# Patient Record
Sex: MALE | Race: WHITE | Employment: FULL TIME | URBAN - METROPOLITAN AREA
[De-identification: names, ages, dates, MRNs, and addresses within clinical notes are randomized per-mention and may not be internally consistent; named-entity substitution may affect disease eponyms.]

---

## 2017-01-05 ENCOUNTER — HOSPITAL ENCOUNTER (OUTPATIENT)
Dept: GENERAL RADIOLOGY | Age: 62
Discharge: HOME | End: 2017-01-05
Payer: COMMERCIAL

## 2017-01-05 DIAGNOSIS — M17.11 PRIMARY OSTEOARTHRITIS OF RIGHT KNEE: ICD-10-CM

## 2017-01-05 PROCEDURE — 73565 X-RAY EXAM OF KNEES: CPT

## 2017-02-02 PROBLEM — M17.11 PRIMARY OSTEOARTHRITIS OF RIGHT KNEE: Status: ACTIVE | Noted: 2017-02-02

## 2017-03-24 RX ORDER — MONTELUKAST SODIUM 10 MG/1
10 TABLET ORAL NIGHTLY
Qty: 90 TABLET | Refills: 3 | Status: SHIPPED | OUTPATIENT
Start: 2017-03-24 | End: 2017-04-18 | Stop reason: SDUPTHER

## 2017-04-18 ENCOUNTER — OFFICE VISIT (OUTPATIENT)
Dept: FAMILY MEDICINE CLINIC | Age: 62
End: 2017-04-18

## 2017-04-18 VITALS
HEIGHT: 72 IN | RESPIRATION RATE: 18 BRPM | SYSTOLIC BLOOD PRESSURE: 134 MMHG | DIASTOLIC BLOOD PRESSURE: 84 MMHG | HEART RATE: 65 BPM | WEIGHT: 263 LBS | BODY MASS INDEX: 35.62 KG/M2 | OXYGEN SATURATION: 96 % | TEMPERATURE: 97.8 F

## 2017-04-18 DIAGNOSIS — Z12.5 PROSTATE CANCER SCREENING: ICD-10-CM

## 2017-04-18 DIAGNOSIS — I10 ESSENTIAL HYPERTENSION: Primary | ICD-10-CM

## 2017-04-18 DIAGNOSIS — I10 ESSENTIAL HYPERTENSION: ICD-10-CM

## 2017-04-18 DIAGNOSIS — M17.11 PRIMARY OSTEOARTHRITIS OF RIGHT KNEE: ICD-10-CM

## 2017-04-18 LAB
ALBUMIN SERPL-MCNC: 4.6 G/DL (ref 3.9–4.9)
ALP BLD-CCNC: 57 U/L (ref 35–104)
ALT SERPL-CCNC: 27 U/L (ref 0–41)
ANION GAP SERPL CALCULATED.3IONS-SCNC: 14 MEQ/L (ref 7–13)
AST SERPL-CCNC: 26 U/L (ref 0–40)
BILIRUB SERPL-MCNC: 0.9 MG/DL (ref 0–1.2)
BUN BLDV-MCNC: 21 MG/DL (ref 8–23)
CALCIUM SERPL-MCNC: 10 MG/DL (ref 8.6–10.2)
CHLORIDE BLD-SCNC: 103 MEQ/L (ref 98–107)
CHOLESTEROL, TOTAL: 187 MG/DL (ref 0–199)
CO2: 24 MEQ/L (ref 22–29)
CREAT SERPL-MCNC: 0.93 MG/DL (ref 0.7–1.2)
GFR AFRICAN AMERICAN: >60
GFR NON-AFRICAN AMERICAN: >60
GLOBULIN: 2.4 G/DL (ref 2.3–3.5)
GLUCOSE BLD-MCNC: 115 MG/DL (ref 74–109)
HDLC SERPL-MCNC: 62 MG/DL (ref 40–59)
LDL CHOLESTEROL CALCULATED: 89 MG/DL (ref 0–129)
POTASSIUM SERPL-SCNC: 4 MEQ/L (ref 3.5–5.1)
PROSTATE SPECIFIC ANTIGEN: 0.65 NG/ML (ref 0–5.4)
SODIUM BLD-SCNC: 141 MEQ/L (ref 132–144)
TOTAL PROTEIN: 7 G/DL (ref 6.4–8.1)
TRIGL SERPL-MCNC: 181 MG/DL (ref 0–200)

## 2017-04-18 PROCEDURE — 99214 OFFICE O/P EST MOD 30 MIN: CPT | Performed by: FAMILY MEDICINE

## 2017-04-18 RX ORDER — AMLODIPINE BESYLATE 5 MG/1
5 TABLET ORAL DAILY
Qty: 90 TABLET | Refills: 3 | Status: SHIPPED | OUTPATIENT
Start: 2017-04-18 | End: 2017-10-17 | Stop reason: SDUPTHER

## 2017-04-18 RX ORDER — ATORVASTATIN CALCIUM 20 MG/1
20 TABLET, FILM COATED ORAL DAILY
Qty: 90 TABLET | Refills: 3 | Status: SHIPPED | OUTPATIENT
Start: 2017-04-18 | End: 2018-04-02 | Stop reason: SDUPTHER

## 2017-04-18 RX ORDER — METOPROLOL TARTRATE 100 MG/1
100 TABLET ORAL 2 TIMES DAILY
Qty: 180 TABLET | Refills: 3 | Status: SHIPPED | OUTPATIENT
Start: 2017-04-18 | End: 2018-04-02 | Stop reason: SDUPTHER

## 2017-04-18 RX ORDER — MONTELUKAST SODIUM 10 MG/1
10 TABLET ORAL NIGHTLY
Qty: 90 TABLET | Refills: 3 | Status: SHIPPED | OUTPATIENT
Start: 2017-04-18 | End: 2018-03-23 | Stop reason: SDUPTHER

## 2017-04-18 RX ORDER — MELOXICAM 15 MG/1
15 TABLET ORAL DAILY
Qty: 90 TABLET | Refills: 3 | Status: SHIPPED | OUTPATIENT
Start: 2017-04-18 | End: 2018-03-04 | Stop reason: SDUPTHER

## 2017-04-18 ASSESSMENT — ENCOUNTER SYMPTOMS
ALLERGIC/IMMUNOLOGIC NEGATIVE: 1
SHORTNESS OF BREATH: 0
EYES NEGATIVE: 1
RESPIRATORY NEGATIVE: 1
GASTROINTESTINAL NEGATIVE: 1

## 2017-05-18 ENCOUNTER — TELEPHONE (OUTPATIENT)
Dept: FAMILY MEDICINE CLINIC | Age: 62
End: 2017-05-18

## 2017-10-17 ENCOUNTER — OFFICE VISIT (OUTPATIENT)
Dept: FAMILY MEDICINE CLINIC | Age: 62
End: 2017-10-17

## 2017-10-17 VITALS
HEART RATE: 74 BPM | HEIGHT: 73 IN | OXYGEN SATURATION: 98 % | SYSTOLIC BLOOD PRESSURE: 160 MMHG | DIASTOLIC BLOOD PRESSURE: 108 MMHG | BODY MASS INDEX: 36.45 KG/M2 | RESPIRATION RATE: 18 BRPM | TEMPERATURE: 98.1 F | WEIGHT: 275 LBS

## 2017-10-17 DIAGNOSIS — I10 ESSENTIAL HYPERTENSION: Primary | ICD-10-CM

## 2017-10-17 DIAGNOSIS — M17.11 PRIMARY OSTEOARTHRITIS OF RIGHT KNEE: ICD-10-CM

## 2017-10-17 PROCEDURE — 99213 OFFICE O/P EST LOW 20 MIN: CPT | Performed by: FAMILY MEDICINE

## 2017-10-17 RX ORDER — AMLODIPINE BESYLATE 10 MG/1
10 TABLET ORAL DAILY
Qty: 90 TABLET | Refills: 3 | Status: SHIPPED | OUTPATIENT
Start: 2017-10-17 | End: 2018-10-02 | Stop reason: SDUPTHER

## 2017-10-17 RX ORDER — FLUTICASONE PROPIONATE 50 MCG
2 SPRAY, SUSPENSION (ML) NASAL DAILY
Qty: 3 BOTTLE | Refills: 3 | Status: SHIPPED | OUTPATIENT
Start: 2017-10-17 | End: 2018-10-02 | Stop reason: SDUPTHER

## 2017-10-17 NOTE — PROGRESS NOTES
Patient is seen in follow up for   Chief Complaint   Patient presents with    Hypertension     6 mon f/u      Hypertension   This is a chronic problem. The current episode started more than 1 year ago. The problem is unchanged. The problem is uncontrolled. Pertinent negatives include no chest pain, palpitations or shortness of breath. There are no associated agents to hypertension. There are no known risk factors for coronary artery disease. Past treatments include calcium channel blockers and beta blockers. The current treatment provides moderate improvement. There are no compliance problems. Past Medical History:   Diagnosis Date    Hypertension      Patient Active Problem List    Diagnosis Date Noted    Primary osteoarthritis of right knee 02/02/2017    Hypertension      No past surgical history on file.   Family History   Problem Relation Age of Onset    High Blood Pressure Mother     Diabetes Mother     Cancer Mother      ovarian    High Blood Pressure Father     Diabetes Father     Cancer Father      lung     Social History     Social History    Marital status: Single     Spouse name: N/A    Number of children: N/A    Years of education: N/A     Social History Main Topics    Smoking status: Never Smoker    Smokeless tobacco: Never Used    Alcohol use None    Drug use: Unknown    Sexual activity: Not Asked     Other Topics Concern    None     Social History Narrative    None     Current Outpatient Prescriptions   Medication Sig Dispense Refill    amLODIPine (NORVASC) 10 MG tablet Take 1 tablet by mouth daily 90 tablet 3    fluticasone (FLONASE) 50 MCG/ACT nasal spray 2 sprays by Nasal route daily 3 Bottle 3    montelukast (SINGULAIR) 10 MG tablet Take 1 tablet by mouth nightly 90 tablet 3    meloxicam (MOBIC) 15 MG tablet Take 1 tablet by mouth daily 90 tablet 3    atorvastatin (LIPITOR) 20 MG tablet Take 1 tablet by mouth daily 90 tablet 3    metoprolol (LOPRESSOR) 100 MG tablet Take 1 tablet by mouth 2 times daily 180 tablet 3    PAZEO 0.7 % SOLN 1 DROP INTO BOTH EYES EVERYDAY  3     No current facility-administered medications for this visit. Current Outpatient Prescriptions on File Prior to Visit   Medication Sig Dispense Refill    montelukast (SINGULAIR) 10 MG tablet Take 1 tablet by mouth nightly 90 tablet 3    meloxicam (MOBIC) 15 MG tablet Take 1 tablet by mouth daily 90 tablet 3    atorvastatin (LIPITOR) 20 MG tablet Take 1 tablet by mouth daily 90 tablet 3    metoprolol (LOPRESSOR) 100 MG tablet Take 1 tablet by mouth 2 times daily 180 tablet 3    PAZEO 0.7 % SOLN 1 DROP INTO BOTH EYES EVERYDAY  3     No current facility-administered medications on file prior to visit. No Known Allergies  Health Maintenance   Topic Date Due    Hepatitis C screen  1955    HIV screen  12/24/1970    DTaP/Tdap/Td vaccine (1 - Tdap) 12/24/1974    Diabetes screen  12/24/1995    Colon cancer screen colonoscopy  12/24/2005    Zostavax vaccine  12/24/2015    Flu vaccine (1) 09/01/2017    Lipid screen  04/18/2022       Review of Systems    Review of Systems   Constitutional: Negative for activity change, appetite change, chills, fever and unexpected weight change. HENT: Negative. Eyes: Negative. Respiratory: Negative. Negative for shortness of breath. Cardiovascular: Negative. Negative for chest pain and palpitations. Gastrointestinal: Negative. Endocrine: Negative. Genitourinary: Negative. Musculoskeletal: Negative. Skin: Negative. Allergic/Immunologic: Negative. Neurological: Negative. Hematological: Negative. Psychiatric/Behavioral: Negative. Physical Exam  Vitals:    10/17/17 1547   BP: (!) 160/108   Pulse: 74   Resp: 18   Temp: 98.1 °F (36.7 °C)   TempSrc: Tympanic   SpO2: 98%   Weight: 275 lb (124.7 kg)   Height: 6' 1\" (1.854 m)       Physical Exam   Constitutional: He appears well-developed and well-nourished.    HENT:   Right Ear: External ear normal.   Left Ear: External ear normal.   Eyes: Conjunctivae are normal. Pupils are equal, round, and reactive to light. Neck: Normal range of motion. Neck supple. No thyromegaly present. Cardiovascular: Normal rate, regular rhythm and normal heart sounds. No murmur heard. Pulmonary/Chest: Effort normal and breath sounds normal.   Abdominal: Soft. Bowel sounds are normal.   Musculoskeletal: Normal range of motion. He exhibits no edema or tenderness. Lymphadenopathy:     He has no cervical adenopathy. Assessment  1. Essential hypertension     2. Primary osteoarthritis of right knee       Problem List     Hypertension - Primary    Relevant Medications    atorvastatin (LIPITOR) 20 MG tablet    metoprolol (LOPRESSOR) 100 MG tablet    amLODIPine (NORVASC) 10 MG tablet    Primary osteoarthritis of right knee    Relevant Medications    betamethasone acetate-betamethasone sodium phosphate (CELESTONE) injection 6 mg (Completed)    betamethasone acetate-betamethasone sodium phosphate (CELESTONE) injection 6 mg (Completed)    meloxicam (MOBIC) 15 MG tablet    sodium hyaluronate (SUPARTZ) injection 25 mg (Completed)    sodium hyaluronate (SUPARTZ) injection 25 mg (Completed)    sodium hyaluronate (SUPARTZ) injection 25 mg (Completed)    sodium hyaluronate (SUPARTZ) injection 25 mg (Completed)    sodium hyaluronate (SUPARTZ) injection 25 mg (Completed)          Plan  No orders of the defined types were placed in this encounter. Orders Placed This Encounter   Medications    amLODIPine (NORVASC) 10 MG tablet     Sig: Take 1 tablet by mouth daily     Dispense:  90 tablet     Refill:  3     Please do not fill until patient requests    fluticasone (FLONASE) 50 MCG/ACT nasal spray     Si sprays by Nasal route daily     Dispense:  3 Bottle     Refill:  3     No Follow-up on file.   Efrain Appiah MD

## 2017-11-02 ASSESSMENT — ENCOUNTER SYMPTOMS
SHORTNESS OF BREATH: 0
EYES NEGATIVE: 1
RESPIRATORY NEGATIVE: 1
ALLERGIC/IMMUNOLOGIC NEGATIVE: 1
GASTROINTESTINAL NEGATIVE: 1

## 2018-02-07 ENCOUNTER — HOSPITAL ENCOUNTER (OUTPATIENT)
Dept: ULTRASOUND IMAGING | Age: 63
Discharge: HOME | End: 2018-02-09
Payer: COMMERCIAL

## 2018-02-07 ENCOUNTER — HOSPITAL ENCOUNTER (OUTPATIENT)
Dept: ORTHOPEDIC SURGERY | Age: 63
Discharge: HOME | End: 2018-02-09
Payer: COMMERCIAL

## 2018-02-07 DIAGNOSIS — M25.571 PAIN IN JOINT INVOLVING RIGHT ANKLE AND FOOT: ICD-10-CM

## 2018-02-07 DIAGNOSIS — M25.571 PAIN AND SWELLING OF ANKLE, RIGHT: ICD-10-CM

## 2018-02-07 DIAGNOSIS — M25.471 PAIN AND SWELLING OF ANKLE, RIGHT: ICD-10-CM

## 2018-02-07 PROCEDURE — 73610 X-RAY EXAM OF ANKLE: CPT

## 2018-02-07 PROCEDURE — 93971 EXTREMITY STUDY: CPT

## 2018-02-21 ENCOUNTER — HOSPITAL ENCOUNTER (OUTPATIENT)
Dept: ORTHOPEDIC SURGERY | Age: 63
Discharge: HOME | End: 2018-02-23
Payer: COMMERCIAL

## 2018-02-21 DIAGNOSIS — S82.891D CLOSED FRACTURE OF RIGHT ANKLE WITH ROUTINE HEALING, SUBSEQUENT ENCOUNTER: ICD-10-CM

## 2018-02-21 PROCEDURE — 73610 X-RAY EXAM OF ANKLE: CPT

## 2018-03-05 RX ORDER — MELOXICAM 15 MG/1
TABLET ORAL
Qty: 90 TABLET | Refills: 3 | Status: SHIPPED | OUTPATIENT
Start: 2018-03-05 | End: 2019-05-07 | Stop reason: SDUPTHER

## 2018-03-05 RX ORDER — MONTELUKAST SODIUM 10 MG/1
TABLET ORAL
Qty: 90 TABLET | Refills: 3 | Status: SHIPPED | OUTPATIENT
Start: 2018-03-05 | End: 2018-03-23 | Stop reason: SDUPTHER

## 2018-03-14 ENCOUNTER — HOSPITAL ENCOUNTER (OUTPATIENT)
Dept: GENERAL RADIOLOGY | Age: 63
Discharge: HOME | End: 2018-03-16
Payer: COMMERCIAL

## 2018-03-14 DIAGNOSIS — T14.8XXA CLOSED FRACTURE: ICD-10-CM

## 2018-03-14 PROCEDURE — 73610 X-RAY EXAM OF ANKLE: CPT

## 2018-03-23 RX ORDER — MONTELUKAST SODIUM 10 MG/1
TABLET ORAL
Qty: 90 TABLET | Refills: 3 | Status: SHIPPED | OUTPATIENT
Start: 2018-03-23 | End: 2020-01-28

## 2018-04-02 ENCOUNTER — OFFICE VISIT (OUTPATIENT)
Dept: INTERNAL MEDICINE CLINIC | Age: 63
End: 2018-04-02
Payer: COMMERCIAL

## 2018-04-02 VITALS
SYSTOLIC BLOOD PRESSURE: 138 MMHG | TEMPERATURE: 98 F | HEART RATE: 71 BPM | RESPIRATION RATE: 16 BRPM | WEIGHT: 262 LBS | DIASTOLIC BLOOD PRESSURE: 80 MMHG | HEIGHT: 74 IN | OXYGEN SATURATION: 98 % | BODY MASS INDEX: 33.62 KG/M2

## 2018-04-02 DIAGNOSIS — Z12.5 PROSTATE CANCER SCREENING: ICD-10-CM

## 2018-04-02 DIAGNOSIS — Z00.00 PHYSICAL EXAM, ANNUAL: Primary | ICD-10-CM

## 2018-04-02 DIAGNOSIS — I10 ESSENTIAL HYPERTENSION: ICD-10-CM

## 2018-04-02 LAB
BASOPHILS ABSOLUTE: 0 K/UL (ref 0–0.2)
BASOPHILS RELATIVE PERCENT: 1 %
EOSINOPHILS ABSOLUTE: 0.1 K/UL (ref 0–0.7)
EOSINOPHILS RELATIVE PERCENT: 2.5 %
HCT VFR BLD CALC: 40.7 % (ref 42–52)
HEMOGLOBIN: 13.7 G/DL (ref 14–18)
LYMPHOCYTES ABSOLUTE: 1.4 K/UL (ref 1–4.8)
LYMPHOCYTES RELATIVE PERCENT: 30.5 %
MCH RBC QN AUTO: 31.1 PG (ref 27–31.3)
MCHC RBC AUTO-ENTMCNC: 33.8 % (ref 33–37)
MCV RBC AUTO: 92.1 FL (ref 80–100)
MONOCYTES ABSOLUTE: 0.5 K/UL (ref 0.2–0.8)
MONOCYTES RELATIVE PERCENT: 9.7 %
NEUTROPHILS ABSOLUTE: 2.6 K/UL (ref 1.4–6.5)
NEUTROPHILS RELATIVE PERCENT: 56.3 %
PDW BLD-RTO: 13.2 % (ref 11.5–14.5)
PLATELET # BLD: 207 K/UL (ref 130–400)
RBC # BLD: 4.42 M/UL (ref 4.7–6.1)
WBC # BLD: 4.7 K/UL (ref 4.8–10.8)

## 2018-04-02 PROCEDURE — 99396 PREV VISIT EST AGE 40-64: CPT | Performed by: FAMILY MEDICINE

## 2018-04-02 PROCEDURE — 36415 COLL VENOUS BLD VENIPUNCTURE: CPT | Performed by: FAMILY MEDICINE

## 2018-04-02 RX ORDER — ATORVASTATIN CALCIUM 20 MG/1
20 TABLET, FILM COATED ORAL DAILY
Qty: 90 TABLET | Refills: 3 | Status: SHIPPED | OUTPATIENT
Start: 2018-04-02 | End: 2019-04-02 | Stop reason: SDUPTHER

## 2018-04-02 RX ORDER — METOPROLOL TARTRATE 100 MG/1
100 TABLET ORAL 2 TIMES DAILY
Qty: 180 TABLET | Refills: 3 | Status: SHIPPED | OUTPATIENT
Start: 2018-04-02 | End: 2019-04-02 | Stop reason: SDUPTHER

## 2018-04-02 ASSESSMENT — PATIENT HEALTH QUESTIONNAIRE - PHQ9
2. FEELING DOWN, DEPRESSED OR HOPELESS: 0
SUM OF ALL RESPONSES TO PHQ9 QUESTIONS 1 & 2: 0
SUM OF ALL RESPONSES TO PHQ QUESTIONS 1-9: 0
1. LITTLE INTEREST OR PLEASURE IN DOING THINGS: 0

## 2018-04-02 ASSESSMENT — ENCOUNTER SYMPTOMS
EYES NEGATIVE: 1
SHORTNESS OF BREATH: 0
RESPIRATORY NEGATIVE: 1
GASTROINTESTINAL NEGATIVE: 1
ALLERGIC/IMMUNOLOGIC NEGATIVE: 1

## 2018-04-03 LAB
ALBUMIN SERPL-MCNC: 4.7 G/DL (ref 3.9–4.9)
ALP BLD-CCNC: 82 U/L (ref 35–104)
ALT SERPL-CCNC: 25 U/L (ref 0–41)
ANION GAP SERPL CALCULATED.3IONS-SCNC: 22 MEQ/L (ref 7–13)
AST SERPL-CCNC: 29 U/L (ref 0–40)
BILIRUB SERPL-MCNC: 1 MG/DL (ref 0–1.2)
BUN BLDV-MCNC: 22 MG/DL (ref 8–23)
CALCIUM SERPL-MCNC: 9.2 MG/DL (ref 8.6–10.2)
CHLORIDE BLD-SCNC: 101 MEQ/L (ref 98–107)
CHOLESTEROL, TOTAL: 174 MG/DL (ref 0–199)
CO2: 20 MEQ/L (ref 22–29)
CREAT SERPL-MCNC: 0.96 MG/DL (ref 0.7–1.2)
GFR AFRICAN AMERICAN: >60
GFR NON-AFRICAN AMERICAN: >60
GLOBULIN: 2.5 G/DL (ref 2.3–3.5)
GLUCOSE BLD-MCNC: 103 MG/DL (ref 74–109)
HDLC SERPL-MCNC: 56 MG/DL (ref 40–59)
LDL CHOLESTEROL CALCULATED: 68 MG/DL (ref 0–129)
POTASSIUM SERPL-SCNC: 4.4 MEQ/L (ref 3.5–5.1)
PROSTATE SPECIFIC ANTIGEN: 0.58 NG/ML (ref 0–5.4)
SODIUM BLD-SCNC: 143 MEQ/L (ref 132–144)
TOTAL PROTEIN: 7.2 G/DL (ref 6.4–8.1)
TRIGL SERPL-MCNC: 251 MG/DL (ref 0–200)

## 2018-04-11 ENCOUNTER — HOSPITAL ENCOUNTER (OUTPATIENT)
Dept: GENERAL RADIOLOGY | Age: 63
Discharge: HOME | End: 2018-04-13
Payer: COMMERCIAL

## 2018-04-11 DIAGNOSIS — S82.891A CLOSED FRACTURE OF RIGHT ANKLE, INITIAL ENCOUNTER: ICD-10-CM

## 2018-04-11 PROCEDURE — 73610 X-RAY EXAM OF ANKLE: CPT

## 2018-10-02 ENCOUNTER — OFFICE VISIT (OUTPATIENT)
Dept: INTERNAL MEDICINE CLINIC | Age: 63
End: 2018-10-02
Payer: COMMERCIAL

## 2018-10-02 VITALS
OXYGEN SATURATION: 97 % | DIASTOLIC BLOOD PRESSURE: 84 MMHG | WEIGHT: 273 LBS | TEMPERATURE: 97.7 F | HEIGHT: 74 IN | SYSTOLIC BLOOD PRESSURE: 136 MMHG | HEART RATE: 92 BPM | BODY MASS INDEX: 35.04 KG/M2 | RESPIRATION RATE: 14 BRPM

## 2018-10-02 DIAGNOSIS — Z12.11 SCREENING FOR COLON CANCER: ICD-10-CM

## 2018-10-02 DIAGNOSIS — I10 ESSENTIAL HYPERTENSION: Primary | ICD-10-CM

## 2018-10-02 LAB — GLUCOSE FASTING: 103 MG/DL

## 2018-10-02 PROCEDURE — 99214 OFFICE O/P EST MOD 30 MIN: CPT | Performed by: FAMILY MEDICINE

## 2018-10-02 RX ORDER — FLUTICASONE PROPIONATE 50 MCG
2 SPRAY, SUSPENSION (ML) NASAL DAILY
Qty: 3 BOTTLE | Refills: 3 | Status: SHIPPED | OUTPATIENT
Start: 2018-10-02 | End: 2019-09-17 | Stop reason: ALTCHOICE

## 2018-10-02 RX ORDER — AMLODIPINE BESYLATE 10 MG/1
10 TABLET ORAL DAILY
Qty: 90 TABLET | Refills: 3 | Status: SHIPPED | OUTPATIENT
Start: 2018-10-02 | End: 2018-10-22 | Stop reason: SDUPTHER

## 2018-10-02 ASSESSMENT — ENCOUNTER SYMPTOMS
ALLERGIC/IMMUNOLOGIC NEGATIVE: 1
EYES NEGATIVE: 1
RESPIRATORY NEGATIVE: 1
SHORTNESS OF BREATH: 0
GASTROINTESTINAL NEGATIVE: 1

## 2018-10-04 ENCOUNTER — NURSE ONLY (OUTPATIENT)
Dept: INTERNAL MEDICINE CLINIC | Age: 63
End: 2018-10-04
Payer: COMMERCIAL

## 2018-10-04 DIAGNOSIS — Z12.11 SCREENING FOR COLON CANCER: ICD-10-CM

## 2018-10-04 LAB
CONTROL: NORMAL
HEMOCCULT STL QL: NEGATIVE

## 2018-10-04 PROCEDURE — 82274 ASSAY TEST FOR BLOOD FECAL: CPT | Performed by: FAMILY MEDICINE

## 2018-10-22 RX ORDER — AMLODIPINE BESYLATE 10 MG/1
10 TABLET ORAL DAILY
Qty: 90 TABLET | Refills: 1 | Status: SHIPPED | OUTPATIENT
Start: 2018-10-22 | End: 2019-09-17 | Stop reason: ALTCHOICE

## 2018-12-03 RX ORDER — MONTELUKAST SODIUM 10 MG/1
TABLET ORAL
Qty: 90 TABLET | Refills: 3 | Status: SHIPPED | OUTPATIENT
Start: 2018-12-03 | End: 2019-09-17 | Stop reason: ALTCHOICE

## 2018-12-03 RX ORDER — AMLODIPINE BESYLATE 10 MG/1
10 TABLET ORAL DAILY
Qty: 90 TABLET | Refills: 3 | Status: SHIPPED | OUTPATIENT
Start: 2018-12-03 | End: 2020-06-17

## 2019-04-02 ENCOUNTER — OFFICE VISIT (OUTPATIENT)
Dept: INTERNAL MEDICINE CLINIC | Age: 64
End: 2019-04-02
Payer: COMMERCIAL

## 2019-04-02 VITALS
WEIGHT: 274 LBS | OXYGEN SATURATION: 98 % | HEIGHT: 72 IN | RESPIRATION RATE: 14 BRPM | SYSTOLIC BLOOD PRESSURE: 130 MMHG | HEART RATE: 83 BPM | TEMPERATURE: 97.6 F | BODY MASS INDEX: 37.11 KG/M2 | DIASTOLIC BLOOD PRESSURE: 82 MMHG

## 2019-04-02 DIAGNOSIS — Z00.00 PHYSICAL EXAM, ANNUAL: Primary | ICD-10-CM

## 2019-04-02 DIAGNOSIS — I10 ESSENTIAL HYPERTENSION: ICD-10-CM

## 2019-04-02 DIAGNOSIS — Z12.5 PROSTATE CANCER SCREENING: ICD-10-CM

## 2019-04-02 LAB
ALBUMIN SERPL-MCNC: 4.6 G/DL (ref 3.5–4.6)
ALP BLD-CCNC: 64 U/L (ref 35–104)
ALT SERPL-CCNC: 20 U/L (ref 0–41)
ANION GAP SERPL CALCULATED.3IONS-SCNC: 17 MEQ/L (ref 9–15)
AST SERPL-CCNC: 20 U/L (ref 0–40)
BASOPHILS ABSOLUTE: 0.1 K/UL (ref 0–0.2)
BASOPHILS RELATIVE PERCENT: 1.1 %
BILIRUB SERPL-MCNC: 1 MG/DL (ref 0.2–0.7)
BUN BLDV-MCNC: 22 MG/DL (ref 8–23)
CALCIUM SERPL-MCNC: 9.6 MG/DL (ref 8.5–9.9)
CHLORIDE BLD-SCNC: 105 MEQ/L (ref 95–107)
CHOLESTEROL, TOTAL: 186 MG/DL (ref 0–199)
CO2: 21 MEQ/L (ref 20–31)
CREAT SERPL-MCNC: 0.84 MG/DL (ref 0.7–1.2)
EOSINOPHILS ABSOLUTE: 0.2 K/UL (ref 0–0.7)
EOSINOPHILS RELATIVE PERCENT: 3.2 %
GFR AFRICAN AMERICAN: >60
GFR NON-AFRICAN AMERICAN: >60
GLOBULIN: 2.8 G/DL (ref 2.3–3.5)
GLUCOSE BLD-MCNC: 113 MG/DL (ref 70–99)
HCT VFR BLD CALC: 41.5 % (ref 42–52)
HDLC SERPL-MCNC: 69 MG/DL (ref 40–59)
HEMOGLOBIN: 13.7 G/DL (ref 14–18)
LDL CHOLESTEROL CALCULATED: 70 MG/DL (ref 0–129)
LYMPHOCYTES ABSOLUTE: 1.4 K/UL (ref 1–4.8)
LYMPHOCYTES RELATIVE PERCENT: 28.4 %
MCH RBC QN AUTO: 29.9 PG (ref 27–31.3)
MCHC RBC AUTO-ENTMCNC: 33.2 % (ref 33–37)
MCV RBC AUTO: 90.1 FL (ref 80–100)
MONOCYTES ABSOLUTE: 0.5 K/UL (ref 0.2–0.8)
MONOCYTES RELATIVE PERCENT: 9.7 %
NEUTROPHILS ABSOLUTE: 2.8 K/UL (ref 1.4–6.5)
NEUTROPHILS RELATIVE PERCENT: 57.6 %
PDW BLD-RTO: 13.5 % (ref 11.5–14.5)
PLATELET # BLD: 178 K/UL (ref 130–400)
POTASSIUM SERPL-SCNC: 4.1 MEQ/L (ref 3.4–4.9)
PROSTATE SPECIFIC ANTIGEN: 0.66 NG/ML (ref 0–5.4)
RBC # BLD: 4.6 M/UL (ref 4.7–6.1)
SODIUM BLD-SCNC: 143 MEQ/L (ref 135–144)
TOTAL PROTEIN: 7.4 G/DL (ref 6.3–8)
TRIGL SERPL-MCNC: 233 MG/DL (ref 0–150)
WBC # BLD: 4.9 K/UL (ref 4.8–10.8)

## 2019-04-02 PROCEDURE — 99396 PREV VISIT EST AGE 40-64: CPT | Performed by: FAMILY MEDICINE

## 2019-04-02 RX ORDER — METOPROLOL TARTRATE 100 MG/1
100 TABLET ORAL 2 TIMES DAILY
Qty: 180 TABLET | Refills: 3 | Status: SHIPPED | OUTPATIENT
Start: 2019-04-02 | End: 2019-04-02 | Stop reason: CLARIF

## 2019-04-02 RX ORDER — ATORVASTATIN CALCIUM 20 MG/1
20 TABLET, FILM COATED ORAL DAILY
Qty: 90 TABLET | Refills: 3 | Status: SHIPPED | OUTPATIENT
Start: 2019-04-02 | End: 2019-04-02

## 2019-04-02 ASSESSMENT — PATIENT HEALTH QUESTIONNAIRE - PHQ9
SUM OF ALL RESPONSES TO PHQ9 QUESTIONS 1 & 2: 0
SUM OF ALL RESPONSES TO PHQ QUESTIONS 1-9: 0
SUM OF ALL RESPONSES TO PHQ QUESTIONS 1-9: 0
1. LITTLE INTEREST OR PLEASURE IN DOING THINGS: 0
2. FEELING DOWN, DEPRESSED OR HOPELESS: 0

## 2019-04-02 ASSESSMENT — ENCOUNTER SYMPTOMS
RESPIRATORY NEGATIVE: 1
ALLERGIC/IMMUNOLOGIC NEGATIVE: 1
GASTROINTESTINAL NEGATIVE: 1
EYES NEGATIVE: 1
SHORTNESS OF BREATH: 0

## 2019-04-02 NOTE — PROGRESS NOTES
Patient is seen in follow up for   Chief Complaint   Patient presents with    Employment Physical     Patient here for Be Well    [facility] Maintenance     declines as of now. HPIhere for annual exam feeling well. Past Medical History:   Diagnosis Date    Hypertension      Patient Active Problem List    Diagnosis Date Noted    Primary osteoarthritis of right knee 02/02/2017    Hypertension      No past surgical history on file.   Family History   Problem Relation Age of Onset    High Blood Pressure Mother     Diabetes Mother     Cancer Mother         ovarian    High Blood Pressure Father     Diabetes Father     Cancer Father         lung     Social History     Socioeconomic History    Marital status: Single     Spouse name: None    Number of children: None    Years of education: None    Highest education level: None   Occupational History    None   Social Needs    Financial resource strain: None    Food insecurity:     Worry: None     Inability: None    Transportation needs:     Medical: None     Non-medical: None   Tobacco Use    Smoking status: Never Smoker    Smokeless tobacco: Never Used   Substance and Sexual Activity    Alcohol use: None    Drug use: None    Sexual activity: None   Lifestyle    Physical activity:     Days per week: None     Minutes per session: None    Stress: None   Relationships    Social connections:     Talks on phone: None     Gets together: None     Attends Scientologist service: None     Active member of club or organization: None     Attends meetings of clubs or organizations: None     Relationship status: None    Intimate partner violence:     Fear of current or ex partner: None     Emotionally abused: None     Physically abused: None     Forced sexual activity: None   Other Topics Concern    None   Social History Narrative    None     Current Outpatient Medications   Medication Sig Dispense Refill    atorvastatin (LIPITOR) 20 MG tablet Take 1 tablet by mouth daily 90 tablet 3    metoprolol (LOPRESSOR) 100 MG tablet Take 1 tablet by mouth 2 times daily 180 tablet 3    montelukast (SINGULAIR) 10 MG tablet TAKE ONE TABLET BY MOUTH NIGHTLY 90 tablet 3    amLODIPine (NORVASC) 10 MG tablet TAKE 1 TABLET BY MOUTH DAILY 90 tablet 3    amLODIPine (NORVASC) 10 MG tablet TAKE 1 TABLET BY MOUTH DAILY 90 tablet 1    fluticasone (FLONASE) 50 MCG/ACT nasal spray 2 sprays by Nasal route daily 3 Bottle 3    montelukast (SINGULAIR) 10 MG tablet TAKE ONE TABLET BY MOUTH NIGHTLY 90 tablet 3    meloxicam (MOBIC) 15 MG tablet TAKE ONE TABLET BY MOUTH ONE TIME A DAY 90 tablet 3    PAZEO 0.7 % SOLN 1 DROP INTO BOTH EYES EVERYDAY  3     No current facility-administered medications for this visit. Current Outpatient Medications on File Prior to Visit   Medication Sig Dispense Refill    montelukast (SINGULAIR) 10 MG tablet TAKE ONE TABLET BY MOUTH NIGHTLY 90 tablet 3    amLODIPine (NORVASC) 10 MG tablet TAKE 1 TABLET BY MOUTH DAILY 90 tablet 3    amLODIPine (NORVASC) 10 MG tablet TAKE 1 TABLET BY MOUTH DAILY 90 tablet 1    fluticasone (FLONASE) 50 MCG/ACT nasal spray 2 sprays by Nasal route daily 3 Bottle 3    montelukast (SINGULAIR) 10 MG tablet TAKE ONE TABLET BY MOUTH NIGHTLY 90 tablet 3    meloxicam (MOBIC) 15 MG tablet TAKE ONE TABLET BY MOUTH ONE TIME A DAY 90 tablet 3    PAZEO 0.7 % SOLN 1 DROP INTO BOTH EYES EVERYDAY  3     No current facility-administered medications on file prior to visit.       No Known Allergies  Health Maintenance   Topic Date Due    Hepatitis C screen  1955    HIV screen  12/24/1970    DTaP/Tdap/Td vaccine (1 - Tdap) 12/24/1974    Shingles Vaccine (1 of 2) 12/24/2005    Colon Cancer Screen FIT/FOBT  10/04/2019    Diabetes screen  04/02/2021    Lipid screen  04/02/2023    Flu vaccine  Completed       Review of Systems     Review of Systems   Constitutional: Negative for activity change, appetite change, chills, fever and unexpected weight change. HENT: Negative. Eyes: Negative. Respiratory: Negative. Negative for shortness of breath. Cardiovascular: Negative. Negative for chest pain and palpitations. Gastrointestinal: Negative. Endocrine: Negative. Genitourinary: Negative. Musculoskeletal: Negative. Skin: Negative. Allergic/Immunologic: Negative. Neurological: Negative. Hematological: Negative. Psychiatric/Behavioral: Negative. Physical Exam  Vitals:    04/02/19 1546   BP: 130/82   Site: Left Upper Arm   Position: Sitting   Cuff Size: Large Adult   Pulse: 83   Resp: 14   Temp: 97.6 °F (36.4 °C)   TempSrc: Oral   SpO2: 98%   Weight: 274 lb (124.3 kg)   Height: 6' (1.829 m)       Physical Exam   Constitutional: He is oriented to person, place, and time. He appears well-developed and well-nourished. HENT:   Right Ear: External ear normal.   Left Ear: External ear normal.   Eyes: Pupils are equal, round, and reactive to light. Conjunctivae and EOM are normal.   Neck: Normal range of motion. Neck supple. No thyromegaly present. Cardiovascular: Normal rate, regular rhythm, normal heart sounds and intact distal pulses. Exam reveals no gallop and no friction rub. No murmur heard. Pulmonary/Chest: Effort normal and breath sounds normal. No respiratory distress. He has no wheezes. Abdominal: Soft. Bowel sounds are normal. He exhibits no distension and no mass. There is no tenderness. There is no rebound and no guarding. No hernia. Genitourinary: Penis normal.   Musculoskeletal: Normal range of motion. He exhibits no edema or tenderness. Lymphadenopathy:     He has no cervical adenopathy. Neurological: He is alert and oriented to person, place, and time. No cranial nerve deficit. Coordination normal.   Skin: Skin is warm and dry. Psychiatric: He has a normal mood and affect. Assessment   Diagnosis Orders   1.  Essential hypertension  Comprehensive Metabolic Panel    CBC Auto Differential    Lipid Panel   2. Physical exam, annual     3. Prostate cancer screening  Psa screening     Problem List     Hypertension - Primary    Relevant Medications    amLODIPine (NORVASC) 10 MG tablet    amLODIPine (NORVASC) 10 MG tablet    atorvastatin (LIPITOR) 20 MG tablet    metoprolol (LOPRESSOR) 100 MG tablet    Other Relevant Orders    Comprehensive Metabolic Panel    CBC Auto Differential    Lipid Panel          Plan  Orders Placed This Encounter   Procedures    Comprehensive Metabolic Panel     Standing Status:   Future     Standing Expiration Date:   4/1/2020    CBC Auto Differential     Standing Status:   Future     Standing Expiration Date:   4/1/2020    Lipid Panel     Standing Status:   Future     Standing Expiration Date:   4/1/2020     Order Specific Question:   Is Patient Fasting?/# of Hours     Answer:   8    Psa screening     Standing Status:   Future     Standing Expiration Date:   4/1/2020     Orders Placed This Encounter   Medications    atorvastatin (LIPITOR) 20 MG tablet     Sig: Take 1 tablet by mouth daily     Dispense:  90 tablet     Refill:  3     Please do not fill until patient requests    metoprolol (LOPRESSOR) 100 MG tablet     Sig: Take 1 tablet by mouth 2 times daily     Dispense:  180 tablet     Refill:  3     No follow-ups on file.   Franklin Stahl MD

## 2019-05-07 RX ORDER — MELOXICAM 15 MG/1
TABLET ORAL
Qty: 90 TABLET | Refills: 3 | Status: SHIPPED | OUTPATIENT
Start: 2019-05-07 | End: 2019-07-11 | Stop reason: SDUPTHER

## 2019-07-11 DIAGNOSIS — M17.11 PRIMARY OSTEOARTHRITIS OF RIGHT KNEE: Primary | ICD-10-CM

## 2019-07-11 RX ORDER — MELOXICAM 15 MG/1
TABLET ORAL
Qty: 90 TABLET | Refills: 3 | Status: SHIPPED | OUTPATIENT
Start: 2019-07-11 | End: 2020-06-17 | Stop reason: SDUPTHER

## 2019-09-17 ENCOUNTER — OFFICE VISIT (OUTPATIENT)
Dept: FAMILY MEDICINE CLINIC | Age: 64
End: 2019-09-17
Payer: COMMERCIAL

## 2019-09-17 VITALS
HEART RATE: 68 BPM | DIASTOLIC BLOOD PRESSURE: 80 MMHG | OXYGEN SATURATION: 98 % | RESPIRATION RATE: 18 BRPM | WEIGHT: 275 LBS | HEIGHT: 72 IN | BODY MASS INDEX: 37.25 KG/M2 | SYSTOLIC BLOOD PRESSURE: 132 MMHG | TEMPERATURE: 98.1 F

## 2019-09-17 DIAGNOSIS — I10 ESSENTIAL HYPERTENSION: Primary | ICD-10-CM

## 2019-09-17 DIAGNOSIS — Z00.00 PHYSICAL EXAM, ANNUAL: ICD-10-CM

## 2019-09-17 DIAGNOSIS — M17.11 PRIMARY OSTEOARTHRITIS OF RIGHT KNEE: ICD-10-CM

## 2019-09-17 PROCEDURE — 99396 PREV VISIT EST AGE 40-64: CPT | Performed by: FAMILY MEDICINE

## 2019-09-17 RX ORDER — METOPROLOL TARTRATE 100 MG/1
100 TABLET ORAL 2 TIMES DAILY
Qty: 180 TABLET | Refills: 3 | Status: SHIPPED | OUTPATIENT
Start: 2019-09-17 | End: 2020-06-17 | Stop reason: SDUPTHER

## 2019-09-17 RX ORDER — ATORVASTATIN CALCIUM 20 MG/1
20 TABLET, FILM COATED ORAL DAILY
Qty: 90 TABLET | Refills: 3 | Status: SHIPPED | OUTPATIENT
Start: 2019-09-17 | End: 2020-06-17 | Stop reason: SDUPTHER

## 2019-09-17 RX ORDER — AMLODIPINE BESYLATE 10 MG/1
10 TABLET ORAL DAILY
Qty: 90 TABLET | Refills: 3 | Status: SHIPPED | OUTPATIENT
Start: 2019-09-17 | End: 2020-06-17 | Stop reason: SDUPTHER

## 2019-09-17 RX ORDER — ATORVASTATIN CALCIUM 20 MG/1
20 TABLET, FILM COATED ORAL DAILY
COMMUNITY
End: 2019-09-17 | Stop reason: SDUPTHER

## 2019-09-17 ASSESSMENT — ENCOUNTER SYMPTOMS
EYES NEGATIVE: 1
GASTROINTESTINAL NEGATIVE: 1
RESPIRATORY NEGATIVE: 1
SHORTNESS OF BREATH: 0
ALLERGIC/IMMUNOLOGIC NEGATIVE: 1

## 2019-09-17 NOTE — PROGRESS NOTES
Patient is seen in follow up for   Chief Complaint   Patient presents with    Employment Physical     Patient here for a follow up from his  Be well exam     Health Maintenance     declines at this time      HPIhere for annual wellness feeling well. Past Medical History:   Diagnosis Date    Hypertension      Patient Active Problem List    Diagnosis Date Noted    Primary osteoarthritis of right knee 02/02/2017    Hypertension      No past surgical history on file.   Family History   Problem Relation Age of Onset    High Blood Pressure Mother     Diabetes Mother     Cancer Mother         ovarian    High Blood Pressure Father     Diabetes Father     Cancer Father         lung     Social History     Socioeconomic History    Marital status: Single     Spouse name: None    Number of children: None    Years of education: None    Highest education level: None   Occupational History    None   Social Needs    Financial resource strain: None    Food insecurity:     Worry: None     Inability: None    Transportation needs:     Medical: None     Non-medical: None   Tobacco Use    Smoking status: Never Smoker    Smokeless tobacco: Never Used   Substance and Sexual Activity    Alcohol use: None    Drug use: None    Sexual activity: None   Lifestyle    Physical activity:     Days per week: None     Minutes per session: None    Stress: None   Relationships    Social connections:     Talks on phone: None     Gets together: None     Attends Sikh service: None     Active member of club or organization: None     Attends meetings of clubs or organizations: None     Relationship status: None    Intimate partner violence:     Fear of current or ex partner: None     Emotionally abused: None     Physically abused: None     Forced sexual activity: None   Other Topics Concern    None   Social History Narrative    None     Current Outpatient Medications   Medication Sig Dispense Refill    amLODIPine

## 2020-01-28 RX ORDER — MONTELUKAST SODIUM 10 MG/1
TABLET ORAL
Qty: 90 TABLET | Refills: 3 | Status: SHIPPED | OUTPATIENT
Start: 2020-01-28 | End: 2020-06-17 | Stop reason: SDUPTHER

## 2020-06-17 ENCOUNTER — OFFICE VISIT (OUTPATIENT)
Dept: FAMILY MEDICINE CLINIC | Age: 65
End: 2020-06-17
Payer: COMMERCIAL

## 2020-06-17 VITALS
RESPIRATION RATE: 18 BRPM | BODY MASS INDEX: 38.47 KG/M2 | HEIGHT: 72 IN | WEIGHT: 284 LBS | SYSTOLIC BLOOD PRESSURE: 142 MMHG | TEMPERATURE: 97.6 F | DIASTOLIC BLOOD PRESSURE: 86 MMHG | OXYGEN SATURATION: 98 % | HEART RATE: 80 BPM

## 2020-06-17 DIAGNOSIS — Z00.00 PHYSICAL EXAM, ANNUAL: ICD-10-CM

## 2020-06-17 DIAGNOSIS — I10 ESSENTIAL HYPERTENSION: ICD-10-CM

## 2020-06-17 DIAGNOSIS — Z12.5 SCREENING FOR PROSTATE CANCER: ICD-10-CM

## 2020-06-17 LAB
ALBUMIN SERPL-MCNC: 4.7 G/DL (ref 3.5–4.6)
ALP BLD-CCNC: 55 U/L (ref 35–104)
ALT SERPL-CCNC: 23 U/L (ref 0–41)
ANION GAP SERPL CALCULATED.3IONS-SCNC: 13 MEQ/L (ref 9–15)
AST SERPL-CCNC: 20 U/L (ref 0–40)
BASOPHILS ABSOLUTE: 0 K/UL (ref 0–0.2)
BASOPHILS RELATIVE PERCENT: 1.2 %
BILIRUB SERPL-MCNC: 1.1 MG/DL (ref 0.2–0.7)
BUN BLDV-MCNC: 16 MG/DL (ref 8–23)
CALCIUM SERPL-MCNC: 9.5 MG/DL (ref 8.5–9.9)
CHLORIDE BLD-SCNC: 107 MEQ/L (ref 95–107)
CHOLESTEROL, TOTAL: 175 MG/DL (ref 0–199)
CO2: 24 MEQ/L (ref 20–31)
CREAT SERPL-MCNC: 0.85 MG/DL (ref 0.7–1.2)
EOSINOPHILS ABSOLUTE: 0.1 K/UL (ref 0–0.7)
EOSINOPHILS RELATIVE PERCENT: 2.6 %
GFR AFRICAN AMERICAN: >60
GFR NON-AFRICAN AMERICAN: >60
GLOBULIN: 2.2 G/DL (ref 2.3–3.5)
GLUCOSE BLD-MCNC: 104 MG/DL (ref 70–99)
HCT VFR BLD CALC: 41.6 % (ref 42–52)
HDLC SERPL-MCNC: 64 MG/DL (ref 40–59)
HEMOGLOBIN: 13.7 G/DL (ref 14–18)
LDL CHOLESTEROL CALCULATED: 77 MG/DL (ref 0–129)
LYMPHOCYTES ABSOLUTE: 1.3 K/UL (ref 1–4.8)
LYMPHOCYTES RELATIVE PERCENT: 33.3 %
MCH RBC QN AUTO: 30.2 PG (ref 27–31.3)
MCHC RBC AUTO-ENTMCNC: 33 % (ref 33–37)
MCV RBC AUTO: 91.6 FL (ref 80–100)
MONOCYTES ABSOLUTE: 0.4 K/UL (ref 0.2–0.8)
MONOCYTES RELATIVE PERCENT: 10.8 %
NEUTROPHILS ABSOLUTE: 2.1 K/UL (ref 1.4–6.5)
NEUTROPHILS RELATIVE PERCENT: 52.1 %
PDW BLD-RTO: 13.4 % (ref 11.5–14.5)
PLATELET # BLD: 208 K/UL (ref 130–400)
POTASSIUM SERPL-SCNC: 3.9 MEQ/L (ref 3.4–4.9)
PROSTATE SPECIFIC ANTIGEN: 0.61 NG/ML (ref 0–5.4)
RBC # BLD: 4.54 M/UL (ref 4.7–6.1)
SODIUM BLD-SCNC: 144 MEQ/L (ref 135–144)
TOTAL PROTEIN: 6.9 G/DL (ref 6.3–8)
TRIGL SERPL-MCNC: 170 MG/DL (ref 0–150)
WBC # BLD: 4 K/UL (ref 4.8–10.8)

## 2020-06-17 PROCEDURE — 99213 OFFICE O/P EST LOW 20 MIN: CPT | Performed by: NURSE PRACTITIONER

## 2020-06-17 RX ORDER — MELOXICAM 15 MG/1
TABLET ORAL
Qty: 90 TABLET | Refills: 3 | Status: SHIPPED | OUTPATIENT
Start: 2020-06-17 | End: 2020-11-03

## 2020-06-17 RX ORDER — ATORVASTATIN CALCIUM 20 MG/1
20 TABLET, FILM COATED ORAL DAILY
Qty: 90 TABLET | Refills: 3 | Status: SHIPPED | OUTPATIENT
Start: 2020-06-17 | End: 2020-12-15 | Stop reason: SDUPTHER

## 2020-06-17 RX ORDER — METOPROLOL TARTRATE 100 MG/1
100 TABLET ORAL 2 TIMES DAILY
Qty: 180 TABLET | Refills: 3 | Status: SHIPPED | OUTPATIENT
Start: 2020-06-17 | End: 2020-11-03

## 2020-06-17 RX ORDER — AMLODIPINE BESYLATE 10 MG/1
10 TABLET ORAL DAILY
Qty: 90 TABLET | Refills: 3 | Status: SHIPPED | OUTPATIENT
Start: 2020-06-17 | End: 2020-11-03

## 2020-06-17 RX ORDER — MONTELUKAST SODIUM 10 MG/1
TABLET ORAL
Qty: 90 TABLET | Refills: 3 | Status: SHIPPED | OUTPATIENT
Start: 2020-06-17 | End: 2020-12-15 | Stop reason: SDUPTHER

## 2020-06-17 ASSESSMENT — ENCOUNTER SYMPTOMS
GASTROINTESTINAL NEGATIVE: 1
ALLERGIC/IMMUNOLOGIC NEGATIVE: 1
BLURRED VISION: 0
RESPIRATORY NEGATIVE: 1
SHORTNESS OF BREATH: 0
ORTHOPNEA: 0
EYES NEGATIVE: 1

## 2020-06-17 ASSESSMENT — PATIENT HEALTH QUESTIONNAIRE - PHQ9
2. FEELING DOWN, DEPRESSED OR HOPELESS: 0
SUM OF ALL RESPONSES TO PHQ9 QUESTIONS 1 & 2: 0
SUM OF ALL RESPONSES TO PHQ QUESTIONS 1-9: 0
SUM OF ALL RESPONSES TO PHQ QUESTIONS 1-9: 0
1. LITTLE INTEREST OR PLEASURE IN DOING THINGS: 0

## 2020-06-17 NOTE — PROGRESS NOTES
 Stress: Not on file   Relationships    Social connections     Talks on phone: Not on file     Gets together: Not on file     Attends Islam service: Not on file     Active member of club or organization: Not on file     Attends meetings of clubs or organizations: Not on file     Relationship status: Not on file    Intimate partner violence     Fear of current or ex partner: Not on file     Emotionally abused: Not on file     Physically abused: Not on file     Forced sexual activity: Not on file   Other Topics Concern    Not on file   Social History Narrative    Not on file     Allergies:  Patient has no known allergies. Review of Systems   Constitutional: Negative for activity change, appetite change, chills, fever, malaise/fatigue and unexpected weight change. HENT: Negative. Eyes: Negative. Negative for blurred vision. Respiratory: Negative. Negative for shortness of breath. Cardiovascular: Negative. Negative for chest pain, palpitations, orthopnea and PND. Gastrointestinal: Negative. Endocrine: Negative. Genitourinary: Negative. Musculoskeletal: Negative. Negative for neck pain. Skin: Negative. Allergic/Immunologic: Negative. Neurological: Negative. Negative for headaches. Hematological: Negative. Psychiatric/Behavioral: Negative. Objective:    BP (!) 142/86 (Site: Left Upper Arm, Position: Sitting, Cuff Size: Medium Adult)   Pulse 80   Temp 97.6 °F (36.4 °C) (Temporal)   Resp 18   Ht 6' (1.829 m)   Wt 284 lb (128.8 kg)   SpO2 98%   BMI 38.52 kg/m²     Physical Exam  Constitutional:       General: He is not in acute distress. Appearance: Normal appearance. He is well-developed. He is not toxic-appearing. HENT:      Head: Normocephalic and atraumatic. Right Ear: External ear normal.      Left Ear: External ear normal.   Eyes:      Extraocular Movements: Extraocular movements intact.       Conjunctiva/sclera: Conjunctivae normal.

## 2020-06-22 ENCOUNTER — TELEPHONE (OUTPATIENT)
Dept: FAMILY MEDICINE CLINIC | Age: 65
End: 2020-06-22

## 2020-07-24 ENCOUNTER — TELEPHONE (OUTPATIENT)
Dept: FAMILY MEDICINE CLINIC | Age: 65
End: 2020-07-24

## 2020-07-24 NOTE — TELEPHONE ENCOUNTER
Scheduling outreach call to review Health Maintenance and / or schedule appointment. AWV n/a  Colonoscopy due *FIT test done 10-4-18  [facility]   Lab work Hep C-HIV  Mammogram n/a  PHQ-9 done  Last appointment 6-7-20  Upcoming appointment 12-22-20  Scanned and updated HM yes    Appointment note edited

## 2020-11-03 RX ORDER — METOPROLOL TARTRATE 100 MG/1
TABLET ORAL
Qty: 180 TABLET | Refills: 3 | Status: SHIPPED | OUTPATIENT
Start: 2020-11-03 | End: 2020-12-15 | Stop reason: SDUPTHER

## 2020-11-03 RX ORDER — MELOXICAM 15 MG/1
TABLET ORAL
Qty: 90 TABLET | Refills: 3 | Status: SHIPPED | OUTPATIENT
Start: 2020-11-03 | End: 2020-12-15 | Stop reason: SDUPTHER

## 2020-11-03 RX ORDER — AMLODIPINE BESYLATE 10 MG/1
TABLET ORAL
Qty: 90 TABLET | Refills: 3 | Status: SHIPPED | OUTPATIENT
Start: 2020-11-03 | End: 2020-12-15 | Stop reason: SDUPTHER

## 2020-11-03 NOTE — TELEPHONE ENCOUNTER
Recent Visits     06/17/2020  Essential hypertension    [facility] AT [facility] and [address] [place], [place] - CNP

## 2020-11-03 NOTE — TELEPHONE ENCOUNTER
Future Appointments     12/22/2020  MD LEEANNA Cruz AT [facility], [facility] EMERGENCY [facility] AT [facility]    Recent Visits     06/17/2020  Essential hypertension    [facility] AT [facility] and [address] [place] [address] [place] - CNP

## 2020-12-15 RX ORDER — AMLODIPINE BESYLATE 10 MG/1
10 TABLET ORAL DAILY
Qty: 90 TABLET | Refills: 3 | Status: SHIPPED | OUTPATIENT
Start: 2020-12-15 | End: 2020-12-24 | Stop reason: SDUPTHER

## 2020-12-15 RX ORDER — MELOXICAM 15 MG/1
TABLET ORAL
Qty: 90 TABLET | Refills: 3 | Status: SHIPPED | OUTPATIENT
Start: 2020-12-15 | End: 2020-12-24 | Stop reason: SDUPTHER

## 2020-12-15 RX ORDER — ATORVASTATIN CALCIUM 20 MG/1
20 TABLET, FILM COATED ORAL DAILY
Qty: 90 TABLET | Refills: 3 | Status: SHIPPED | OUTPATIENT
Start: 2020-12-15 | End: 2020-12-24 | Stop reason: SDUPTHER

## 2020-12-15 RX ORDER — MONTELUKAST SODIUM 10 MG/1
TABLET ORAL
Qty: 90 TABLET | Refills: 3 | Status: SHIPPED | OUTPATIENT
Start: 2020-12-15 | End: 2020-12-28 | Stop reason: SDUPTHER

## 2020-12-15 RX ORDER — METOPROLOL TARTRATE 100 MG/1
100 TABLET ORAL 2 TIMES DAILY
Qty: 180 TABLET | Refills: 3 | Status: SHIPPED | OUTPATIENT
Start: 2020-12-15 | End: 2020-12-24 | Stop reason: SDUPTHER

## 2020-12-22 ENCOUNTER — OFFICE VISIT (OUTPATIENT)
Dept: FAMILY MEDICINE CLINIC | Age: 65
End: 2020-12-22
Payer: COMMERCIAL

## 2020-12-22 VITALS
BODY MASS INDEX: 38.33 KG/M2 | WEIGHT: 283 LBS | HEART RATE: 98 BPM | SYSTOLIC BLOOD PRESSURE: 136 MMHG | DIASTOLIC BLOOD PRESSURE: 78 MMHG | OXYGEN SATURATION: 97 % | HEIGHT: 72 IN | TEMPERATURE: 98.5 F

## 2020-12-22 PROCEDURE — G8484 FLU IMMUNIZE NO ADMIN: HCPCS | Performed by: FAMILY MEDICINE

## 2020-12-22 PROCEDURE — 99213 OFFICE O/P EST LOW 20 MIN: CPT | Performed by: FAMILY MEDICINE

## 2020-12-22 PROCEDURE — G8417 CALC BMI ABV UP PARAM F/U: HCPCS | Performed by: FAMILY MEDICINE

## 2020-12-22 PROCEDURE — G8427 DOCREV CUR MEDS BY ELIG CLIN: HCPCS | Performed by: FAMILY MEDICINE

## 2020-12-22 PROCEDURE — 3017F COLORECTAL CA SCREEN DOC REV: CPT | Performed by: FAMILY MEDICINE

## 2020-12-22 PROCEDURE — 1036F TOBACCO NON-USER: CPT | Performed by: FAMILY MEDICINE

## 2020-12-22 SDOH — ECONOMIC STABILITY: FOOD INSECURITY: WITHIN THE PAST 12 MONTHS, THE FOOD YOU BOUGHT JUST DIDN'T LAST AND YOU DIDN'T HAVE MONEY TO GET MORE.: NEVER TRUE

## 2020-12-22 SDOH — ECONOMIC STABILITY: TRANSPORTATION INSECURITY
IN THE PAST 12 MONTHS, HAS LACK OF TRANSPORTATION KEPT YOU FROM MEETINGS, WORK, OR FROM GETTING THINGS NEEDED FOR DAILY LIVING?: NO

## 2020-12-22 SDOH — ECONOMIC STABILITY: TRANSPORTATION INSECURITY
IN THE PAST 12 MONTHS, HAS THE LACK OF TRANSPORTATION KEPT YOU FROM MEDICAL APPOINTMENTS OR FROM GETTING MEDICATIONS?: NO

## 2020-12-22 SDOH — ECONOMIC STABILITY: INCOME INSECURITY: HOW HARD IS IT FOR YOU TO PAY FOR THE VERY BASICS LIKE FOOD, HOUSING, MEDICAL CARE, AND HEATING?: NOT HARD AT ALL

## 2020-12-22 SDOH — ECONOMIC STABILITY: FOOD INSECURITY: WITHIN THE PAST 12 MONTHS, YOU WORRIED THAT YOUR FOOD WOULD RUN OUT BEFORE YOU GOT MONEY TO BUY MORE.: NEVER TRUE

## 2020-12-22 ASSESSMENT — ENCOUNTER SYMPTOMS
SHORTNESS OF BREATH: 0
RESPIRATORY NEGATIVE: 1
GASTROINTESTINAL NEGATIVE: 1
EYES NEGATIVE: 1
ALLERGIC/IMMUNOLOGIC NEGATIVE: 1

## 2020-12-22 NOTE — PROGRESS NOTES
Patient is seen in follow up for   Chief Complaint   Patient presents with    Hypertension     BP is lower at home when he checks it. Hypertension  This is a chronic problem. The current episode started more than 1 year ago. The problem is unchanged. The problem is controlled. Pertinent negatives include no chest pain, palpitations or shortness of breath. There are no associated agents to hypertension. Risk factors for coronary artery disease include male gender. Past treatments include beta blockers and calcium channel blockers. The current treatment provides moderate improvement. There are no compliance problems. Past Medical History:   Diagnosis Date    Hypertension      Patient Active Problem List    Diagnosis Date Noted    Primary osteoarthritis of right knee 02/02/2017    Hypertension      No past surgical history on file.   Family History   Problem Relation Age of Onset    High Blood Pressure Mother     Diabetes Mother     Cancer Mother         ovarian    High Blood Pressure Father     Diabetes Father     Cancer Father         lung     Social History     Socioeconomic History    Marital status: Single     Spouse name: None    Number of children: None    Years of education: None    Highest education level: None   Occupational History    None   Social Needs    Financial resource strain: Not hard at all   Shiva-Elian insecurity     Worry: Never true     Inability: Never true    Transportation needs     Medical: No     Non-medical: No   Tobacco Use    Smoking status: Never Smoker    Smokeless tobacco: Never Used   Substance and Sexual Activity    Alcohol use: None    Drug use: None    Sexual activity: None   Lifestyle    Physical activity     Days per week: None     Minutes per session: None    Stress: None   Relationships    Social connections     Talks on phone: None     Gets together: None     Attends Yazdanism service: None     Active member of club or organization: None Attends meetings of clubs or organizations: None     Relationship status: None    Intimate partner violence     Fear of current or ex partner: None     Emotionally abused: None     Physically abused: None     Forced sexual activity: None   Other Topics Concern    None   Social History Narrative    None     Current Outpatient Medications   Medication Sig Dispense Refill    atorvastatin (LIPITOR) 20 MG tablet Take 1 tablet by mouth daily 90 tablet 3    montelukast (SINGULAIR) 10 MG tablet TAKE ONE TABLET BY MOUTH NIGHTLY 90 tablet 3    meloxicam (MOBIC) 15 MG tablet TAKE 1 TABLET BY MOUTH ONE TIME A DAY 90 tablet 3    metoprolol (LOPRESSOR) 100 MG tablet Take 1 tablet by mouth 2 times daily 180 tablet 3    amLODIPine (NORVASC) 10 MG tablet Take 1 tablet by mouth daily 90 tablet 3     No current facility-administered medications for this visit. Current Outpatient Medications on File Prior to Visit   Medication Sig Dispense Refill    atorvastatin (LIPITOR) 20 MG tablet Take 1 tablet by mouth daily 90 tablet 3    montelukast (SINGULAIR) 10 MG tablet TAKE ONE TABLET BY MOUTH NIGHTLY 90 tablet 3    meloxicam (MOBIC) 15 MG tablet TAKE 1 TABLET BY MOUTH ONE TIME A DAY 90 tablet 3    metoprolol (LOPRESSOR) 100 MG tablet Take 1 tablet by mouth 2 times daily 180 tablet 3    amLODIPine (NORVASC) 10 MG tablet Take 1 tablet by mouth daily 90 tablet 3     No current facility-administered medications on file prior to visit.       No Known Allergies  Health Maintenance   Topic Date Due    Hepatitis C screen  1955    HIV screen  12/24/1970    DTaP/Tdap/Td vaccine (1 - Tdap) 12/24/1974    Shingles Vaccine (1 of 2) 12/24/2005    Colon Cancer Screen FIT/FOBT  10/04/2019    Diabetes screen  04/02/2021    Lipid screen  06/17/2021    Flu vaccine  Completed    Hepatitis A vaccine  Aged Out    Hepatitis B vaccine  Aged Out    Hib vaccine  Aged Out    Meningococcal (ACWY) vaccine  Aged Out  Pneumococcal 0-64 years Vaccine  Aged Out       Review of Systems     Review of Systems   Constitutional: Negative for activity change, appetite change, chills, fever and unexpected weight change. HENT: Negative. Eyes: Negative. Respiratory: Negative. Negative for shortness of breath. Cardiovascular: Negative. Negative for chest pain and palpitations. Gastrointestinal: Negative. Endocrine: Negative. Genitourinary: Negative. Musculoskeletal: Negative. Skin: Negative. Allergic/Immunologic: Negative. Neurological: Negative. Hematological: Negative. Psychiatric/Behavioral: Negative. Physical Exam  Vitals:    12/22/20 1532 12/22/20 1545   BP: (!) 168/84 136/78   Site: Left Upper Arm Left Upper Arm   Position: Sitting Sitting   Cuff Size: Large Adult Large Adult   Pulse: 98    Temp: 98.5 °F (36.9 °C)    TempSrc: Oral    SpO2: 97%    Weight: 283 lb (128.4 kg)    Height: 6' (1.829 m)        Physical Exam  Constitutional:       Appearance: He is well-developed. HENT:      Right Ear: External ear normal.      Left Ear: External ear normal.   Eyes:      Conjunctiva/sclera: Conjunctivae normal.      Pupils: Pupils are equal, round, and reactive to light. Neck:      Musculoskeletal: Normal range of motion and neck supple. Thyroid: No thyromegaly. Cardiovascular:      Rate and Rhythm: Normal rate and regular rhythm. Heart sounds: Normal heart sounds. No murmur. No friction rub. No gallop. Pulmonary:      Effort: Pulmonary effort is normal. No respiratory distress. Breath sounds: Normal breath sounds. No wheezing. Abdominal:      General: Bowel sounds are normal. There is no distension. Palpations: Abdomen is soft. There is no mass. Tenderness: There is no abdominal tenderness. There is no guarding or rebound. Hernia: No hernia is present. Genitourinary:     Penis: Normal.    Musculoskeletal: Normal range of motion. General: No tenderness. Lymphadenopathy:      Cervical: No cervical adenopathy. Skin:     General: Skin is warm and dry. Neurological:      Mental Status: He is alert and oriented to person, place, and time. Cranial Nerves: No cranial nerve deficit. Coordination: Coordination normal.         Assessment   Diagnosis Orders   1. Essential hypertension     2. Primary osteoarthritis of right knee     3. Hyperlipidemia, unspecified hyperlipidemia type       Problem List     Hypertension    Relevant Medications    metoprolol (LOPRESSOR) 100 MG tablet    amLODIPine (NORVASC) 10 MG tablet    Primary osteoarthritis of right knee    Relevant Medications    meloxicam (MOBIC) 15 MG tablet          Plan  Doing well follow up in 6 months. No orders of the defined types were placed in this encounter. No follow-ups on file.   Ernestina Fortune MD

## 2020-12-24 RX ORDER — ATORVASTATIN CALCIUM 20 MG/1
20 TABLET, FILM COATED ORAL DAILY
Qty: 90 TABLET | Refills: 3 | Status: SHIPPED | OUTPATIENT
Start: 2020-12-24 | End: 2021-10-04

## 2020-12-24 RX ORDER — METOPROLOL TARTRATE 100 MG/1
100 TABLET ORAL 2 TIMES DAILY
Qty: 180 TABLET | Refills: 3 | Status: SHIPPED | OUTPATIENT
Start: 2020-12-24 | End: 2021-08-26

## 2020-12-24 RX ORDER — MELOXICAM 15 MG/1
TABLET ORAL
Qty: 90 TABLET | Refills: 3 | Status: SHIPPED | OUTPATIENT
Start: 2020-12-24 | End: 2021-10-04

## 2020-12-24 RX ORDER — AMLODIPINE BESYLATE 10 MG/1
10 TABLET ORAL DAILY
Qty: 90 TABLET | Refills: 3 | Status: SHIPPED | OUTPATIENT
Start: 2020-12-24 | End: 2021-10-04

## 2020-12-28 RX ORDER — MONTELUKAST SODIUM 10 MG/1
TABLET ORAL
Qty: 90 TABLET | Refills: 3 | Status: SHIPPED | OUTPATIENT
Start: 2020-12-28 | End: 2022-06-22

## 2021-06-22 ENCOUNTER — OFFICE VISIT (OUTPATIENT)
Dept: FAMILY MEDICINE CLINIC | Age: 66
End: 2021-06-22
Payer: MEDICARE

## 2021-06-22 VITALS
TEMPERATURE: 97.4 F | DIASTOLIC BLOOD PRESSURE: 90 MMHG | HEART RATE: 69 BPM | OXYGEN SATURATION: 98 % | WEIGHT: 272 LBS | SYSTOLIC BLOOD PRESSURE: 144 MMHG | RESPIRATION RATE: 18 BRPM | HEIGHT: 72 IN | BODY MASS INDEX: 36.84 KG/M2

## 2021-06-22 DIAGNOSIS — I10 ESSENTIAL HYPERTENSION: Primary | ICD-10-CM

## 2021-06-22 DIAGNOSIS — I10 ESSENTIAL HYPERTENSION: ICD-10-CM

## 2021-06-22 DIAGNOSIS — Z12.5 SCREENING FOR PROSTATE CANCER: ICD-10-CM

## 2021-06-22 LAB
ALBUMIN SERPL-MCNC: 4.7 G/DL (ref 3.5–4.6)
ALP BLD-CCNC: 69 U/L (ref 35–104)
ALT SERPL-CCNC: 38 U/L (ref 0–41)
ANION GAP SERPL CALCULATED.3IONS-SCNC: 15 MEQ/L (ref 9–15)
AST SERPL-CCNC: 38 U/L (ref 0–40)
BASOPHILS ABSOLUTE: 0 K/UL (ref 0–0.2)
BASOPHILS RELATIVE PERCENT: 0.9 %
BILIRUB SERPL-MCNC: 1.5 MG/DL (ref 0.2–0.7)
BUN BLDV-MCNC: 17 MG/DL (ref 8–23)
CALCIUM SERPL-MCNC: 10.1 MG/DL (ref 8.5–9.9)
CHLORIDE BLD-SCNC: 100 MEQ/L (ref 95–107)
CHOLESTEROL, TOTAL: 176 MG/DL (ref 0–199)
CO2: 23 MEQ/L (ref 20–31)
CREAT SERPL-MCNC: 1 MG/DL (ref 0.7–1.2)
EOSINOPHILS ABSOLUTE: 0.1 K/UL (ref 0–0.7)
EOSINOPHILS RELATIVE PERCENT: 2.4 %
GFR AFRICAN AMERICAN: >60
GFR NON-AFRICAN AMERICAN: >60
GLOBULIN: 2.8 G/DL (ref 2.3–3.5)
GLUCOSE BLD-MCNC: 123 MG/DL (ref 70–99)
HCT VFR BLD CALC: 45.2 % (ref 42–52)
HDLC SERPL-MCNC: 66 MG/DL (ref 40–59)
HEMOGLOBIN: 15.3 G/DL (ref 14–18)
LDL CHOLESTEROL CALCULATED: 78 MG/DL (ref 0–129)
LYMPHOCYTES ABSOLUTE: 1.6 K/UL (ref 1–4.8)
LYMPHOCYTES RELATIVE PERCENT: 29.2 %
MCH RBC QN AUTO: 30.7 PG (ref 27–31.3)
MCHC RBC AUTO-ENTMCNC: 33.9 % (ref 33–37)
MCV RBC AUTO: 90.5 FL (ref 80–100)
MONOCYTES ABSOLUTE: 0.6 K/UL (ref 0.2–0.8)
MONOCYTES RELATIVE PERCENT: 10.9 %
NEUTROPHILS ABSOLUTE: 3 K/UL (ref 1.4–6.5)
NEUTROPHILS RELATIVE PERCENT: 56.6 %
PDW BLD-RTO: 13.7 % (ref 11.5–14.5)
PLATELET # BLD: 253 K/UL (ref 130–400)
POTASSIUM SERPL-SCNC: 4.3 MEQ/L (ref 3.4–4.9)
PROSTATE SPECIFIC ANTIGEN: 0.96 NG/ML (ref 0–5.4)
RBC # BLD: 4.99 M/UL (ref 4.7–6.1)
SODIUM BLD-SCNC: 138 MEQ/L (ref 135–144)
TOTAL PROTEIN: 7.5 G/DL (ref 6.3–8)
TRIGL SERPL-MCNC: 159 MG/DL (ref 0–150)
WBC # BLD: 5.4 K/UL (ref 4.8–10.8)

## 2021-06-22 PROCEDURE — 1123F ACP DISCUSS/DSCN MKR DOCD: CPT | Performed by: FAMILY MEDICINE

## 2021-06-22 PROCEDURE — 3017F COLORECTAL CA SCREEN DOC REV: CPT | Performed by: FAMILY MEDICINE

## 2021-06-22 PROCEDURE — 4040F PNEUMOC VAC/ADMIN/RCVD: CPT | Performed by: FAMILY MEDICINE

## 2021-06-22 PROCEDURE — 99213 OFFICE O/P EST LOW 20 MIN: CPT | Performed by: FAMILY MEDICINE

## 2021-06-22 PROCEDURE — G8427 DOCREV CUR MEDS BY ELIG CLIN: HCPCS | Performed by: FAMILY MEDICINE

## 2021-06-22 PROCEDURE — G8417 CALC BMI ABV UP PARAM F/U: HCPCS | Performed by: FAMILY MEDICINE

## 2021-06-22 PROCEDURE — 1036F TOBACCO NON-USER: CPT | Performed by: FAMILY MEDICINE

## 2021-06-22 ASSESSMENT — PATIENT HEALTH QUESTIONNAIRE - PHQ9
1. LITTLE INTEREST OR PLEASURE IN DOING THINGS: 0
2. FEELING DOWN, DEPRESSED OR HOPELESS: 0
SUM OF ALL RESPONSES TO PHQ QUESTIONS 1-9: 0
SUM OF ALL RESPONSES TO PHQ9 QUESTIONS 1 & 2: 0
SUM OF ALL RESPONSES TO PHQ QUESTIONS 1-9: 0
SUM OF ALL RESPONSES TO PHQ QUESTIONS 1-9: 0

## 2021-06-22 ASSESSMENT — ENCOUNTER SYMPTOMS
EYES NEGATIVE: 1
RESPIRATORY NEGATIVE: 1
GASTROINTESTINAL NEGATIVE: 1
ALLERGIC/IMMUNOLOGIC NEGATIVE: 1
SHORTNESS OF BREATH: 0

## 2021-06-22 NOTE — PROGRESS NOTES
Patient is seen in follow up for   Chief Complaint   Patient presents with    Hypertension     6 month f/u on HTN. Hypertension  This is a chronic problem. The current episode started more than 1 year ago. The problem is unchanged. The problem is controlled. Pertinent negatives include no chest pain, palpitations or shortness of breath. There are no associated agents to hypertension. Risk factors for coronary artery disease include male gender. Past treatments include beta blockers and calcium channel blockers. The current treatment provides moderate improvement. There are no compliance problems. Past Medical History:   Diagnosis Date    Hypertension      Patient Active Problem List    Diagnosis Date Noted    Primary osteoarthritis of right knee 02/02/2017    Hypertension      No past surgical history on file. Family History   Problem Relation Age of Onset    High Blood Pressure Mother     Diabetes Mother     Cancer Mother         ovarian    High Blood Pressure Father     Diabetes Father     Cancer Father         lung     Social History     Socioeconomic History    Marital status: Single     Spouse name: None    Number of children: None    Years of education: None    Highest education level: None   Occupational History    None   Tobacco Use    Smoking status: Never Smoker    Smokeless tobacco: Never Used   Substance and Sexual Activity    Alcohol use: None    Drug use: None    Sexual activity: None   Other Topics Concern    None   Social History Narrative    None     Social Determinants of Health     Financial Resource Strain: Low Risk     Difficulty of Paying Living Expenses: Not hard at all   Food Insecurity: No Food Insecurity    Worried About Running Out of Food in the Last Year: Never true    Carlos of Food in the Last Year: Never true   Transportation Needs: No Transportation Needs    Lack of Transportation (Medical): No    Lack of Transportation (Non-Medical):  No Physical Activity:     Days of Exercise per Week:     Minutes of Exercise per Session:    Stress:     Feeling of Stress :    Social Connections:     Frequency of Communication with Friends and Family:     Frequency of Social Gatherings with Friends and Family:     Attends Sikh Services:     Active Member of Clubs or Organizations:     Attends Club or Organization Meetings:     Marital Status:    Intimate Partner Violence:     Fear of Current or Ex-Partner:     Emotionally Abused:     Physically Abused:     Sexually Abused:      Current Outpatient Medications   Medication Sig Dispense Refill    montelukast (SINGULAIR) 10 MG tablet TAKE ONE TABLET BY MOUTH NIGHTLY 90 tablet 3    amLODIPine (NORVASC) 10 MG tablet Take 1 tablet by mouth daily 90 tablet 3    atorvastatin (LIPITOR) 20 MG tablet Take 1 tablet by mouth daily 90 tablet 3    metoprolol (LOPRESSOR) 100 MG tablet Take 1 tablet by mouth 2 times daily 180 tablet 3    meloxicam (MOBIC) 15 MG tablet TAKE 1 TABLET BY MOUTH ONE TIME A DAY 90 tablet 3     No current facility-administered medications for this visit. Current Outpatient Medications on File Prior to Visit   Medication Sig Dispense Refill    montelukast (SINGULAIR) 10 MG tablet TAKE ONE TABLET BY MOUTH NIGHTLY 90 tablet 3    amLODIPine (NORVASC) 10 MG tablet Take 1 tablet by mouth daily 90 tablet 3    atorvastatin (LIPITOR) 20 MG tablet Take 1 tablet by mouth daily 90 tablet 3    metoprolol (LOPRESSOR) 100 MG tablet Take 1 tablet by mouth 2 times daily 180 tablet 3    meloxicam (MOBIC) 15 MG tablet TAKE 1 TABLET BY MOUTH ONE TIME A DAY 90 tablet 3     No current facility-administered medications on file prior to visit.      No Known Allergies  Health Maintenance   Topic Date Due    Hepatitis C screen  Never done    HIV screen  Never done    DTaP/Tdap/Td vaccine (1 - Tdap) Never done    Shingles Vaccine (1 of 2) Never done    Colon Cancer Screen FIT/FOBT  10/04/2019    There is no guarding or rebound. Hernia: No hernia is present. Genitourinary:     Penis: Normal.    Musculoskeletal:         General: No tenderness. Normal range of motion. Cervical back: Normal range of motion and neck supple. Lymphadenopathy:      Cervical: No cervical adenopathy. Skin:     General: Skin is warm and dry. Neurological:      Mental Status: He is alert and oriented to person, place, and time. Cranial Nerves: No cranial nerve deficit. Coordination: Coordination normal.         Assessment   Diagnosis Orders   1. Essential hypertension  CBC Auto Differential    Comprehensive Metabolic Panel    Lipid Panel   2. Screening for prostate cancer  PSA screening     Problem List     Hypertension - Primary    Relevant Medications    amLODIPine (NORVASC) 10 MG tablet    metoprolol (LOPRESSOR) 100 MG tablet    Other Relevant Orders    CBC Auto Differential    Comprehensive Metabolic Panel    Lipid Panel          Plan  Orders Placed This Encounter   Procedures    CBC Auto Differential     Standing Status:   Future     Standing Expiration Date:   6/22/2022    Comprehensive Metabolic Panel     Standing Status:   Future     Standing Expiration Date:   6/22/2022    Lipid Panel     Standing Status:   Future     Standing Expiration Date:   6/22/2022     Order Specific Question:   Is Patient Fasting?/# of Hours     Answer:   8    PSA screening     Standing Status:   Future     Standing Expiration Date:   6/22/2022     No orders of the defined types were placed in this encounter. No follow-ups on file.   Riley Neri MD

## 2021-08-26 DIAGNOSIS — I10 ESSENTIAL HYPERTENSION: ICD-10-CM

## 2021-08-26 RX ORDER — METOPROLOL TARTRATE 100 MG/1
TABLET ORAL
Qty: 180 TABLET | Refills: 3 | Status: SHIPPED | OUTPATIENT
Start: 2021-08-26 | End: 2022-08-29

## 2021-09-29 ENCOUNTER — TELEPHONE (OUTPATIENT)
Dept: FAMILY MEDICINE CLINIC | Age: 66
End: 2021-09-29

## 2021-09-30 DIAGNOSIS — I10 ESSENTIAL HYPERTENSION: ICD-10-CM

## 2021-09-30 DIAGNOSIS — M17.11 PRIMARY OSTEOARTHRITIS OF RIGHT KNEE: ICD-10-CM

## 2021-09-30 DIAGNOSIS — E78.5 HYPERLIPIDEMIA, UNSPECIFIED HYPERLIPIDEMIA TYPE: ICD-10-CM

## 2021-10-04 RX ORDER — AMLODIPINE BESYLATE 10 MG/1
10 TABLET ORAL DAILY
Qty: 90 TABLET | Refills: 3 | Status: SHIPPED | OUTPATIENT
Start: 2021-10-04 | End: 2022-09-06

## 2021-10-04 RX ORDER — MELOXICAM 15 MG/1
TABLET ORAL
Qty: 90 TABLET | Refills: 3 | Status: SHIPPED | OUTPATIENT
Start: 2021-10-04 | End: 2022-09-06

## 2021-10-04 RX ORDER — ATORVASTATIN CALCIUM 20 MG/1
20 TABLET, FILM COATED ORAL DAILY
Qty: 90 TABLET | Refills: 3 | Status: SHIPPED | OUTPATIENT
Start: 2021-10-04 | End: 2022-09-06

## 2021-12-16 ENCOUNTER — OFFICE VISIT (OUTPATIENT)
Dept: FAMILY MEDICINE CLINIC | Age: 66
End: 2021-12-16
Payer: MEDICARE

## 2021-12-16 VITALS
DIASTOLIC BLOOD PRESSURE: 76 MMHG | HEIGHT: 72 IN | WEIGHT: 266 LBS | RESPIRATION RATE: 12 BRPM | HEART RATE: 70 BPM | TEMPERATURE: 97.5 F | OXYGEN SATURATION: 96 % | BODY MASS INDEX: 36.03 KG/M2 | SYSTOLIC BLOOD PRESSURE: 138 MMHG

## 2021-12-16 DIAGNOSIS — I10 ESSENTIAL HYPERTENSION: Primary | ICD-10-CM

## 2021-12-16 DIAGNOSIS — E78.5 HYPERLIPIDEMIA, UNSPECIFIED HYPERLIPIDEMIA TYPE: ICD-10-CM

## 2021-12-16 DIAGNOSIS — E66.01 SEVERE OBESITY (BMI 35.0-35.9 WITH COMORBIDITY) (HCC): ICD-10-CM

## 2021-12-16 PROCEDURE — 4040F PNEUMOC VAC/ADMIN/RCVD: CPT | Performed by: FAMILY MEDICINE

## 2021-12-16 PROCEDURE — G8417 CALC BMI ABV UP PARAM F/U: HCPCS | Performed by: FAMILY MEDICINE

## 2021-12-16 PROCEDURE — G8484 FLU IMMUNIZE NO ADMIN: HCPCS | Performed by: FAMILY MEDICINE

## 2021-12-16 PROCEDURE — 1123F ACP DISCUSS/DSCN MKR DOCD: CPT | Performed by: FAMILY MEDICINE

## 2021-12-16 PROCEDURE — 1036F TOBACCO NON-USER: CPT | Performed by: FAMILY MEDICINE

## 2021-12-16 PROCEDURE — 3017F COLORECTAL CA SCREEN DOC REV: CPT | Performed by: FAMILY MEDICINE

## 2021-12-16 PROCEDURE — G8427 DOCREV CUR MEDS BY ELIG CLIN: HCPCS | Performed by: FAMILY MEDICINE

## 2021-12-16 PROCEDURE — 99213 OFFICE O/P EST LOW 20 MIN: CPT | Performed by: FAMILY MEDICINE

## 2021-12-16 RX ORDER — FEXOFENADINE HCL 180 MG/1
180 TABLET ORAL DAILY
Qty: 90 TABLET | Refills: 3 | Status: SHIPPED | OUTPATIENT
Start: 2021-12-16

## 2021-12-16 ASSESSMENT — ENCOUNTER SYMPTOMS
GASTROINTESTINAL NEGATIVE: 1
RESPIRATORY NEGATIVE: 1
EYES NEGATIVE: 1
ALLERGIC/IMMUNOLOGIC NEGATIVE: 1
SHORTNESS OF BREATH: 0

## 2021-12-16 NOTE — PROGRESS NOTES
Patient is seen in follow up for   Chief Complaint   Patient presents with    6 Month Follow-Up    Hypertension    Hyperlipidemia    Health Maintenance     due for CRC    Allergies     is taking singulair but doesn't seem to help     Hypertension  This is a chronic problem. The current episode started more than 1 year ago. The problem is unchanged. The problem is controlled. Pertinent negatives include no chest pain, palpitations or shortness of breath. There are no associated agents to hypertension. Risk factors for coronary artery disease include male gender. Past treatments include beta blockers and calcium channel blockers. The current treatment provides significant improvement. There are no compliance problems. Hyperlipidemia  This is a chronic problem. The current episode started more than 1 year ago. Recent lipid tests were reviewed and are normal. Pertinent negatives include no chest pain or shortness of breath. Current antihyperlipidemic treatment includes statins. Past Medical History:   Diagnosis Date    Hypertension      Patient Active Problem List    Diagnosis Date Noted    Primary osteoarthritis of right knee 02/02/2017    Hypertension      No past surgical history on file.   Family History   Problem Relation Age of Onset    High Blood Pressure Mother     Diabetes Mother     Cancer Mother         ovarian    High Blood Pressure Father     Diabetes Father     Cancer Father         lung     Social History     Socioeconomic History    Marital status:      Spouse name: None    Number of children: None    Years of education: None    Highest education level: None   Occupational History    None   Tobacco Use    Smoking status: Never Smoker    Smokeless tobacco: Never Used   Substance and Sexual Activity    Alcohol use: None    Drug use: None    Sexual activity: None   Other Topics Concern    None   Social History Narrative    None     Social Determinants of Health Financial Resource Strain: Low Risk     Difficulty of Paying Living Expenses: Not hard at all   Food Insecurity: No Food Insecurity    Worried About Running Out of Food in the Last Year: Never true    Carlos of Food in the Last Year: Never true   Transportation Needs: No Transportation Needs    Lack of Transportation (Medical): No    Lack of Transportation (Non-Medical): No   Physical Activity:     Days of Exercise per Week: Not on file    Minutes of Exercise per Session: Not on file   Stress:     Feeling of Stress : Not on file   Social Connections:     Frequency of Communication with Friends and Family: Not on file    Frequency of Social Gatherings with Friends and Family: Not on file    Attends Zoroastrianism Services: Not on file    Active Member of Clubs or Organizations: Not on file    Attends Club or Organization Meetings: Not on file    Marital Status: Not on file   Intimate Partner Violence:     Fear of Current or Ex-Partner: Not on file    Emotionally Abused: Not on file    Physically Abused: Not on file    Sexually Abused: Not on file   Housing Stability:     Unable to Pay for Housing in the Last Year: Not on file    Number of Places Lived in the Last Year: Not on file    Unstable Housing in the Last Year: Not on file     Current Outpatient Medications   Medication Sig Dispense Refill    fexofenadine (ALLEGRA) 180 MG tablet Take 1 tablet by mouth daily 90 tablet 3    atorvastatin (LIPITOR) 20 MG tablet TAKE 1 TABLET BY MOUTH  DAILY 90 tablet 3    meloxicam (MOBIC) 15 MG tablet TAKE 1 TABLET BY MOUTH ONCE DAILY 90 tablet 3    amLODIPine (NORVASC) 10 MG tablet TAKE 1 TABLET BY MOUTH  DAILY 90 tablet 3    metoprolol (LOPRESSOR) 100 MG tablet TAKE 1 TABLET BY MOUTH  TWICE DAILY 180 tablet 3    montelukast (SINGULAIR) 10 MG tablet TAKE ONE TABLET BY MOUTH NIGHTLY 90 tablet 3     No current facility-administered medications for this visit.      Current Outpatient Medications on File Prior to Visit   Medication Sig Dispense Refill    atorvastatin (LIPITOR) 20 MG tablet TAKE 1 TABLET BY MOUTH  DAILY 90 tablet 3    meloxicam (MOBIC) 15 MG tablet TAKE 1 TABLET BY MOUTH ONCE DAILY 90 tablet 3    amLODIPine (NORVASC) 10 MG tablet TAKE 1 TABLET BY MOUTH  DAILY 90 tablet 3    metoprolol (LOPRESSOR) 100 MG tablet TAKE 1 TABLET BY MOUTH  TWICE DAILY 180 tablet 3    montelukast (SINGULAIR) 10 MG tablet TAKE ONE TABLET BY MOUTH NIGHTLY 90 tablet 3     No current facility-administered medications on file prior to visit. No Known Allergies  Health Maintenance   Topic Date Due    Hepatitis C screen  Never done    HIV screen  Never done    DTaP/Tdap/Td vaccine (1 - Tdap) Never done    Shingles Vaccine (1 of 2) Never done    Colon Cancer Screen FIT/FOBT  10/04/2019    Pneumococcal 65+ years Vaccine (1 of 1 - PPSV23) Never done    Diabetes screen  04/02/2021    Annual Wellness Visit (AWV)  Never done    COVID-19 Vaccine (3 - Booster for Moderna series) 07/25/2021    Flu vaccine (1) 09/01/2021    Lipid screen  06/22/2022    Hepatitis A vaccine  Aged Out    Hepatitis B vaccine  Aged Out    Hib vaccine  Aged Out    Meningococcal (ACWY) vaccine  Aged Out       Review of Systems     Review of Systems   Constitutional: Negative for activity change, appetite change, chills, fever and unexpected weight change. HENT: Negative. Eyes: Negative. Respiratory: Negative. Negative for shortness of breath. Cardiovascular: Negative. Negative for chest pain and palpitations. Gastrointestinal: Negative. Endocrine: Negative. Genitourinary: Negative. Musculoskeletal: Negative. Skin: Negative. Allergic/Immunologic: Negative. Neurological: Negative. Hematological: Negative. Psychiatric/Behavioral: Negative.         Physical Exam  Vitals:    12/16/21 0816   BP: 138/76   Pulse: 70   Resp: 12   Temp: 97.5 °F (36.4 °C)   SpO2: 96%   Weight: 266 lb (120.7 kg)   Height: 6' (1.829 m)       Physical Exam  Constitutional:       Appearance: He is well-developed. HENT:      Right Ear: External ear normal.      Left Ear: External ear normal.   Eyes:      Conjunctiva/sclera: Conjunctivae normal.      Pupils: Pupils are equal, round, and reactive to light. Neck:      Thyroid: No thyromegaly. Cardiovascular:      Rate and Rhythm: Normal rate and regular rhythm. Heart sounds: Normal heart sounds. No murmur heard. No friction rub. No gallop. Pulmonary:      Effort: Pulmonary effort is normal. No respiratory distress. Breath sounds: Normal breath sounds. No wheezing. Abdominal:      General: Bowel sounds are normal. There is no distension. Palpations: Abdomen is soft. There is no mass. Tenderness: There is no abdominal tenderness. There is no guarding or rebound. Hernia: No hernia is present. Genitourinary:     Penis: Normal.    Musculoskeletal:         General: No tenderness. Normal range of motion. Cervical back: Normal range of motion and neck supple. Lymphadenopathy:      Cervical: No cervical adenopathy. Skin:     General: Skin is warm and dry. Neurological:      Mental Status: He is alert and oriented to person, place, and time. Cranial Nerves: No cranial nerve deficit. Coordination: Coordination normal.         Assessment   Diagnosis Orders   1. Essential hypertension     2. Severe obesity (BMI 35.0-35.9 with comorbidity) ([facility] [address]     3. Hyperlipidemia, unspecified hyperlipidemia type       Problem List     None          Plan  No orders of the defined types were placed in this encounter. Orders Placed This Encounter   Medications    fexofenadine (ALLEGRA) 180 MG tablet     Sig: Take 1 tablet by mouth daily     Dispense:  90 tablet     Refill:  3     Return in about 6 months (around 6/16/2022).   Marie Belle MD

## 2022-04-13 ENCOUNTER — TELEMEDICINE (OUTPATIENT)
Dept: PRIMARY CARE CLINIC | Age: 67
End: 2022-04-13
Payer: MEDICARE

## 2022-04-13 DIAGNOSIS — E66.01 SEVERE OBESITY (BMI 35.0-39.9) WITH COMORBIDITY (HCC): ICD-10-CM

## 2022-04-13 DIAGNOSIS — Z00.00 INITIAL MEDICARE ANNUAL WELLNESS VISIT: Primary | ICD-10-CM

## 2022-04-13 PROCEDURE — 1123F ACP DISCUSS/DSCN MKR DOCD: CPT | Performed by: NURSE PRACTITIONER

## 2022-04-13 PROCEDURE — 4040F PNEUMOC VAC/ADMIN/RCVD: CPT | Performed by: NURSE PRACTITIONER

## 2022-04-13 PROCEDURE — G0438 PPPS, INITIAL VISIT: HCPCS | Performed by: NURSE PRACTITIONER

## 2022-04-13 PROCEDURE — 3017F COLORECTAL CA SCREEN DOC REV: CPT | Performed by: NURSE PRACTITIONER

## 2022-04-13 SDOH — ECONOMIC STABILITY: FOOD INSECURITY: WITHIN THE PAST 12 MONTHS, THE FOOD YOU BOUGHT JUST DIDN'T LAST AND YOU DIDN'T HAVE MONEY TO GET MORE.: NEVER TRUE

## 2022-04-13 SDOH — ECONOMIC STABILITY: FOOD INSECURITY: WITHIN THE PAST 12 MONTHS, YOU WORRIED THAT YOUR FOOD WOULD RUN OUT BEFORE YOU GOT MONEY TO BUY MORE.: NEVER TRUE

## 2022-04-13 ASSESSMENT — PATIENT HEALTH QUESTIONNAIRE - PHQ9
SUM OF ALL RESPONSES TO PHQ QUESTIONS 1-9: 0
SUM OF ALL RESPONSES TO PHQ9 QUESTIONS 1 & 2: 0
2. FEELING DOWN, DEPRESSED OR HOPELESS: 0
1. LITTLE INTEREST OR PLEASURE IN DOING THINGS: 0
SUM OF ALL RESPONSES TO PHQ QUESTIONS 1-9: 0

## 2022-04-13 ASSESSMENT — LIFESTYLE VARIABLES
HOW OFTEN DURING THE LAST YEAR HAVE YOU BEEN UNABLE TO REMEMBER WHAT HAPPENED THE NIGHT BEFORE BECAUSE YOU HAD BEEN DRINKING: 0
HOW OFTEN DO YOU HAVE A DRINK CONTAINING ALCOHOL: 2-3 TIMES A WEEK
HOW MANY STANDARD DRINKS CONTAINING ALCOHOL DO YOU HAVE ON A TYPICAL DAY: 1 OR 2
HOW OFTEN DURING THE LAST YEAR HAVE YOU FOUND THAT YOU WERE NOT ABLE TO STOP DRINKING ONCE YOU HAD STARTED: 0
HOW OFTEN DURING THE LAST YEAR HAVE YOU FAILED TO DO WHAT WAS NORMALLY EXPECTED FROM YOU BECAUSE OF DRINKING: 0
HAVE YOU OR SOMEONE ELSE BEEN INJURED AS A RESULT OF YOUR DRINKING: 0
HAS A RELATIVE, FRIEND, DOCTOR, OR ANOTHER HEALTH PROFESSIONAL EXPRESSED CONCERN ABOUT YOUR DRINKING OR SUGGESTED YOU CUT DOWN: 0
HOW OFTEN DURING THE LAST YEAR HAVE YOU NEEDED AN ALCOHOLIC DRINK FIRST THING IN THE MORNING TO GET YOURSELF GOING AFTER A NIGHT OF HEAVY DRINKING: 0
HOW OFTEN DURING THE LAST YEAR HAVE YOU HAD A FEELING OF GUILT OR REMORSE AFTER DRINKING: 0

## 2022-04-13 ASSESSMENT — SOCIAL DETERMINANTS OF HEALTH (SDOH): HOW HARD IS IT FOR YOU TO PAY FOR THE VERY BASICS LIKE FOOD, HOUSING, MEDICAL CARE, AND HEATING?: NOT HARD AT ALL

## 2022-04-13 NOTE — PATIENT INSTRUCTIONS
Personalized Preventive Plan for Jamie Goltz - 4/13/2022  Medicare offers a range of preventive health benefits. Some of the tests and screenings are paid in full while other may be subject to a deductible, co-insurance, and/or copay. Some of these benefits include a comprehensive review of your medical history including lifestyle, illnesses that may run in your family, and various assessments and screenings as appropriate. After reviewing your medical record and screening and assessments performed today your provider may have ordered immunizations, labs, imaging, and/or referrals for you. A list of these orders (if applicable) as well as your Preventive Care list are included within your After Visit Summary for your review. Other Preventive Recommendations:    · A preventive eye exam performed by an eye specialist is recommended every 1-2 years to screen for glaucoma; cataracts, macular degeneration, and other eye disorders. · A preventive dental visit is recommended every 6 months. · Try to get at least 150 minutes of exercise per week or 10,000 steps per day on a pedometer . · Order or download the FREE \"Exercise & Physical Activity: Your Everyday Guide\" from [employer]. Call 1-230.878.8924 or search [employer] online. · You need 5530-2027 mg of calcium and 0630-7831 IU of vitamin D per day. It is possible to meet your calcium requirement with diet alone, but a vitamin D supplement is usually necessary to meet this goal.  · When exposed to the sun, use a sunscreen that protects against both UVA and UVB radiation with an SPF of 30 or greater. Reapply every 2 to 3 hours or after sweating, drying off with a towel, or swimming. · Always wear a seat belt when traveling in a car. Always wear a helmet when riding a bicycle or motorcycle.

## 2022-04-13 NOTE — PROGRESS NOTES
Medicare Annual Wellness Visit    Yandel Salazar is here for Medicare AWV    Assessment & Plan   Initial Medicare annual wellness visit  Severe obesity (BMI 35.0-39. 9) with comorbidity ([address]      Recommendations for Preventive Services Due: see orders and patient instructions/AVS.  Recommended screening schedule for the next 5-10 years is provided to the patient in written form: see Patient Instructions/AVS.     Return for Medicare Annual Wellness Visit in 1 year. Subjective       Patient's complete Health Risk Assessment and screening values have been reviewed and are found in Flowsheets. The following problems were reviewed today and where indicated follow up appointments were made and/or referrals ordered. Positive Risk Factor Screenings with Interventions:        Alcohol Screening:  AUDIT Total Score: 3    A score of 8 or more is associated with harmful or hazardous drinking. A score of 13 or more in women, and 15 or more in men, is likely to indicate alcohol dependence. Substance Abuse - Alcohol Interventions:  No concerns at this time.         General Health and ACP:  General  In general, how would you say your health is?: Excellent  In the past 7 days, have you experienced any of the following: New or Increased Pain, New or Increased Fatigue, Loneliness, Social Isolation, Stress or Anger?: No  Do you get the social and emotional support that you need?: Yes  Do you have a Living Will?: Yes    Advance Directives     Power of [address] Will ACP-Advance Directive ACP-Power of     Not on File Not on File Not on File Not on File      General Health Risk Interventions:  · No concerns at this time    Health Habits/Nutrition:     Physical Activity: Sufficiently Active    Days of Exercise per Week: 3 days    Minutes of Exercise per Session: 60 min     Have you lost any weight without trying in the past 3 months?: No     Have you seen the dentist within the past year?: Yes    Health Habits/Nutrition Interventions:  · Inadequate physical activity:  Patient will stay active and start to ride bike more when weather is better,             Objective      Patient-Reported Vitals  No data recorded            No Known Allergies  Prior to Visit Medications    Medication Sig Taking? Authorizing Provider   fexofenadine (ALLEGRA) 180 MG tablet Take 1 tablet by mouth daily  Jacek Loo MD   atorvastatin (LIPITOR) 20 MG tablet TAKE 1 TABLET BY MOUTH  DAILY  Jacek Loo MD   meloxicam (MOBIC) 15 MG tablet TAKE 1 TABLET BY MOUTH ONCE DAILY  Jacek Loo MD   amLODIPine (NORVASC) 10 MG tablet TAKE 1 TABLET BY MOUTH  DAILY  Jacek Loo MD   metoprolol (LOPRESSOR) 100 MG tablet TAKE 1 TABLET BY MOUTH  TWICE DAILY  Jacek Loo MD   montelukast (SINGULAIR) 10 MG tablet TAKE ONE TABLET BY MOUTH NIGHTLY  MD Waylon Novoa (Including outside providers/suppliers regularly involved in providing care):   Patient Care Team:  Jacek Loo MD as PCP - General (Family Medicine)  Jacek Loo MD as PCP - [facility] Empaneled Provider    Reviewed and updated this visit:  Tobacco  Allergies  Meds  Med Hx  Surg Hx  Soc Hx  Fam Hx           Tamsen Wirtz, was evaluated through a synchronous (real-time) audio-video encounter. The patient (or guardian if applicable) is aware that this is a billable service, which includes applicable co-pays. This Virtual Visit was conducted with patient's (and/or legal guardian's) consent. The visit was conducted pursuant to the emergency declaration under the [address] [place] [address] authority and the [employer] General Act. Patient identification was verified, and a caregiver was present when appropriate. The patient was located at home in a state where the provider was licensed to provide care.

## 2022-06-20 ENCOUNTER — COMMUNITY OUTREACH (OUTPATIENT)
Dept: FAMILY MEDICINE CLINIC | Age: 67
End: 2022-06-20

## 2022-06-20 NOTE — PROGRESS NOTES
Patient's HM shows they are overdue for Colorectal Screening. [employer] and  files searched without success. No results to attach to order nor HM updated. Note added to upcoming appointment to discuss Care Gap.

## 2022-06-22 ENCOUNTER — OFFICE VISIT (OUTPATIENT)
Dept: FAMILY MEDICINE CLINIC | Age: 67
End: 2022-06-22
Payer: MEDICARE

## 2022-06-22 VITALS
HEIGHT: 72 IN | RESPIRATION RATE: 13 BRPM | TEMPERATURE: 97.8 F | SYSTOLIC BLOOD PRESSURE: 132 MMHG | HEART RATE: 78 BPM | DIASTOLIC BLOOD PRESSURE: 78 MMHG | BODY MASS INDEX: 36.08 KG/M2 | OXYGEN SATURATION: 94 %

## 2022-06-22 DIAGNOSIS — E78.2 MIXED HYPERLIPIDEMIA: ICD-10-CM

## 2022-06-22 DIAGNOSIS — I10 PRIMARY HYPERTENSION: ICD-10-CM

## 2022-06-22 DIAGNOSIS — I10 PRIMARY HYPERTENSION: Primary | ICD-10-CM

## 2022-06-22 DIAGNOSIS — Z12.5 SCREENING PSA (PROSTATE SPECIFIC ANTIGEN): ICD-10-CM

## 2022-06-22 LAB
ALBUMIN SERPL-MCNC: 4.6 G/DL (ref 3.5–4.6)
ALP BLD-CCNC: 59 U/L (ref 35–104)
ALT SERPL-CCNC: 33 U/L (ref 0–41)
ANION GAP SERPL CALCULATED.3IONS-SCNC: 18 MEQ/L (ref 9–15)
AST SERPL-CCNC: 44 U/L (ref 0–40)
BASOPHILS ABSOLUTE: 0 K/UL (ref 0–0.2)
BASOPHILS RELATIVE PERCENT: 0.9 %
BILIRUB SERPL-MCNC: 1.1 MG/DL (ref 0.2–0.7)
BUN BLDV-MCNC: 19 MG/DL (ref 8–23)
CALCIUM SERPL-MCNC: 9.3 MG/DL (ref 8.5–9.9)
CHLORIDE BLD-SCNC: 103 MEQ/L (ref 95–107)
CHOLESTEROL, TOTAL: 185 MG/DL (ref 0–199)
CO2: 21 MEQ/L (ref 20–31)
CREAT SERPL-MCNC: 1.08 MG/DL (ref 0.7–1.2)
EOSINOPHILS ABSOLUTE: 0.1 K/UL (ref 0–0.7)
EOSINOPHILS RELATIVE PERCENT: 3.2 %
GFR AFRICAN AMERICAN: >60
GFR NON-AFRICAN AMERICAN: >60
GLOBULIN: 2.4 G/DL (ref 2.3–3.5)
GLUCOSE BLD-MCNC: 118 MG/DL (ref 70–99)
HCT VFR BLD CALC: 44 % (ref 42–52)
HDLC SERPL-MCNC: 65 MG/DL (ref 40–59)
HEMOGLOBIN: 14.6 G/DL (ref 14–18)
LDL CHOLESTEROL CALCULATED: 100 MG/DL (ref 0–129)
LYMPHOCYTES ABSOLUTE: 1.4 K/UL (ref 1–4.8)
LYMPHOCYTES RELATIVE PERCENT: 30.7 %
MCH RBC QN AUTO: 30.2 PG (ref 27–31.3)
MCHC RBC AUTO-ENTMCNC: 33.1 % (ref 33–37)
MCV RBC AUTO: 91.1 FL (ref 80–100)
MONOCYTES ABSOLUTE: 0.5 K/UL (ref 0.2–0.8)
MONOCYTES RELATIVE PERCENT: 11.1 %
NEUTROPHILS ABSOLUTE: 2.4 K/UL (ref 1.4–6.5)
NEUTROPHILS RELATIVE PERCENT: 54.1 %
PDW BLD-RTO: 13.9 % (ref 11.5–14.5)
PLATELET # BLD: 213 K/UL (ref 130–400)
POTASSIUM SERPL-SCNC: 4.7 MEQ/L (ref 3.4–4.9)
PROSTATE SPECIFIC ANTIGEN: 1.21 NG/ML (ref 0–4)
RBC # BLD: 4.83 M/UL (ref 4.7–6.1)
SODIUM BLD-SCNC: 142 MEQ/L (ref 135–144)
TOTAL PROTEIN: 7 G/DL (ref 6.3–8)
TRIGL SERPL-MCNC: 102 MG/DL (ref 0–150)
WBC # BLD: 4.5 K/UL (ref 4.8–10.8)

## 2022-06-22 PROCEDURE — 1123F ACP DISCUSS/DSCN MKR DOCD: CPT | Performed by: FAMILY MEDICINE

## 2022-06-22 PROCEDURE — 99213 OFFICE O/P EST LOW 20 MIN: CPT | Performed by: FAMILY MEDICINE

## 2022-06-22 PROCEDURE — 1036F TOBACCO NON-USER: CPT | Performed by: FAMILY MEDICINE

## 2022-06-22 PROCEDURE — G8427 DOCREV CUR MEDS BY ELIG CLIN: HCPCS | Performed by: FAMILY MEDICINE

## 2022-06-22 PROCEDURE — G8417 CALC BMI ABV UP PARAM F/U: HCPCS | Performed by: FAMILY MEDICINE

## 2022-06-22 PROCEDURE — 3017F COLORECTAL CA SCREEN DOC REV: CPT | Performed by: FAMILY MEDICINE

## 2022-06-22 ASSESSMENT — PATIENT HEALTH QUESTIONNAIRE - PHQ9
SUM OF ALL RESPONSES TO PHQ QUESTIONS 1-9: 0
1. LITTLE INTEREST OR PLEASURE IN DOING THINGS: 0
SUM OF ALL RESPONSES TO PHQ QUESTIONS 1-9: 0
SUM OF ALL RESPONSES TO PHQ9 QUESTIONS 1 & 2: 0
2. FEELING DOWN, DEPRESSED OR HOPELESS: 0
SUM OF ALL RESPONSES TO PHQ QUESTIONS 1-9: 0
SUM OF ALL RESPONSES TO PHQ QUESTIONS 1-9: 0

## 2022-06-22 ASSESSMENT — ENCOUNTER SYMPTOMS
SHORTNESS OF BREATH: 0
EYES NEGATIVE: 1
ALLERGIC/IMMUNOLOGIC NEGATIVE: 1
RESPIRATORY NEGATIVE: 1
GASTROINTESTINAL NEGATIVE: 1

## 2022-06-22 NOTE — PROGRESS NOTES
Patient is seen in follow up for   Chief Complaint   Patient presents with    6 Month Follow-Up    Hypertension    Hyperlipidemia     Hypertension  This is a chronic problem. The current episode started more than 1 year ago. The problem is unchanged. The problem is controlled. Pertinent negatives include no chest pain, palpitations or shortness of breath. There are no associated agents to hypertension. Risk factors for coronary artery disease include male gender. Past treatments include calcium channel blockers and beta blockers. The current treatment provides moderate improvement. There are no compliance problems. Hyperlipidemia  This is a chronic problem. The current episode started more than 1 year ago. The problem is controlled. Recent lipid tests were reviewed and are normal. There are no known factors aggravating his hyperlipidemia. Pertinent negatives include no chest pain or shortness of breath. Current antihyperlipidemic treatment includes statins. The current treatment provides significant improvement of lipids. There are no compliance problems. Risk factors for coronary artery disease include hypertension and male sex. Past Medical History:   Diagnosis Date    Hypertension      Patient Active Problem List    Diagnosis Date Noted    Primary osteoarthritis of right knee 02/02/2017    Hypertension      No past surgical history on file.   Family History   Problem Relation Age of Onset    High Blood Pressure Mother     Diabetes Mother     Cancer Mother         ovarian    High Blood Pressure Father     Diabetes Father     Cancer Father         lung     Social History     Socioeconomic History    Marital status:      Spouse name: None    Number of children: None    Years of education: None    Highest education level: None   Occupational History    None   Tobacco Use    Smoking status: Never Smoker    Smokeless tobacco: Never Used   Substance and Sexual Activity    Alcohol use: None    Drug use: None    Sexual activity: None   Other Topics Concern    None   Social History Narrative    None     Social Determinants of Health     Financial Resource Strain: Low Risk     Difficulty of Paying Living Expenses: Not hard at all   Food Insecurity: No Food Insecurity    Worried About Running Out of Food in the Last Year: Never true    Carlos of Food in the Last Year: Never true   Transportation Needs: No Transportation Needs    Lack of Transportation (Medical): No    Lack of Transportation (Non-Medical):  No   Physical Activity: Sufficiently Active    Days of Exercise per Week: 3 days    Minutes of Exercise per Session: 60 min   Stress:     Feeling of Stress : Not on file   Social Connections:     Frequency of Communication with Friends and Family: Not on file    Frequency of Social Gatherings with Friends and Family: Not on file    Attends Rastafari Services: Not on file    Active Member of Clubs or Organizations: Not on file    Attends Club or Organization Meetings: Not on file    Marital Status: Not on file   Intimate Partner Violence:     Fear of Current or Ex-Partner: Not on file    Emotionally Abused: Not on file    Physically Abused: Not on file    Sexually Abused: Not on file   Housing Stability:     Unable to Pay for Housing in the Last Year: Not on file    Number of [facility] in the Last Year: Not on file    Unstable Housing in the Last Year: Not on file     Current Outpatient Medications   Medication Sig Dispense Refill    fexofenadine (ALLEGRA) 180 MG tablet Take 1 tablet by mouth daily 90 tablet 3    atorvastatin (LIPITOR) 20 MG tablet TAKE 1 TABLET BY MOUTH  DAILY 90 tablet 3    meloxicam (MOBIC) 15 MG tablet TAKE 1 TABLET BY MOUTH ONCE DAILY 90 tablet 3    amLODIPine (NORVASC) 10 MG tablet TAKE 1 TABLET BY MOUTH  DAILY 90 tablet 3    metoprolol (LOPRESSOR) 100 MG tablet TAKE 1 TABLET BY MOUTH  TWICE DAILY 180 tablet 3     No current facility-administered medications for this visit. Current Outpatient Medications on File Prior to Visit   Medication Sig Dispense Refill    fexofenadine (ALLEGRA) 180 MG tablet Take 1 tablet by mouth daily 90 tablet 3    atorvastatin (LIPITOR) 20 MG tablet TAKE 1 TABLET BY MOUTH  DAILY 90 tablet 3    meloxicam (MOBIC) 15 MG tablet TAKE 1 TABLET BY MOUTH ONCE DAILY 90 tablet 3    amLODIPine (NORVASC) 10 MG tablet TAKE 1 TABLET BY MOUTH  DAILY 90 tablet 3    metoprolol (LOPRESSOR) 100 MG tablet TAKE 1 TABLET BY MOUTH  TWICE DAILY 180 tablet 3     No current facility-administered medications on file prior to visit. No Known Allergies  Health Maintenance   Topic Date Due    Hepatitis C screen  Never done    DTaP/Tdap/Td vaccine (1 - Tdap) Never done    Shingles vaccine (1 of 2) Never done    Colorectal Cancer Screen  10/04/2019    Pneumococcal 65+ years Vaccine (1 - PCV) Never done    Diabetes screen  04/02/2021    Lipids  06/22/2022    Prostate Specific Antigen (PSA) Screening or Monitoring  06/22/2022    Depression Screen  04/13/2023    Annual Wellness Visit (AWV)  04/14/2023    Flu vaccine  Completed    COVID-19 Vaccine  Completed    Hepatitis A vaccine  Aged Out    Hepatitis B vaccine  Aged Out    Hib vaccine  Aged Out    Meningococcal (ACWY) vaccine  Aged Out       Review of Systems     Review of Systems   Constitutional: Negative for activity change, appetite change, chills, fever and unexpected weight change. HENT: Negative. Eyes: Negative. Respiratory: Negative. Negative for shortness of breath. Cardiovascular: Negative. Negative for chest pain and palpitations. Gastrointestinal: Negative. Endocrine: Negative. Genitourinary: Negative. Musculoskeletal: Negative. Skin: Negative. Allergic/Immunologic: Negative. Neurological: Negative. Hematological: Negative. Psychiatric/Behavioral: Negative.         Physical Exam  Vitals:    06/22/22 0804   BP: 132/78   Pulse: 78   Resp: 13   Temp: 97.8 °F (36.6 °C)   SpO2: 94%   Height: 6' (1.829 m)       Physical Exam  Constitutional:       Appearance: He is well-developed. HENT:      Right Ear: External ear normal.      Left Ear: External ear normal.   Eyes:      Conjunctiva/sclera: Conjunctivae normal.      Pupils: Pupils are equal, round, and reactive to light. Neck:      Thyroid: No thyromegaly. Cardiovascular:      Rate and Rhythm: Normal rate and regular rhythm. Heart sounds: Normal heart sounds. No murmur heard. No friction rub. No gallop. Pulmonary:      Effort: Pulmonary effort is normal. No respiratory distress. Breath sounds: Normal breath sounds. No wheezing. Abdominal:      General: Bowel sounds are normal. There is no distension. Palpations: Abdomen is soft. There is no mass. Tenderness: There is no abdominal tenderness. There is no guarding or rebound. Hernia: No hernia is present. Genitourinary:     Penis: Normal.    Musculoskeletal:         General: No tenderness. Normal range of motion. Cervical back: Normal range of motion and neck supple. Lymphadenopathy:      Cervical: No cervical adenopathy. Skin:     General: Skin is warm and dry. Neurological:      Mental Status: He is alert and oriented to person, place, and time. Cranial Nerves: No cranial nerve deficit. Coordination: Coordination normal.         Assessment   Diagnosis Orders   1. Primary hypertension  Comprehensive Metabolic Panel    CBC with Auto Differential   2. Mixed hyperlipidemia  Lipid Panel    Comprehensive Metabolic Panel    CBC with Auto Differential   3.  Screening PSA (prostate specific antigen)  PSA Screening     Problem List     Hypertension - Primary    Relevant Medications    metoprolol (LOPRESSOR) 100 MG tablet    amLODIPine (NORVASC) 10 MG tablet    Other Relevant Orders    Comprehensive Metabolic Panel    CBC with Auto Differential          Plan  Orders Placed This Encounter   Procedures    Lipid Panel     Standing Status:   Future     Standing Expiration Date:   6/21/2023     Order Specific Question:   Is Patient Fasting?/# of Hours     Answer:   ?    Comprehensive Metabolic Panel     Standing Status:   Future     Standing Expiration Date:   6/21/2023    CBC with Auto Differential     Standing Status:   Future     Standing Expiration Date:   6/21/2023    PSA Screening     Standing Status:   Future     Standing Expiration Date:   6/21/2023     No orders of the defined types were placed in this encounter. No follow-ups on file.   Lucia Adair MD

## 2022-08-11 ENCOUNTER — COMMUNITY OUTREACH (OUTPATIENT)
Dept: FAMILY MEDICINE CLINIC | Age: 67
End: 2022-08-11

## 2022-08-26 DIAGNOSIS — I10 ESSENTIAL HYPERTENSION: ICD-10-CM

## 2022-08-27 NOTE — TELEPHONE ENCOUNTER
requesting medication refill.  Please approve or deny this request.    Rx requested:  Requested Prescriptions     Pending Prescriptions Disp Refills    metoprolol (LOPRESSOR) 100 MG tablet [Pharmacy Med Name: Metoprolol Tartrate 100 MG Oral Tablet] 180 tablet 3     Sig: TAKE 1 TABLET BY MOUTH  TWICE DAILY         Last Office Visit:   6/22/2022      Next Visit Date:  Future Appointments   Date Time Provider Stan Felix   6/21/2023  8:00 AM Swati Cassidy  [place], [place] [address]

## 2022-08-29 RX ORDER — METOPROLOL TARTRATE 100 MG/1
TABLET ORAL
Qty: 180 TABLET | Refills: 3 | Status: SHIPPED | OUTPATIENT
Start: 2022-08-29

## 2022-09-05 DIAGNOSIS — E78.5 HYPERLIPIDEMIA, UNSPECIFIED HYPERLIPIDEMIA TYPE: ICD-10-CM

## 2022-09-05 DIAGNOSIS — I10 ESSENTIAL HYPERTENSION: ICD-10-CM

## 2022-09-05 DIAGNOSIS — M17.11 PRIMARY OSTEOARTHRITIS OF RIGHT KNEE: ICD-10-CM

## 2022-09-06 RX ORDER — AMLODIPINE BESYLATE 10 MG/1
10 TABLET ORAL DAILY
Qty: 90 TABLET | Refills: 1 | Status: SHIPPED | OUTPATIENT
Start: 2022-09-06

## 2022-09-06 RX ORDER — MELOXICAM 15 MG/1
TABLET ORAL
Qty: 90 TABLET | Refills: 1 | Status: SHIPPED | OUTPATIENT
Start: 2022-09-06

## 2022-09-06 RX ORDER — ATORVASTATIN CALCIUM 20 MG/1
20 TABLET, FILM COATED ORAL DAILY
Qty: 90 TABLET | Refills: 1 | Status: SHIPPED | OUTPATIENT
Start: 2022-09-06

## 2023-01-06 ENCOUNTER — TELEPHONE (OUTPATIENT)
Dept: GASTROENTEROLOGY | Age: 68
End: 2023-01-06

## 2023-02-10 DIAGNOSIS — E78.5 HYPERLIPIDEMIA, UNSPECIFIED HYPERLIPIDEMIA TYPE: ICD-10-CM

## 2023-02-10 DIAGNOSIS — I10 ESSENTIAL HYPERTENSION: ICD-10-CM

## 2023-02-10 DIAGNOSIS — M17.11 PRIMARY OSTEOARTHRITIS OF RIGHT KNEE: ICD-10-CM

## 2023-02-13 RX ORDER — MELOXICAM 15 MG/1
TABLET ORAL
Qty: 90 TABLET | Refills: 1 | Status: SHIPPED | OUTPATIENT
Start: 2023-02-13

## 2023-02-13 RX ORDER — ATORVASTATIN CALCIUM 20 MG/1
TABLET, FILM COATED ORAL
Qty: 90 TABLET | Refills: 1 | Status: SHIPPED | OUTPATIENT
Start: 2023-02-13

## 2023-02-13 RX ORDER — AMLODIPINE BESYLATE 10 MG/1
10 TABLET ORAL DAILY
Qty: 90 TABLET | Refills: 1 | Status: SHIPPED | OUTPATIENT
Start: 2023-02-13

## 2023-06-14 DIAGNOSIS — Z12.5 SCREENING PSA (PROSTATE SPECIFIC ANTIGEN): ICD-10-CM

## 2023-06-14 DIAGNOSIS — E78.2 MIXED HYPERLIPIDEMIA: ICD-10-CM

## 2023-06-14 DIAGNOSIS — I10 PRIMARY HYPERTENSION: ICD-10-CM

## 2023-06-14 LAB
ALBUMIN SERPL-MCNC: 4.5 G/DL (ref 3.5–4.6)
ALP SERPL-CCNC: 64 U/L (ref 35–104)
ALT SERPL-CCNC: 37 U/L (ref 0–41)
ANION GAP SERPL CALCULATED.3IONS-SCNC: 16 MEQ/L (ref 9–15)
AST SERPL-CCNC: 30 U/L (ref 0–40)
BASOPHILS # BLD: 0.1 K/UL (ref 0–0.2)
BASOPHILS NFR BLD: 1.2 %
BILIRUB SERPL-MCNC: 1.2 MG/DL (ref 0.2–0.7)
BUN SERPL-MCNC: 17 MG/DL (ref 8–23)
CALCIUM SERPL-MCNC: 9.4 MG/DL (ref 8.5–9.9)
CHLORIDE SERPL-SCNC: 104 MEQ/L (ref 95–107)
CHOLEST SERPL-MCNC: 152 MG/DL (ref 0–199)
CO2 SERPL-SCNC: 23 MEQ/L (ref 20–31)
CREAT SERPL-MCNC: 0.99 MG/DL (ref 0.7–1.2)
EOSINOPHIL # BLD: 0.2 K/UL (ref 0–0.7)
EOSINOPHIL NFR BLD: 3.8 %
ERYTHROCYTE [DISTWIDTH] IN BLOOD BY AUTOMATED COUNT: 13.4 % (ref 11.5–14.5)
GLOBULIN SER CALC-MCNC: 2.5 G/DL (ref 2.3–3.5)
GLUCOSE SERPL-MCNC: 106 MG/DL (ref 70–99)
HCT VFR BLD AUTO: 44.5 % (ref 42–52)
HDLC SERPL-MCNC: 46 MG/DL (ref 40–59)
HGB BLD-MCNC: 14.5 G/DL (ref 14–18)
LDLC SERPL CALC-MCNC: 85 MG/DL (ref 0–129)
LYMPHOCYTES # BLD: 1.7 K/UL (ref 1–4.8)
LYMPHOCYTES NFR BLD: 31.3 %
MCH RBC QN AUTO: 28.9 PG (ref 27–31.3)
MCHC RBC AUTO-ENTMCNC: 32.7 % (ref 33–37)
MCV RBC AUTO: 88.6 FL (ref 79–92.2)
MONOCYTES # BLD: 0.5 K/UL (ref 0.2–0.8)
MONOCYTES NFR BLD: 9.8 %
NEUTROPHILS # BLD: 2.9 K/UL (ref 1.4–6.5)
NEUTS SEG NFR BLD: 53.9 %
PLATELET # BLD AUTO: 268 K/UL (ref 130–400)
POTASSIUM SERPL-SCNC: 4.6 MEQ/L (ref 3.4–4.9)
PROT SERPL-MCNC: 7 G/DL (ref 6.3–8)
PSA SERPL-MCNC: 1.15 NG/ML (ref 0–4)
RBC # BLD AUTO: 5.02 M/UL (ref 4.7–6.1)
SODIUM SERPL-SCNC: 143 MEQ/L (ref 135–144)
TRIGL SERPL-MCNC: 106 MG/DL (ref 0–150)
WBC # BLD AUTO: 5.3 K/UL (ref 4.8–10.8)

## 2023-07-27 DIAGNOSIS — I10 ESSENTIAL HYPERTENSION: ICD-10-CM

## 2023-07-27 DIAGNOSIS — M17.11 PRIMARY OSTEOARTHRITIS OF RIGHT KNEE: ICD-10-CM

## 2023-07-28 DIAGNOSIS — I10 ESSENTIAL HYPERTENSION: ICD-10-CM

## 2023-07-28 DIAGNOSIS — E78.5 HYPERLIPIDEMIA, UNSPECIFIED HYPERLIPIDEMIA TYPE: ICD-10-CM

## 2023-07-28 RX ORDER — ATORVASTATIN CALCIUM 20 MG/1
TABLET, FILM COATED ORAL
Qty: 90 TABLET | Refills: 3 | Status: SHIPPED | OUTPATIENT
Start: 2023-07-28

## 2023-07-28 RX ORDER — METOPROLOL TARTRATE 100 MG/1
TABLET ORAL
Qty: 180 TABLET | Refills: 3 | Status: SHIPPED | OUTPATIENT
Start: 2023-07-28

## 2023-07-28 RX ORDER — MELOXICAM 15 MG/1
TABLET ORAL
Qty: 90 TABLET | Refills: 3 | Status: SHIPPED | OUTPATIENT
Start: 2023-07-28

## 2023-07-28 RX ORDER — AMLODIPINE BESYLATE 10 MG/1
10 TABLET ORAL DAILY
Qty: 90 TABLET | Refills: 3 | Status: SHIPPED | OUTPATIENT
Start: 2023-07-28

## 2023-12-13 ENCOUNTER — OFFICE VISIT (OUTPATIENT)
Dept: FAMILY MEDICINE CLINIC | Age: 68
End: 2023-12-13
Payer: MEDICARE

## 2023-12-13 VITALS
DIASTOLIC BLOOD PRESSURE: 86 MMHG | SYSTOLIC BLOOD PRESSURE: 136 MMHG | RESPIRATION RATE: 18 BRPM | HEART RATE: 72 BPM | WEIGHT: 311 LBS | OXYGEN SATURATION: 92 % | BODY MASS INDEX: 42.12 KG/M2 | HEIGHT: 72 IN

## 2023-12-13 DIAGNOSIS — E66.01 OBESITY, CLASS III, BMI 40-49.9 (MORBID OBESITY) (HCC): ICD-10-CM

## 2023-12-13 DIAGNOSIS — E78.5 HYPERLIPIDEMIA, UNSPECIFIED HYPERLIPIDEMIA TYPE: ICD-10-CM

## 2023-12-13 DIAGNOSIS — I10 ESSENTIAL HYPERTENSION: Primary | ICD-10-CM

## 2023-12-13 PROCEDURE — 3017F COLORECTAL CA SCREEN DOC REV: CPT | Performed by: FAMILY MEDICINE

## 2023-12-13 PROCEDURE — 1123F ACP DISCUSS/DSCN MKR DOCD: CPT | Performed by: FAMILY MEDICINE

## 2023-12-13 PROCEDURE — 99213 OFFICE O/P EST LOW 20 MIN: CPT | Performed by: FAMILY MEDICINE

## 2023-12-13 PROCEDURE — 1036F TOBACCO NON-USER: CPT | Performed by: FAMILY MEDICINE

## 2023-12-13 PROCEDURE — G8427 DOCREV CUR MEDS BY ELIG CLIN: HCPCS | Performed by: FAMILY MEDICINE

## 2023-12-13 PROCEDURE — 3075F SYST BP GE 130 - 139MM HG: CPT | Performed by: FAMILY MEDICINE

## 2023-12-13 PROCEDURE — 3079F DIAST BP 80-89 MM HG: CPT | Performed by: FAMILY MEDICINE

## 2023-12-13 PROCEDURE — G8417 CALC BMI ABV UP PARAM F/U: HCPCS | Performed by: FAMILY MEDICINE

## 2023-12-13 PROCEDURE — G8484 FLU IMMUNIZE NO ADMIN: HCPCS | Performed by: FAMILY MEDICINE

## 2023-12-13 RX ORDER — METHYLPREDNISOLONE 4 MG/1
TABLET ORAL
Qty: 1 KIT | Refills: 0 | Status: SHIPPED | OUTPATIENT
Start: 2023-12-13 | End: 2023-12-19

## 2023-12-13 ASSESSMENT — ENCOUNTER SYMPTOMS
SHORTNESS OF BREATH: 0
GASTROINTESTINAL NEGATIVE: 1
EYES NEGATIVE: 1
ALLERGIC/IMMUNOLOGIC NEGATIVE: 1
RESPIRATORY NEGATIVE: 1

## 2023-12-13 NOTE — PROGRESS NOTES
and unexpected weight change. HENT: Negative. Eyes: Negative. Respiratory: Negative. Negative for shortness of breath. Cardiovascular: Negative. Negative for chest pain and palpitations. Gastrointestinal: Negative. Endocrine: Negative. Genitourinary: Negative. Musculoskeletal: Negative. Skin: Negative. Allergic/Immunologic: Negative. Neurological: Negative. Hematological: Negative. Psychiatric/Behavioral: Negative. Physical Exam  Vitals:    12/13/23 0800 12/13/23 0807 12/13/23 0816   BP: (!) 146/82 (!) 146/82 136/86   Pulse: 72     Resp: 18     SpO2: 92%     Weight: (!) 141.1 kg (311 lb)     Height: 1.829 m (6')         Physical Exam  Constitutional:       Appearance: He is well-developed. HENT:      Right Ear: External ear normal.      Left Ear: External ear normal.   Eyes:      Conjunctiva/sclera: Conjunctivae normal.      Pupils: Pupils are equal, round, and reactive to light. Neck:      Thyroid: No thyromegaly. Cardiovascular:      Rate and Rhythm: Normal rate and regular rhythm. Heart sounds: Normal heart sounds. No murmur heard. No friction rub. No gallop. Pulmonary:      Effort: Pulmonary effort is normal. No respiratory distress. Breath sounds: Normal breath sounds. No wheezing. Abdominal:      General: Bowel sounds are normal. There is no distension. Palpations: Abdomen is soft. There is no mass. Tenderness: There is no abdominal tenderness. There is no guarding or rebound. Hernia: No hernia is present. Genitourinary:     Penis: Normal.    Musculoskeletal:         General: No tenderness. Normal range of motion. Cervical back: Normal range of motion and neck supple. Lymphadenopathy:      Cervical: No cervical adenopathy. Skin:     General: Skin is warm and dry. Neurological:      Mental Status: He is alert and oriented to person, place, and time. Cranial Nerves: No cranial nerve deficit.

## 2024-06-07 ASSESSMENT — PATIENT HEALTH QUESTIONNAIRE - PHQ9
SUM OF ALL RESPONSES TO PHQ9 QUESTIONS 1 & 2: 0
SUM OF ALL RESPONSES TO PHQ9 QUESTIONS 1 & 2: 0
SUM OF ALL RESPONSES TO PHQ QUESTIONS 1-9: 0
1. LITTLE INTEREST OR PLEASURE IN DOING THINGS: NOT AT ALL
1. LITTLE INTEREST OR PLEASURE IN DOING THINGS: NOT AT ALL
SUM OF ALL RESPONSES TO PHQ QUESTIONS 1-9: 0
2. FEELING DOWN, DEPRESSED OR HOPELESS: NOT AT ALL
SUM OF ALL RESPONSES TO PHQ QUESTIONS 1-9: 0
SUM OF ALL RESPONSES TO PHQ QUESTIONS 1-9: 0
2. FEELING DOWN, DEPRESSED OR HOPELESS: NOT AT ALL

## 2024-06-10 ENCOUNTER — OFFICE VISIT (OUTPATIENT)
Dept: FAMILY MEDICINE CLINIC | Age: 69
End: 2024-06-10
Payer: MEDICARE

## 2024-06-10 VITALS
SYSTOLIC BLOOD PRESSURE: 136 MMHG | WEIGHT: 314 LBS | HEIGHT: 72 IN | OXYGEN SATURATION: 98 % | BODY MASS INDEX: 42.53 KG/M2 | DIASTOLIC BLOOD PRESSURE: 82 MMHG | RESPIRATION RATE: 18 BRPM | HEART RATE: 68 BPM

## 2024-06-10 DIAGNOSIS — I10 PRIMARY HYPERTENSION: Primary | ICD-10-CM

## 2024-06-10 DIAGNOSIS — I10 PRIMARY HYPERTENSION: ICD-10-CM

## 2024-06-10 DIAGNOSIS — Z12.5 SCREENING PSA (PROSTATE SPECIFIC ANTIGEN): ICD-10-CM

## 2024-06-10 DIAGNOSIS — E78.2 MIXED HYPERLIPIDEMIA: ICD-10-CM

## 2024-06-10 LAB
ALBUMIN SERPL-MCNC: 4.7 G/DL (ref 3.5–4.6)
ALP SERPL-CCNC: 84 U/L (ref 35–104)
ALT SERPL-CCNC: 43 U/L (ref 0–41)
ANION GAP SERPL CALCULATED.3IONS-SCNC: 13 MEQ/L (ref 9–15)
AST SERPL-CCNC: 29 U/L (ref 0–40)
BASOPHILS # BLD: 0.1 K/UL (ref 0–0.2)
BASOPHILS NFR BLD: 1 %
BILIRUB SERPL-MCNC: 1.5 MG/DL (ref 0.2–0.7)
BUN SERPL-MCNC: 15 MG/DL (ref 8–23)
CALCIUM SERPL-MCNC: 9.7 MG/DL (ref 8.5–9.9)
CHLORIDE SERPL-SCNC: 103 MEQ/L (ref 95–107)
CHOLEST SERPL-MCNC: 171 MG/DL (ref 0–199)
CO2 SERPL-SCNC: 25 MEQ/L (ref 20–31)
CREAT SERPL-MCNC: 0.98 MG/DL (ref 0.7–1.2)
EOSINOPHIL # BLD: 0.2 K/UL (ref 0–0.7)
EOSINOPHIL NFR BLD: 3.3 %
ERYTHROCYTE [DISTWIDTH] IN BLOOD BY AUTOMATED COUNT: 13.2 % (ref 11.5–14.5)
GLOBULIN SER CALC-MCNC: 2.9 G/DL (ref 2.3–3.5)
GLUCOSE SERPL-MCNC: 117 MG/DL (ref 70–99)
HCT VFR BLD AUTO: 46.4 % (ref 42–52)
HDLC SERPL-MCNC: 43 MG/DL (ref 40–59)
HGB BLD-MCNC: 15.8 G/DL (ref 14–18)
LDLC SERPL CALC-MCNC: 103 MG/DL (ref 0–129)
LYMPHOCYTES # BLD: 1.7 K/UL (ref 1–4.8)
LYMPHOCYTES NFR BLD: 24.9 %
MCH RBC QN AUTO: 29.2 PG (ref 27–31.3)
MCHC RBC AUTO-ENTMCNC: 34.1 % (ref 33–37)
MCV RBC AUTO: 85.8 FL (ref 79–92.2)
MONOCYTES # BLD: 0.6 K/UL (ref 0.2–0.8)
MONOCYTES NFR BLD: 8.7 %
NEUTROPHILS # BLD: 4.2 K/UL (ref 1.4–6.5)
NEUTS SEG NFR BLD: 61.4 %
PLATELET # BLD AUTO: 224 K/UL (ref 130–400)
POTASSIUM SERPL-SCNC: 4.5 MEQ/L (ref 3.4–4.9)
PROT SERPL-MCNC: 7.6 G/DL (ref 6.3–8)
PSA SERPL-MCNC: 1.18 NG/ML (ref 0–4)
RBC # BLD AUTO: 5.41 M/UL (ref 4.7–6.1)
SODIUM SERPL-SCNC: 141 MEQ/L (ref 135–144)
TRIGL SERPL-MCNC: 123 MG/DL (ref 0–150)
WBC # BLD AUTO: 6.9 K/UL (ref 4.8–10.8)

## 2024-06-10 PROCEDURE — 3079F DIAST BP 80-89 MM HG: CPT | Performed by: FAMILY MEDICINE

## 2024-06-10 PROCEDURE — 3075F SYST BP GE 130 - 139MM HG: CPT | Performed by: FAMILY MEDICINE

## 2024-06-10 PROCEDURE — 1123F ACP DISCUSS/DSCN MKR DOCD: CPT | Performed by: FAMILY MEDICINE

## 2024-06-10 PROCEDURE — 1036F TOBACCO NON-USER: CPT | Performed by: FAMILY MEDICINE

## 2024-06-10 PROCEDURE — G8417 CALC BMI ABV UP PARAM F/U: HCPCS | Performed by: FAMILY MEDICINE

## 2024-06-10 PROCEDURE — G8428 CUR MEDS NOT DOCUMENT: HCPCS | Performed by: FAMILY MEDICINE

## 2024-06-10 PROCEDURE — 3017F COLORECTAL CA SCREEN DOC REV: CPT | Performed by: FAMILY MEDICINE

## 2024-06-10 PROCEDURE — 99214 OFFICE O/P EST MOD 30 MIN: CPT | Performed by: FAMILY MEDICINE

## 2024-06-10 ASSESSMENT — ENCOUNTER SYMPTOMS
EYES NEGATIVE: 1
RESPIRATORY NEGATIVE: 1
SHORTNESS OF BREATH: 0
ALLERGIC/IMMUNOLOGIC NEGATIVE: 1
GASTROINTESTINAL NEGATIVE: 1

## 2024-06-10 NOTE — PROGRESS NOTES
Patient is seen in follow up for   Chief Complaint   Patient presents with    6 Month Follow-Up    Hypertension    Hyperlipidemia     Hypertension  This is a chronic problem. The current episode started more than 1 year ago. The problem is unchanged. The problem is controlled. Pertinent negatives include no chest pain, palpitations or shortness of breath. There are no associated agents to hypertension. Risk factors for coronary artery disease include male gender. The current treatment provides significant improvement. There are no compliance problems.    Hyperlipidemia  This is a chronic problem. The current episode started more than 1 year ago. The problem is controlled. Pertinent negatives include no chest pain or shortness of breath.       Past Medical History:   Diagnosis Date    Hypertension      Patient Active Problem List    Diagnosis Date Noted    Primary osteoarthritis of right knee 02/02/2017    Hypertension      No past surgical history on file.  Family History   Problem Relation Age of Onset    High Blood Pressure Mother     Diabetes Mother     Cancer Mother         ovarian    High Blood Pressure Father     Diabetes Father     Cancer Father         lung     Social History     Socioeconomic History    Marital status:      Spouse name: None    Number of children: None    Years of education: None    Highest education level: None   Tobacco Use    Smoking status: Never    Smokeless tobacco: Never     Social Determinants of Health     Financial Resource Strain: Low Risk  (6/11/2023)    Overall Financial Resource Strain (CARDIA)     Difficulty of Paying Living Expenses: Not hard at all   Transportation Needs: Unknown (6/11/2023)    PRAPARE - Transportation     Lack of Transportation (Non-Medical): No   Physical Activity: Sufficiently Active (6/14/2023)    Exercise Vital Sign     Days of Exercise per Week: 5 days     Minutes of Exercise per Session: 60 min   Housing Stability: Unknown (6/11/2023)

## 2024-06-18 ENCOUNTER — TELEMEDICINE (OUTPATIENT)
Dept: FAMILY MEDICINE CLINIC | Age: 69
End: 2024-06-18

## 2024-06-18 DIAGNOSIS — Z00.00 MEDICARE ANNUAL WELLNESS VISIT, SUBSEQUENT: Primary | ICD-10-CM

## 2024-06-18 SDOH — ECONOMIC STABILITY: HOUSING INSECURITY
IN THE LAST 12 MONTHS, WAS THERE A TIME WHEN YOU DID NOT HAVE A STEADY PLACE TO SLEEP OR SLEPT IN A SHELTER (INCLUDING NOW)?: NO

## 2024-06-18 SDOH — ECONOMIC STABILITY: FOOD INSECURITY: WITHIN THE PAST 12 MONTHS, THE FOOD YOU BOUGHT JUST DIDN'T LAST AND YOU DIDN'T HAVE MONEY TO GET MORE.: NEVER TRUE

## 2024-06-18 SDOH — ECONOMIC STABILITY: FOOD INSECURITY: WITHIN THE PAST 12 MONTHS, YOU WORRIED THAT YOUR FOOD WOULD RUN OUT BEFORE YOU GOT MONEY TO BUY MORE.: NEVER TRUE

## 2024-06-18 SDOH — ECONOMIC STABILITY: INCOME INSECURITY: HOW HARD IS IT FOR YOU TO PAY FOR THE VERY BASICS LIKE FOOD, HOUSING, MEDICAL CARE, AND HEATING?: NOT HARD AT ALL

## 2024-06-18 ASSESSMENT — PATIENT HEALTH QUESTIONNAIRE - PHQ9
SUM OF ALL RESPONSES TO PHQ9 QUESTIONS 1 & 2: 0
2. FEELING DOWN, DEPRESSED OR HOPELESS: NOT AT ALL
1. LITTLE INTEREST OR PLEASURE IN DOING THINGS: NOT AT ALL
SUM OF ALL RESPONSES TO PHQ QUESTIONS 1-9: 0

## 2024-06-18 ASSESSMENT — LIFESTYLE VARIABLES
HOW MANY STANDARD DRINKS CONTAINING ALCOHOL DO YOU HAVE ON A TYPICAL DAY: PATIENT DOES NOT DRINK
HOW OFTEN DO YOU HAVE A DRINK CONTAINING ALCOHOL: NEVER

## 2024-06-18 NOTE — PROGRESS NOTES
Team:  Christiano Sapp MD as PCP - General (Family Medicine)  Christiano Sapp MD as PCP - Empaneled Provider     Reviewed and updated this visit:  Allergies  Meds          Kane Gibbs, was evaluated through a synchronous (real-time) audio-video encounter. The patient (or guardian if applicable) is aware that this is a billable service, which includes applicable co-pays. This Virtual Visit was conducted with patient's (and/or legal guardian's) consent. Patient identification was verified, and a caregiver was present when appropriate.   The patient was located at Home: [address] [place], [place] [id number]  Provider was located at Facility (Appt Dept): [address] [place] [address]  Confirm you are appropriately licensed, registered, or certified to deliver care in the state where the patient is located as indicated above. If you are not or unsure, please re-schedule the visit: Yes, I confirm.

## 2024-06-21 ENCOUNTER — APPOINTMENT (OUTPATIENT)
Dept: GENERAL RADIOLOGY | Age: 69
DRG: 481 | End: 2024-06-21
Payer: COMMERCIAL

## 2024-06-21 ENCOUNTER — NURSE TRIAGE (OUTPATIENT)
Dept: OTHER | Facility: CLINIC | Age: 69
End: 2024-06-21

## 2024-06-21 ENCOUNTER — APPOINTMENT (OUTPATIENT)
Dept: CT IMAGING | Age: 69
DRG: 481 | End: 2024-06-21
Payer: COMMERCIAL

## 2024-06-21 ENCOUNTER — HOSPITAL ENCOUNTER (INPATIENT)
Age: 69
LOS: 1 days | Discharge: NURSING, REHAB OR LONG-TERM CARE | DRG: 481 | End: 2024-06-22
Attending: EMERGENCY MEDICINE | Admitting: SURGERY
Payer: COMMERCIAL

## 2024-06-21 ENCOUNTER — ANESTHESIA EVENT (OUTPATIENT)
Dept: OPERATING ROOM | Age: 69
End: 2024-06-21
Payer: COMMERCIAL

## 2024-06-21 ENCOUNTER — ANESTHESIA (OUTPATIENT)
Dept: OPERATING ROOM | Age: 69
End: 2024-06-21
Payer: COMMERCIAL

## 2024-06-21 DIAGNOSIS — G89.18 POST-OP PAIN: ICD-10-CM

## 2024-06-21 DIAGNOSIS — S72.401A CLOSED FRACTURE OF DISTAL END OF RIGHT FEMUR, UNSPECIFIED FRACTURE MORPHOLOGY, INITIAL ENCOUNTER (HCC): Primary | ICD-10-CM

## 2024-06-21 LAB
ABO + RH BLD: NORMAL
ALBUMIN SERPL-MCNC: 4.5 G/DL (ref 3.5–4.6)
ALP SERPL-CCNC: 81 U/L (ref 35–104)
ALT SERPL-CCNC: 44 U/L (ref 0–41)
ANION GAP SERPL CALCULATED.3IONS-SCNC: 12 MEQ/L (ref 9–15)
AST SERPL-CCNC: 31 U/L (ref 0–40)
BASOPHILS # BLD: 0.1 K/UL (ref 0–0.2)
BASOPHILS NFR BLD: 1.3 %
BILIRUB SERPL-MCNC: 1.5 MG/DL (ref 0.2–0.7)
BLD GP AB SCN SERPL QL: NORMAL
BUN SERPL-MCNC: 19 MG/DL (ref 8–23)
CALCIUM SERPL-MCNC: 9.8 MG/DL (ref 8.5–9.9)
CHLORIDE SERPL-SCNC: 102 MEQ/L (ref 95–107)
CO2 SERPL-SCNC: 26 MEQ/L (ref 20–31)
CREAT SERPL-MCNC: 0.96 MG/DL (ref 0.7–1.2)
EOSINOPHIL # BLD: 0.2 K/UL (ref 0–0.7)
EOSINOPHIL NFR BLD: 3.7 %
ERYTHROCYTE [DISTWIDTH] IN BLOOD BY AUTOMATED COUNT: 13.1 % (ref 11.5–14.5)
GLOBULIN SER CALC-MCNC: 3 G/DL (ref 2.3–3.5)
GLUCOSE SERPL-MCNC: 156 MG/DL (ref 70–99)
HCT VFR BLD AUTO: 46 % (ref 42–52)
HGB BLD-MCNC: 15.4 G/DL (ref 14–18)
INR PPP: 1
LYMPHOCYTES # BLD: 1.7 K/UL (ref 1–4.8)
LYMPHOCYTES NFR BLD: 31.4 %
MCH RBC QN AUTO: 28.9 PG (ref 27–31.3)
MCHC RBC AUTO-ENTMCNC: 33.5 % (ref 33–37)
MCV RBC AUTO: 86.5 FL (ref 79–92.2)
MONOCYTES # BLD: 0.6 K/UL (ref 0.2–0.8)
MONOCYTES NFR BLD: 10.2 %
NEUTROPHILS # BLD: 2.8 K/UL (ref 1.4–6.5)
NEUTS SEG NFR BLD: 52.5 %
PLATELET # BLD AUTO: 223 K/UL (ref 130–400)
POTASSIUM SERPL-SCNC: 4.6 MEQ/L (ref 3.4–4.9)
PROT SERPL-MCNC: 7.5 G/DL (ref 6.3–8)
PROTHROMBIN TIME: 13.6 SEC (ref 12.3–14.9)
RBC # BLD AUTO: 5.32 M/UL (ref 4.7–6.1)
SODIUM SERPL-SCNC: 140 MEQ/L (ref 135–144)
WBC # BLD AUTO: 5.4 K/UL (ref 4.8–10.8)

## 2024-06-21 PROCEDURE — 0QS806Z REPOSITION RIGHT FEMORAL SHAFT WITH INTRAMEDULLARY INTERNAL FIXATION DEVICE, OPEN APPROACH: ICD-10-PCS | Performed by: ORTHOPAEDIC SURGERY

## 2024-06-21 PROCEDURE — 86850 RBC ANTIBODY SCREEN: CPT

## 2024-06-21 PROCEDURE — 99223 1ST HOSP IP/OBS HIGH 75: CPT | Performed by: ORTHOPAEDIC SURGERY

## 2024-06-21 PROCEDURE — 73552 X-RAY EXAM OF FEMUR 2/>: CPT

## 2024-06-21 PROCEDURE — 2580000003 HC RX 258: Performed by: NURSE PRACTITIONER

## 2024-06-21 PROCEDURE — 99285 EMERGENCY DEPT VISIT HI MDM: CPT

## 2024-06-21 PROCEDURE — 27506 TREATMENT OF THIGH FRACTURE: CPT | Performed by: ORTHOPAEDIC SURGERY

## 2024-06-21 PROCEDURE — 36415 COLL VENOUS BLD VENIPUNCTURE: CPT

## 2024-06-21 PROCEDURE — 86901 BLOOD TYPING SEROLOGIC RH(D): CPT

## 2024-06-21 PROCEDURE — 80053 COMPREHEN METABOLIC PANEL: CPT

## 2024-06-21 PROCEDURE — 76376 3D RENDER W/INTRP POSTPROCES: CPT

## 2024-06-21 PROCEDURE — 27506 TREATMENT OF THIGH FRACTURE: CPT | Performed by: PHYSICIAN ASSISTANT

## 2024-06-21 PROCEDURE — 7100000001 HC PACU RECOVERY - ADDTL 15 MIN: Performed by: ORTHOPAEDIC SURGERY

## 2024-06-21 PROCEDURE — 96374 THER/PROPH/DIAG INJ IV PUSH: CPT

## 2024-06-21 PROCEDURE — 96375 TX/PRO/DX INJ NEW DRUG ADDON: CPT

## 2024-06-21 PROCEDURE — 2709999900 HC NON-CHARGEABLE SUPPLY: Performed by: ORTHOPAEDIC SURGERY

## 2024-06-21 PROCEDURE — 3600000014 HC SURGERY LEVEL 4 ADDTL 15MIN: Performed by: ORTHOPAEDIC SURGERY

## 2024-06-21 PROCEDURE — 71045 X-RAY EXAM CHEST 1 VIEW: CPT

## 2024-06-21 PROCEDURE — 85025 COMPLETE CBC W/AUTO DIFF WBC: CPT

## 2024-06-21 PROCEDURE — C1889 IMPLANT/INSERT DEVICE, NOC: HCPCS | Performed by: ORTHOPAEDIC SURGERY

## 2024-06-21 PROCEDURE — 85610 PROTHROMBIN TIME: CPT

## 2024-06-21 PROCEDURE — 27781 TREATMENT OF FIBULA FRACTURE: CPT | Performed by: ORTHOPAEDIC SURGERY

## 2024-06-21 PROCEDURE — 2580000003 HC RX 258: Performed by: PHYSICIAN ASSISTANT

## 2024-06-21 PROCEDURE — 6370000000 HC RX 637 (ALT 250 FOR IP): Performed by: PHYSICIAN ASSISTANT

## 2024-06-21 PROCEDURE — 2580000003 HC RX 258: Performed by: ORTHOPAEDIC SURGERY

## 2024-06-21 PROCEDURE — 94150 VITAL CAPACITY TEST: CPT

## 2024-06-21 PROCEDURE — 6360000002 HC RX W HCPCS: Performed by: REGISTERED NURSE

## 2024-06-21 PROCEDURE — 2580000003 HC RX 258: Performed by: ANESTHESIOLOGY

## 2024-06-21 PROCEDURE — 3600000004 HC SURGERY LEVEL 4 BASE: Performed by: ORTHOPAEDIC SURGERY

## 2024-06-21 PROCEDURE — 2580000003 HC RX 258: Performed by: EMERGENCY MEDICINE

## 2024-06-21 PROCEDURE — C1769 GUIDE WIRE: HCPCS | Performed by: ORTHOPAEDIC SURGERY

## 2024-06-21 PROCEDURE — 93005 ELECTROCARDIOGRAM TRACING: CPT | Performed by: NURSE PRACTITIONER

## 2024-06-21 PROCEDURE — 2720000010 HC SURG SUPPLY STERILE: Performed by: ORTHOPAEDIC SURGERY

## 2024-06-21 PROCEDURE — 2500000003 HC RX 250 WO HCPCS: Performed by: ANESTHESIOLOGY

## 2024-06-21 PROCEDURE — 1210000000 HC MED SURG R&B

## 2024-06-21 PROCEDURE — 2500000003 HC RX 250 WO HCPCS: Performed by: REGISTERED NURSE

## 2024-06-21 PROCEDURE — 73700 CT LOWER EXTREMITY W/O DYE: CPT

## 2024-06-21 PROCEDURE — 6360000002 HC RX W HCPCS: Performed by: NURSE PRACTITIONER

## 2024-06-21 PROCEDURE — C1713 ANCHOR/SCREW BN/BN,TIS/BN: HCPCS | Performed by: ORTHOPAEDIC SURGERY

## 2024-06-21 PROCEDURE — 64447 NJX AA&/STRD FEMORAL NRV IMG: CPT | Performed by: ANESTHESIOLOGY

## 2024-06-21 PROCEDURE — 86900 BLOOD TYPING SEROLOGIC ABO: CPT

## 2024-06-21 PROCEDURE — 7100000000 HC PACU RECOVERY - FIRST 15 MIN: Performed by: ORTHOPAEDIC SURGERY

## 2024-06-21 PROCEDURE — 6360000002 HC RX W HCPCS: Performed by: EMERGENCY MEDICINE

## 2024-06-21 PROCEDURE — 6370000000 HC RX 637 (ALT 250 FOR IP): Performed by: EMERGENCY MEDICINE

## 2024-06-21 PROCEDURE — 3700000000 HC ANESTHESIA ATTENDED CARE: Performed by: ORTHOPAEDIC SURGERY

## 2024-06-21 PROCEDURE — 6360000002 HC RX W HCPCS: Performed by: ANESTHESIOLOGY

## 2024-06-21 PROCEDURE — 6360000002 HC RX W HCPCS: Performed by: PHYSICIAN ASSISTANT

## 2024-06-21 PROCEDURE — 3700000001 HC ADD 15 MINUTES (ANESTHESIA): Performed by: ORTHOPAEDIC SURGERY

## 2024-06-21 DEVICE — RFNA / 12MM / 400MM STANDARD BEND / STERILE: Type: IMPLANTABLE DEVICE | Site: FEMUR | Status: FUNCTIONAL

## 2024-06-21 DEVICE — LOW PROF LCKNG SCREW F/IM NAIL Ø 5.0MM / L 100MM/ XL25/ STER: Type: IMPLANTABLE DEVICE | Site: FEMUR | Status: FUNCTIONAL

## 2024-06-21 DEVICE — LOW PROF LCKNG SCREW F/IM NAIL Ø 5.0MM / L 90MM/ XL25/ STER: Type: IMPLANTABLE DEVICE | Site: FEMUR | Status: FUNCTIONAL

## 2024-06-21 DEVICE — LOCKING SCREW FOR IM NAIL Ø 5MM/ 40MM/ XL25/ STERILE: Type: IMPLANTABLE DEVICE | Site: FEMUR | Status: FUNCTIONAL

## 2024-06-21 DEVICE — LOW PROF LCKNG SCREW F/IM NAIL Ø 5.0MM / L 72MM / XL25/ STER: Type: IMPLANTABLE DEVICE | Site: FEMUR | Status: FUNCTIONAL

## 2024-06-21 DEVICE — END CAP FOR RFNA / 0MM STERILE: Type: IMPLANTABLE DEVICE | Site: FEMUR | Status: FUNCTIONAL

## 2024-06-21 RX ORDER — OXYCODONE HYDROCHLORIDE 5 MG/1
10 TABLET ORAL EVERY 4 HOURS PRN
Status: DISCONTINUED | OUTPATIENT
Start: 2024-06-21 | End: 2024-06-22 | Stop reason: HOSPADM

## 2024-06-21 RX ORDER — POTASSIUM CHLORIDE 7.45 MG/ML
10 INJECTION INTRAVENOUS PRN
Status: DISCONTINUED | OUTPATIENT
Start: 2024-06-21 | End: 2024-06-22 | Stop reason: HOSPADM

## 2024-06-21 RX ORDER — MAGNESIUM HYDROXIDE 1200 MG/15ML
LIQUID ORAL CONTINUOUS PRN
Status: COMPLETED | OUTPATIENT
Start: 2024-06-21 | End: 2024-06-21

## 2024-06-21 RX ORDER — POLYETHYLENE GLYCOL 3350 17 G/17G
17 POWDER, FOR SOLUTION ORAL DAILY
Status: DISCONTINUED | OUTPATIENT
Start: 2024-06-21 | End: 2024-06-22 | Stop reason: HOSPADM

## 2024-06-21 RX ORDER — SODIUM CHLORIDE, SODIUM LACTATE, POTASSIUM CHLORIDE, CALCIUM CHLORIDE 600; 310; 30; 20 MG/100ML; MG/100ML; MG/100ML; MG/100ML
INJECTION, SOLUTION INTRAVENOUS CONTINUOUS
Status: DISCONTINUED | OUTPATIENT
Start: 2024-06-21 | End: 2024-06-21 | Stop reason: HOSPADM

## 2024-06-21 RX ORDER — SODIUM CHLORIDE 0.9 % (FLUSH) 0.9 %
5-40 SYRINGE (ML) INJECTION EVERY 12 HOURS SCHEDULED
Status: DISCONTINUED | OUTPATIENT
Start: 2024-06-21 | End: 2024-06-22 | Stop reason: HOSPADM

## 2024-06-21 RX ORDER — SODIUM CHLORIDE 9 MG/ML
INJECTION, SOLUTION INTRAVENOUS PRN
Status: DISCONTINUED | OUTPATIENT
Start: 2024-06-21 | End: 2024-06-21 | Stop reason: HOSPADM

## 2024-06-21 RX ORDER — FENTANYL CITRATE 0.05 MG/ML
50 INJECTION, SOLUTION INTRAMUSCULAR; INTRAVENOUS EVERY 10 MIN PRN
Status: DISCONTINUED | OUTPATIENT
Start: 2024-06-21 | End: 2024-06-21 | Stop reason: HOSPADM

## 2024-06-21 RX ORDER — METHOCARBAMOL 100 MG/ML
1000 INJECTION, SOLUTION INTRAMUSCULAR; INTRAVENOUS ONCE
Status: COMPLETED | OUTPATIENT
Start: 2024-06-21 | End: 2024-06-21

## 2024-06-21 RX ORDER — ONDANSETRON 4 MG/1
4 TABLET, ORALLY DISINTEGRATING ORAL EVERY 8 HOURS PRN
Status: DISCONTINUED | OUTPATIENT
Start: 2024-06-21 | End: 2024-06-22 | Stop reason: HOSPADM

## 2024-06-21 RX ORDER — MAGNESIUM SULFATE IN WATER 40 MG/ML
2000 INJECTION, SOLUTION INTRAVENOUS PRN
Status: DISCONTINUED | OUTPATIENT
Start: 2024-06-21 | End: 2024-06-22 | Stop reason: HOSPADM

## 2024-06-21 RX ORDER — ROCURONIUM BROMIDE 10 MG/ML
INJECTION, SOLUTION INTRAVENOUS PRN
Status: DISCONTINUED | OUTPATIENT
Start: 2024-06-21 | End: 2024-06-21 | Stop reason: SDUPTHER

## 2024-06-21 RX ORDER — ONDANSETRON 2 MG/ML
4 INJECTION INTRAMUSCULAR; INTRAVENOUS EVERY 6 HOURS PRN
Status: DISCONTINUED | OUTPATIENT
Start: 2024-06-21 | End: 2024-06-22 | Stop reason: HOSPADM

## 2024-06-21 RX ORDER — POTASSIUM CHLORIDE 29.8 MG/ML
20 INJECTION INTRAVENOUS PRN
Status: DISCONTINUED | OUTPATIENT
Start: 2024-06-21 | End: 2024-06-22 | Stop reason: HOSPADM

## 2024-06-21 RX ORDER — ENOXAPARIN SODIUM 100 MG/ML
30 INJECTION SUBCUTANEOUS 2 TIMES DAILY
Status: DISCONTINUED | OUTPATIENT
Start: 2024-06-22 | End: 2024-06-22 | Stop reason: HOSPADM

## 2024-06-21 RX ORDER — OXYCODONE HYDROCHLORIDE 5 MG/1
10 TABLET ORAL ONCE
Status: COMPLETED | OUTPATIENT
Start: 2024-06-21 | End: 2024-06-21

## 2024-06-21 RX ORDER — LIDOCAINE 4 G/G
1 PATCH TOPICAL EVERY 12 HOURS
Status: DISCONTINUED | OUTPATIENT
Start: 2024-06-21 | End: 2024-06-22 | Stop reason: HOSPADM

## 2024-06-21 RX ORDER — SODIUM CHLORIDE 0.9 % (FLUSH) 0.9 %
5-40 SYRINGE (ML) INJECTION EVERY 12 HOURS SCHEDULED
Status: DISCONTINUED | OUTPATIENT
Start: 2024-06-21 | End: 2024-06-21 | Stop reason: HOSPADM

## 2024-06-21 RX ORDER — MIDAZOLAM HYDROCHLORIDE 1 MG/ML
INJECTION INTRAMUSCULAR; INTRAVENOUS PRN
Status: DISCONTINUED | OUTPATIENT
Start: 2024-06-21 | End: 2024-06-21 | Stop reason: SDUPTHER

## 2024-06-21 RX ORDER — OXYCODONE HYDROCHLORIDE 5 MG/1
5 TABLET ORAL
Status: DISCONTINUED | OUTPATIENT
Start: 2024-06-21 | End: 2024-06-21 | Stop reason: HOSPADM

## 2024-06-21 RX ORDER — BISACODYL 10 MG
10 SUPPOSITORY, RECTAL RECTAL DAILY PRN
Status: DISCONTINUED | OUTPATIENT
Start: 2024-06-21 | End: 2024-06-22 | Stop reason: HOSPADM

## 2024-06-21 RX ORDER — KETOROLAC TROMETHAMINE 15 MG/ML
15 INJECTION, SOLUTION INTRAMUSCULAR; INTRAVENOUS EVERY 6 HOURS
Status: DISCONTINUED | OUTPATIENT
Start: 2024-06-21 | End: 2024-06-22 | Stop reason: HOSPADM

## 2024-06-21 RX ORDER — METOCLOPRAMIDE HYDROCHLORIDE 5 MG/ML
10 INJECTION INTRAMUSCULAR; INTRAVENOUS
Status: DISCONTINUED | OUTPATIENT
Start: 2024-06-21 | End: 2024-06-21 | Stop reason: HOSPADM

## 2024-06-21 RX ORDER — LABETALOL HYDROCHLORIDE 5 MG/ML
10 INJECTION, SOLUTION INTRAVENOUS EVERY 6 HOURS PRN
Status: DISCONTINUED | OUTPATIENT
Start: 2024-06-21 | End: 2024-06-22 | Stop reason: HOSPADM

## 2024-06-21 RX ORDER — SODIUM CHLORIDE 0.9 % (FLUSH) 0.9 %
5-40 SYRINGE (ML) INJECTION PRN
Status: DISCONTINUED | OUTPATIENT
Start: 2024-06-21 | End: 2024-06-22 | Stop reason: HOSPADM

## 2024-06-21 RX ORDER — DEXAMETHASONE SODIUM PHOSPHATE 10 MG/ML
INJECTION INTRAMUSCULAR; INTRAVENOUS PRN
Status: DISCONTINUED | OUTPATIENT
Start: 2024-06-21 | End: 2024-06-21 | Stop reason: SDUPTHER

## 2024-06-21 RX ORDER — SODIUM CHLORIDE 9 MG/ML
INJECTION, SOLUTION INTRAVENOUS PRN
Status: DISCONTINUED | OUTPATIENT
Start: 2024-06-21 | End: 2024-06-22 | Stop reason: HOSPADM

## 2024-06-21 RX ORDER — OXYCODONE HYDROCHLORIDE 5 MG/1
5 TABLET ORAL EVERY 4 HOURS PRN
Status: DISCONTINUED | OUTPATIENT
Start: 2024-06-21 | End: 2024-06-22 | Stop reason: HOSPADM

## 2024-06-21 RX ORDER — NALOXONE HYDROCHLORIDE 0.4 MG/ML
INJECTION, SOLUTION INTRAMUSCULAR; INTRAVENOUS; SUBCUTANEOUS PRN
Status: DISCONTINUED | OUTPATIENT
Start: 2024-06-21 | End: 2024-06-21 | Stop reason: HOSPADM

## 2024-06-21 RX ORDER — ONDANSETRON 2 MG/ML
4 INJECTION INTRAMUSCULAR; INTRAVENOUS
Status: DISCONTINUED | OUTPATIENT
Start: 2024-06-21 | End: 2024-06-21 | Stop reason: HOSPADM

## 2024-06-21 RX ORDER — FENTANYL CITRATE 0.05 MG/ML
75 INJECTION, SOLUTION INTRAMUSCULAR; INTRAVENOUS ONCE
Status: COMPLETED | OUTPATIENT
Start: 2024-06-21 | End: 2024-06-21

## 2024-06-21 RX ORDER — 0.9 % SODIUM CHLORIDE 0.9 %
1000 INTRAVENOUS SOLUTION INTRAVENOUS ONCE
Status: COMPLETED | OUTPATIENT
Start: 2024-06-21 | End: 2024-06-21

## 2024-06-21 RX ORDER — ATORVASTATIN CALCIUM 20 MG/1
20 TABLET, FILM COATED ORAL DAILY
Status: DISCONTINUED | OUTPATIENT
Start: 2024-06-21 | End: 2024-06-22 | Stop reason: HOSPADM

## 2024-06-21 RX ORDER — AMLODIPINE BESYLATE 10 MG/1
10 TABLET ORAL DAILY
Status: DISCONTINUED | OUTPATIENT
Start: 2024-06-21 | End: 2024-06-22 | Stop reason: HOSPADM

## 2024-06-21 RX ORDER — ROPIVACAINE HYDROCHLORIDE 5 MG/ML
INJECTION, SOLUTION EPIDURAL; INFILTRATION; PERINEURAL
Status: COMPLETED | OUTPATIENT
Start: 2024-06-21 | End: 2024-06-21

## 2024-06-21 RX ORDER — SODIUM CHLORIDE, SODIUM LACTATE, POTASSIUM CHLORIDE, CALCIUM CHLORIDE 600; 310; 30; 20 MG/100ML; MG/100ML; MG/100ML; MG/100ML
INJECTION, SOLUTION INTRAVENOUS CONTINUOUS
Status: DISCONTINUED | OUTPATIENT
Start: 2024-06-21 | End: 2024-06-22 | Stop reason: HOSPADM

## 2024-06-21 RX ORDER — METHOCARBAMOL 500 MG/1
500 TABLET, FILM COATED ORAL EVERY 6 HOURS
Status: DISCONTINUED | OUTPATIENT
Start: 2024-06-21 | End: 2024-06-22 | Stop reason: HOSPADM

## 2024-06-21 RX ORDER — ACETAMINOPHEN 325 MG/1
650 TABLET ORAL EVERY 6 HOURS
Status: DISCONTINUED | OUTPATIENT
Start: 2024-06-21 | End: 2024-06-22 | Stop reason: HOSPADM

## 2024-06-21 RX ORDER — ONDANSETRON 2 MG/ML
4 INJECTION INTRAMUSCULAR; INTRAVENOUS ONCE
Status: COMPLETED | OUTPATIENT
Start: 2024-06-21 | End: 2024-06-21

## 2024-06-21 RX ORDER — MEPERIDINE HYDROCHLORIDE 25 MG/ML
12.5 INJECTION INTRAMUSCULAR; INTRAVENOUS; SUBCUTANEOUS
Status: DISCONTINUED | OUTPATIENT
Start: 2024-06-21 | End: 2024-06-21 | Stop reason: HOSPADM

## 2024-06-21 RX ORDER — DIPHENHYDRAMINE HYDROCHLORIDE 50 MG/ML
12.5 INJECTION INTRAMUSCULAR; INTRAVENOUS
Status: DISCONTINUED | OUTPATIENT
Start: 2024-06-21 | End: 2024-06-21 | Stop reason: HOSPADM

## 2024-06-21 RX ORDER — ONDANSETRON 2 MG/ML
INJECTION INTRAMUSCULAR; INTRAVENOUS PRN
Status: DISCONTINUED | OUTPATIENT
Start: 2024-06-21 | End: 2024-06-21 | Stop reason: SDUPTHER

## 2024-06-21 RX ORDER — SODIUM CHLORIDE 0.9 % (FLUSH) 0.9 %
5-40 SYRINGE (ML) INJECTION PRN
Status: DISCONTINUED | OUTPATIENT
Start: 2024-06-21 | End: 2024-06-21 | Stop reason: HOSPADM

## 2024-06-21 RX ORDER — PROPOFOL 10 MG/ML
INJECTION, EMULSION INTRAVENOUS PRN
Status: DISCONTINUED | OUTPATIENT
Start: 2024-06-21 | End: 2024-06-21 | Stop reason: SDUPTHER

## 2024-06-21 RX ADMIN — SODIUM CHLORIDE, PRESERVATIVE FREE 10 ML: 5 INJECTION INTRAVENOUS at 20:00

## 2024-06-21 RX ADMIN — METHOCARBAMOL 1000 MG: 100 INJECTION INTRAMUSCULAR; INTRAVENOUS at 11:22

## 2024-06-21 RX ADMIN — ROCURONIUM BROMIDE 50 MG: 10 INJECTION, SOLUTION INTRAVENOUS at 16:16

## 2024-06-21 RX ADMIN — PHENYLEPHRINE HYDROCHLORIDE 100 MCG: 10 INJECTION INTRAVENOUS at 17:08

## 2024-06-21 RX ADMIN — SUGAMMADEX 200 MG: 100 INJECTION, SOLUTION INTRAVENOUS at 17:38

## 2024-06-21 RX ADMIN — METHOCARBAMOL 500 MG: 500 TABLET ORAL at 19:59

## 2024-06-21 RX ADMIN — PROPOFOL 50 MCG/KG/MIN: 10 INJECTION, EMULSION INTRAVENOUS at 17:27

## 2024-06-21 RX ADMIN — ONDANSETRON 4 MG: 2 INJECTION INTRAMUSCULAR; INTRAVENOUS at 10:25

## 2024-06-21 RX ADMIN — PHENYLEPHRINE HYDROCHLORIDE 50 MCG: 10 INJECTION INTRAVENOUS at 15:53

## 2024-06-21 RX ADMIN — DEXAMETHASONE SODIUM PHOSPHATE 8 MG: 10 INJECTION INTRAMUSCULAR; INTRAVENOUS at 16:33

## 2024-06-21 RX ADMIN — OXYCODONE 10 MG: 5 TABLET ORAL at 11:19

## 2024-06-21 RX ADMIN — ROPIVACAINE HYDROCHLORIDE 20 ML: 5 INJECTION, SOLUTION EPIDURAL; INFILTRATION; PERINEURAL at 14:20

## 2024-06-21 RX ADMIN — FENTANYL CITRATE 75 MCG: 0.05 INJECTION, SOLUTION INTRAMUSCULAR; INTRAVENOUS at 10:12

## 2024-06-21 RX ADMIN — PROPOFOL 200 MG: 10 INJECTION, EMULSION INTRAVENOUS at 15:40

## 2024-06-21 RX ADMIN — KETOROLAC TROMETHAMINE 15 MG: 15 INJECTION, SOLUTION INTRAMUSCULAR; INTRAVENOUS at 19:59

## 2024-06-21 RX ADMIN — ATORVASTATIN CALCIUM 20 MG: 20 TABLET, FILM COATED ORAL at 19:59

## 2024-06-21 RX ADMIN — POLYETHYLENE GLYCOL 3350 17 G: 17 POWDER, FOR SOLUTION ORAL at 19:59

## 2024-06-21 RX ADMIN — ROCURONIUM BROMIDE 50 MG: 10 INJECTION, SOLUTION INTRAVENOUS at 15:40

## 2024-06-21 RX ADMIN — SODIUM CHLORIDE 1000 ML: 9 INJECTION, SOLUTION INTRAVENOUS at 11:22

## 2024-06-21 RX ADMIN — ACETAMINOPHEN 325MG 650 MG: 325 TABLET ORAL at 19:59

## 2024-06-21 RX ADMIN — PHENYLEPHRINE HYDROCHLORIDE 50 MCG: 10 INJECTION INTRAVENOUS at 16:07

## 2024-06-21 RX ADMIN — SODIUM CHLORIDE, POTASSIUM CHLORIDE, SODIUM LACTATE AND CALCIUM CHLORIDE: 600; 310; 30; 20 INJECTION, SOLUTION INTRAVENOUS at 15:33

## 2024-06-21 RX ADMIN — OXYCODONE HYDROCHLORIDE 5 MG: 5 TABLET ORAL at 21:50

## 2024-06-21 RX ADMIN — ONDANSETRON 4 MG: 2 INJECTION INTRAMUSCULAR; INTRAVENOUS at 17:25

## 2024-06-21 RX ADMIN — SODIUM CHLORIDE, POTASSIUM CHLORIDE, SODIUM LACTATE AND CALCIUM CHLORIDE: 600; 310; 30; 20 INJECTION, SOLUTION INTRAVENOUS at 20:06

## 2024-06-21 RX ADMIN — SODIUM CHLORIDE 3000 MG: 900 INJECTION INTRAVENOUS at 15:46

## 2024-06-21 RX ADMIN — SODIUM CHLORIDE, POTASSIUM CHLORIDE, SODIUM LACTATE AND CALCIUM CHLORIDE: 600; 310; 30; 20 INJECTION, SOLUTION INTRAVENOUS at 15:40

## 2024-06-21 RX ADMIN — MIDAZOLAM HYDROCHLORIDE 2 MG: 1 INJECTION, SOLUTION INTRAMUSCULAR; INTRAVENOUS at 14:19

## 2024-06-21 RX ADMIN — SODIUM CHLORIDE 3000 MG: 900 INJECTION INTRAVENOUS at 22:51

## 2024-06-21 RX ADMIN — SODIUM CHLORIDE, POTASSIUM CHLORIDE, SODIUM LACTATE AND CALCIUM CHLORIDE: 600; 310; 30; 20 INJECTION, SOLUTION INTRAVENOUS at 13:27

## 2024-06-21 RX ADMIN — AMLODIPINE BESYLATE 10 MG: 10 TABLET ORAL at 19:58

## 2024-06-21 RX ADMIN — METOPROLOL TARTRATE 100 MG: 25 TABLET, FILM COATED ORAL at 19:58

## 2024-06-21 ASSESSMENT — ENCOUNTER SYMPTOMS
EYE REDNESS: 0
VOMITING: 0
NAUSEA: 0
CHEST TIGHTNESS: 0
SHORTNESS OF BREATH: 0
COUGH: 0
WHEEZING: 0
PHOTOPHOBIA: 0
SORE THROAT: 0
EYE PAIN: 0
DIARRHEA: 0
BACK PAIN: 0
ABDOMINAL PAIN: 0

## 2024-06-21 ASSESSMENT — PAIN DESCRIPTION - PAIN TYPE: TYPE: ACUTE PAIN

## 2024-06-21 ASSESSMENT — PAIN DESCRIPTION - ORIENTATION
ORIENTATION: RIGHT

## 2024-06-21 ASSESSMENT — PAIN SCALES - GENERAL
PAINLEVEL_OUTOF10: 0
PAINLEVEL_OUTOF10: 5
PAINLEVEL_OUTOF10: 5
PAINLEVEL_OUTOF10: 3
PAINLEVEL_OUTOF10: 0
PAINLEVEL_OUTOF10: 10
PAINLEVEL_OUTOF10: 0
PAINLEVEL_OUTOF10: 5

## 2024-06-21 ASSESSMENT — LIFESTYLE VARIABLES
HOW MANY STANDARD DRINKS CONTAINING ALCOHOL DO YOU HAVE ON A TYPICAL DAY: PATIENT DOES NOT DRINK
HOW OFTEN DO YOU HAVE A DRINK CONTAINING ALCOHOL: NEVER
HOW OFTEN DO YOU HAVE A DRINK CONTAINING ALCOHOL: NEVER
HOW MANY STANDARD DRINKS CONTAINING ALCOHOL DO YOU HAVE ON A TYPICAL DAY: PATIENT DOES NOT DRINK

## 2024-06-21 ASSESSMENT — PAIN DESCRIPTION - LOCATION
LOCATION: LEG

## 2024-06-21 ASSESSMENT — PAIN DESCRIPTION - DESCRIPTORS
DESCRIPTORS: ACHING;SORE
DESCRIPTORS: ACHING;SORE

## 2024-06-21 ASSESSMENT — PAIN - FUNCTIONAL ASSESSMENT: PAIN_FUNCTIONAL_ASSESSMENT: 0-10

## 2024-06-21 NOTE — CONSULTS
Consult Note  Patient: Kane Gibbs  Unit/Bed: 04/04  YOB: 1955  MRN: [id number]  Acct: [id number]   Admitting Diagnosis: Right femur fracture    Date:  6/21/2024  Hospital Day: 0    Complaint:  Slip and fall  Inability to ambulate     History of Present Illness:  Kane Gibbs is a 68 y.o. male who presents to the emergency department  after slipping on a wet floor at work.  He states that he felt a pop in his right thigh and is concerned that he broke his femur.  He did not hit his head and has no pain to the neck.  Last ate at 6 AM Is not on any blood thinners but does take blood pressure medication and a statin.   He denies any dizziness or any there symptoms prior to the fall       PMHx:  Past Medical History:   Diagnosis Date    Hypertension        PSHx:  History reviewed. No pertinent surgical history.    Social Hx:  Social History     Socioeconomic History    Marital status:      Spouse name: None    Number of children: None    Years of education: None    Highest education level: None   Tobacco Use    Smoking status: Never    Smokeless tobacco: Never     Social Determinants of Health     Financial Resource Strain: Low Risk  (6/18/2024)    Overall Financial Resource Strain (CARDIA)     Difficulty of Paying Living Expenses: Not hard at all   Food Insecurity: No Food Insecurity (6/18/2024)    Hunger Vital Sign     Worried About Running Out of Food in the Last Year: Never true     Ran Out of Food in the Last Year: Never true   Transportation Needs: Unknown (6/18/2024)    PRAPARE - Transportation     Lack of Transportation (Non-Medical): No   Physical Activity: Sufficiently Active (6/18/2024)    Exercise Vital Sign     Days of Exercise per Week: 7 days     Minutes of Exercise per Session: 60 min   Housing Stability: Unknown (6/18/2024)    Housing Stability Vital Sign     Unstable Housing in the Last Year: No       Family Hx:  Family History   Problem Relation Age of Onset    High Blood  admission    Plan for OR later today for retrograde nailing.  The procedure was explained along the risks, benefits and alternatives being reviewed.  Questions were answered.  Symptomatic treatment fibula fracture     Patient seen and examined with CNP.  I agree with the assessment and treatment plan.      Electronically signed by MT Espinoza CNP on 6/21/2024 at 11:25 AM

## 2024-06-21 NOTE — TELEPHONE ENCOUNTER
Location of patient: [place]    Location of employment: [employer]    Department where injury occurred: OR    Location of injury (body part involved): right leg    Time of injury: 06/21/2024 9:15 am    Last 4 of SSN: [id number]    Location associate referred to for care: Emergency Dept     Subjective: Caller states \"Floor was wet (mopping) and he fell hurting the top of his right knee\"     Current Symptoms: \"heard a pop\", external relocation, right knee, cannot walk, pain    Pain Severity: 10/10 (with any movement); aching; constant    Temperature: denies       What has been tried: Nothing - rapid response team has taken him to the ED    LMP: NA Pregnant: NA    Recommended disposition: Call  Now    **Manager notified; safecare report being completed now      Care advice provided, caller verbalizes understanding; denies any other questions or concerns.    Patient is currently being evaluated in the ED    Reason for Disposition   Looks like a dislocated joint (crooked or deformed)    Protocols used: Leg Injury-ADULT-[place]

## 2024-06-21 NOTE — ACP (ADVANCE CARE PLANNING)
Advance Care Planning     Advance Care Planning Activator (Inpatient)  Conversation Note      Date of ACP Conversation: 6/21/2024     Conversation Conducted with: Patient with Decision Making Capacity    ACP Activator: Shani Romero, RN        Health Care Decision Maker:     Current Designated Health Care Decision Maker:     Primary Decision Maker: Manuelito Gibbs - Brother/Sister - [phone]    Secondary Decision Maker: Jessica Harrington - Child - [phone]

## 2024-06-21 NOTE — PROGRESS NOTES
DVT / VTE PROPHYLAXIS EVALUATION    Recent Labs     06/21/24  1000   BUN 19   CREATININE 0.96      HGB 15.4   HCT 46.0   INR 1.0     ADMITTING DX OR CHIEF COMPLAINT? fracture  WARFARIN? DOAC'S? no  ANY APPARENT BLEEDING? no  SCHEDULED SURGERY? Post op     If yes to following, excluded from auto adjustment in Table 1 of policy - please contact provider with recommendations as appropriate.  Include condition/exception in scratch notes. Yes No   Trauma Service or Ortho Surgery [x]  []    COVID []  []    Pregnancy []  []        Current order:  Enoxaparin 30 mg SUBQ twice daily      Height: 182.9 cm (6'), Weight - Scale: 136.1 kg (300 lb)  Estimated Creatinine Clearance: 105 mL/min (based on SCr of 0.96 mg/dL).    Plan:  No intervention recommended, continue current VTE prophylaxis as ordered     Patient Weight (kg)      50.9 and below .9 101-150.9 151-174.9 175 or greater   Estimated   CrCl  (ml/min) 30 or greater []   30 mg   SUBQ daily   []   40 mg   SUBQ daily (or 30 mg BID for orthopedic cases) [x]  30 mg SUBQ   BID  []  40 mg   SUBQ   BID []  60mg SUBQ BID    15-29.9 []  UFH 5000   units SUBQ BID []  30 mg   SUBQ daily [] 30 mg SUBQ   daily []  40 mg SUBQ   daily [] 60 mg SUBQ   daily    Less than 15 or dialysis []  UFH 5000   units SUBQ BID [] UFH 5000 units SUBQ TID []  UFH 7500   units   SUBQ TID         [place], [facility] PharmD

## 2024-06-21 NOTE — ED NOTES
Pt changed into hospital gown,   Belongings placed in belongings bag  Socks and undergarments thrown away per pts request

## 2024-06-21 NOTE — ANESTHESIA PRE PROCEDURE
Department of Anesthesiology  Preprocedure Note       Name:  Kane Gibbs   Age:  68 y.o.  :  1955                                          MRN:  [id number]         Date:  2024      Surgeon: Surgeon(s):  Gilmar Weiss MD    Procedure: Procedure(s):  RIGHT FEMUR INTRAMEDULLARY NAIL RETROGRADE.    Medications prior to admission:   Prior to Admission medications    Medication Sig Start Date End Date Taking? Authorizing Provider   metoprolol (LOPRESSOR) 100 MG tablet TAKE 1 TABLET BY MOUTH  TWICE DAILY 23   Christiano Sapp MD   meloxicam (MOBIC) 15 MG tablet TAKE 1 TABLET BY MOUTH ONCE  DAILY 23   Christiano Sapp MD   amLODIPine (NORVASC) 10 MG tablet TAKE 1 TABLET BY MOUTH DAILY 23   Christiano Sapp MD   atorvastatin (LIPITOR) 20 MG tablet TAKE 1 TABLET BY MOUTH ONCE  DAILY 23   Christiano Sapp MD   fexofenadine (ALLEGRA) 180 MG tablet Take 1 tablet by mouth daily 21   Christiano Sapp MD       Current medications:    Current Facility-Administered Medications   Medication Dose Route Frequency Provider Last Rate Last Admin   • ceFAZolin Sodium (ANCEF) 3,000 mg in sodium chloride 0.9 % 100 mL (mini-bag)  3,000 mg IntraVENous On Call to OR Lenore Ramírez, APRN - CNP       • labetalol (NORMODYNE;TRANDATE) injection 10 mg  10 mg IntraVENous Q6H PRN Janel Carpio PA       • lactated ringers IV soln infusion   IntraVENous Continuous Amairani Stout  mL/hr at 24 1335 NoRateChange at 24 1335   • sod chloride IRR soln 0.9 % irrigation    Continuous PRN Gilmar Weiss MD   3,000 mL at 24 1348       Allergies:  No Known Allergies    Problem List:    Patient Active Problem List   Diagnosis Code   • Hypertension I10   • Primary osteoarthritis of right knee M17.11   • Closed fracture of distal end of right femur, unspecified fracture morphology, initial encounter ([facility]) S72.401A       Past Medical History:        Diagnosis Date   • Hypertension        Past Surgical

## 2024-06-21 NOTE — CARE COORDINATION
Case Management Assessment  Initial Evaluation    Date/Time of Evaluation: 6/21/2024 1:54 PM  Assessment Completed by: Shani Romero RN    If patient is discharged prior to next notation, then this note serves as note for discharge by case management.    Patient Name: Kane Gibbs                   YOB: 1955  Diagnosis: Closed fracture of distal end of right femur, unspecified fracture morphology, initial encounter ([facility]) [S72.401A]                   Date / Time: 6/21/2024  9:54 AM    Patient Admission Status: Inpatient   Readmission Risk (Low < 19, Mod (19-27), High > 27): Readmission Risk Score: 6.1    Current PCP: Christiano Sapp MD  PCP verified by CM? (P) Yes    Chart Reviewed: Yes      History Provided by: (P) Patient  Patient Orientation: (P) Alert and Oriented, Person, Place, Situation, Self    Patient Cognition: (P) Alert    Hospitalization in the last 30 days (Readmission):  No    If yes, Readmission Assessment in  Navigator will be completed.    Advance Directives:      Code Status: Full Code   Patient's Primary Decision Maker is: (P) Legal Next of Kin (brother Manuelito, daughter Jessica.)    Primary Decision Maker: Manuelito Gibbs - Brother/Sister - [phone]    Discharge Planning:    Patient lives with: (P) Alone Type of Home: (P) House  Primary Care Giver: (P) Self  Patient Support Systems include: (P) Family Members, Children   Current Financial resources: (P) Medicare  Current community resources:    Current services prior to admission: (P) None            Current DME:              Type of Home Care services:       ADLS  Prior functional level: (P) Independent in ADLs/IADLs  Current functional level: (P) Independent in ADLs/IADLs    PT AM-PAC:   /24  OT AM-PAC:   /24    Family can provide assistance at DC: (P) Yes  Would you like Case Management to discuss the discharge plan with any other family members/significant others, and if so, who? (P) No  Plans to Return to Present Housing:

## 2024-06-21 NOTE — ANESTHESIA PROCEDURE NOTES
Peripheral Block    Patient location during procedure: pre-op  Reason for block: post-op pain management and at surgeon's request  Start time: 6/21/2024 2:20 PM  End time: 6/21/2024 2:25 PM  Staffing  Performed: anesthesiologist   Anesthesiologist: Amairani Stout MD  Performed by: Amairani Stout MD  Authorized by: Amairani Stout MD    Preanesthetic Checklist  Completed: patient identified, IV checked, site marked, risks and benefits discussed, surgical/procedural consents, equipment checked, pre-op evaluation, timeout performed, anesthesia consent given, oxygen available and monitors applied/VS acknowledged  Peripheral Block   Patient position: supine  Prep: ChloraPrep  Provider prep: mask and sterile gloves (Sterile probe cover)  Patient monitoring: cardiac monitor, continuous pulse ox, frequent blood pressure checks and IV access  Block type: Femoral  Laterality: right  Injection technique: single-shot  Guidance: ultrasound guided  Local infiltration: lidocaine  Infiltration strength: 1 %  Local infiltration: lidocaine  Dose: 1 mL    Needle   Needle type: combined needle/nerve stimulator   Needle gauge: 21 G  Needle localization: anatomical landmarks and ultrasound guidance  Needle length: 10 cm  Assessment   Injection assessment: negative aspiration for heme, no paresthesia on injection and local visualized surrounding nerve on ultrasound  Paresthesia pain: immediately resolved  Slow fractionated injection: yes  Hemodynamics: stable    Additional Notes  Ultrasound guidance used to view needle for placement.    Ultrasound image stored,  printed and saved in patient chart.    Sterile probe cover used    Medications Administered  ropivacaine (NAROPIN) injection 0.5% - Perineural, Thigh Right   20 mL - 6/21/2024 2:20:00 PM

## 2024-06-21 NOTE — OP NOTE
Operative Note      Patient: Kane Gibbs  YOB: 1955  MRN: [id number]    Date of Procedure: 6/21/2024    Pre-Op Diagnosis Codes:     * Closed fracture of distal end of right femur, unspecified fracture morphology, initial encounter ([facility]) [S72.401A]    Post-Op Diagnosis: Same       Procedure(s):  RIGHT FEMUR INTRAMEDULLARY NAIL RETROGRADE.    Surgeon(s):  Gilmar Weiss MD    Assistant:   Physician Assistant: Robert Fulton PA-C    Anesthesia: Choice    Estimated Blood Loss (mL): less than 100     Complications: None    Specimens:   * No specimens in log *    Implants:  Implant Name Type Inv. Item Serial No.  Lot No. LRB No. Used Action   NAIL RFNA 5 DEG BEND [id number] ST - [id number]  NAIL RFNA 5 DEG BEND [id number] ST  [employer] [id number] Right 1 Implanted   SCREW BNE LCK 5X90 MM LP TI STRL RFNADVANCED [id number] - [id number]  SCREW BNE LCK 5X90 MM LP TI STRL RFNADVANCED [id number]  [employer] [id number] Right 1 Implanted   SCREW BNE LCK 5X72 MM LP XL25 RETROGRADE STRL RFNADVANCED - [id number]  SCREW BNE LCK 5X72 MM LP XL25 RETROGRADE STRL RFNADVANCED  [employer] [id number] Right 1 Implanted   SCREW BNE LCK 5X100 MM LP TI STRL RFNADVANCED [id number] - [id number]  SCREW BNE LCK 5X100 MM LP TI STRL RFNADVANCED [id number]  [employer] [id number] Right 1 Implanted   SCREW LCK F/IM NAIL 5X40MM XL25 STR - [id number]  SCREW LCK F/IM NAIL 5X40MM XL25 STR  [employer] Li[employer] [id number] Right 1 Implanted   CAP END F/RFNA 0MM STERILE - [id number]  CAP END F/RFNA 0MM STERILE  Power Li[employer] [id number] Right 1 Implanted         Drains: * No LDAs found *    Findings:  Infection Present At Time Of Surgery (PATOS) (choose all levels that have infection present):  No infection present  Other Findings: Comminuted, displaced right femur fracture    Detailed Description of Procedure:   Indications:    This is a 68-year-old male who slipped in the operating room earlier today and  screw holes.  I measured and placed the screw.  I felt only 1 screw was indicated given the tight fit at the isthmus.    The collinear clamp was removed as was the outrigger partially.  I then placed a 0 mm end cap.  The remainder of the outrigger was then removed.  I checked C arm in the AP and lateral planes.  I was satisfied with the hardware as well as the reduction.  Attention was turned to closure.    The wounds were thoroughly copiously irrigated with pulsatile lavage.  #1 Vicryl was used to close the fascia on the thigh followed by Vicryl suture in the fatty tissues subcutaneous closure was with 2-0 Vicryl the skin was closed with staples.  The patellar tendon rent was repaired with #1 Vicryl.  The peritenon was repaired with 2-0 Vicryl subcutaneous closure was with 2-0 Vicryl the skin was closed with staples.  Sterile waterproof dressings were applied followed by an Ace.    The patient tolerated the procedure well.      Due to the complexity of the case the PAs assistance was critical in obtaining the exposure, assisting with the reduction, placement of the implants as well as closure.  No qualified resident was available.    Electronically signed by Gilmar Weiss MD on 6/21/2024 at 5:39 PM

## 2024-06-21 NOTE — ANESTHESIA POSTPROCEDURE EVALUATION
Department of Anesthesiology  Postprocedure Note    Patient: Kane Gibbs  MRN: [id number]  YOB: 1955  Date of evaluation: 6/21/2024    Procedure Summary       Date: 06/21/24 Room / Location: [address]    Anesthesia Start: 1533 Anesthesia Stop: 1757    Procedure: RIGHT FEMUR INTRAMEDULLARY NAIL RETROGRADE. (Right: Leg Lower) Diagnosis:       Closed fracture of distal end of right femur, unspecified fracture morphology, initial encounter ([facility])      (Closed fracture of distal end of right femur, unspecified fracture morphology, initial encounter ([facility]) [S72.401A])    Surgeons: Gilmar Weiss MD Responsible Provider: Amairani Stout MD    Anesthesia Type: general, regional ASA Status: 2 - Emergent            Anesthesia Type: No value filed.    Dede Phase I:      Dede Phase II:      Anesthesia Post Evaluation    Patient location during evaluation: PACU  Patient participation: complete - patient participated  Level of consciousness: awake and alert  Pain score: 0  Airway patency: patent  Nausea & Vomiting: no vomiting and no nausea  Cardiovascular status: hemodynamically stable  Respiratory status: face mask  Hydration status: stable  Multimodal analgesia pain management approach  Pain management: adequate    No notable events documented.

## 2024-06-21 NOTE — ED PROVIDER NOTES
[facility] ED  EMERGENCY DEPARTMENT ENCOUNTER      Pt Name: Kane Gibbs  MRN: [id number]  Birthdate 1955  Date of evaluation: 6/21/2024  Provider: Zari Caceres DO    CHIEF COMPLAINT       Chief Complaint   Patient presents with    Fall     Right leg injury          HISTORY OF PRESENT ILLNESS   (Location/Symptom, Timing/Onset, Context/Setting, Quality, Duration, Modifying Factors, Severity)  Note limiting factors.   Kane Gibbs is a 68 y.o. male who presents to the emergency department .  Patient presents after slipping on a wet floor at work.  He states that he felt a pop in his right thigh and is concerned that he broke his femur.  He did not hit his head and has no pain to the neck.  Last ate at 6 AM coffee only.  Is not on any blood thinners but does take blood pressure medication and a statin.    HPI    Nursing Notes were reviewed.    REVIEW OF SYSTEMS    (2-9 systems for level 4, 10 or more for level 5)     Review of Systems   Constitutional:  Negative for activity change, appetite change and fatigue.   HENT:  Negative for congestion and sore throat.    Eyes:  Negative for pain and visual disturbance.   Respiratory:  Negative for chest tightness and shortness of breath.    Cardiovascular:  Negative for chest pain.   Gastrointestinal:  Negative for abdominal pain, nausea and vomiting.   Endocrine: Negative for polydipsia.   Genitourinary:  Negative for flank pain and urgency.   Musculoskeletal:  Positive for arthralgias, gait problem and joint swelling. Negative for neck stiffness.   Skin:  Negative for rash.   Neurological:  Negative for weakness, light-headedness and headaches.   Psychiatric/Behavioral:  Negative for confusion and sleep disturbance.        Except as noted above the remainder of the review of systems was reviewed and negative.       PAST MEDICAL HISTORY     Past Medical History:   Diagnosis Date    Hypertension          SURGICAL HISTORY     History reviewed. No pertinent

## 2024-06-22 ENCOUNTER — HOSPITAL ENCOUNTER (INPATIENT)
Age: 69
LOS: 8 days | Discharge: HOME HEALTH | DRG: 560 | End: 2024-06-30
Attending: PHYSICAL MEDICINE & REHABILITATION | Admitting: PHYSICAL MEDICINE & REHABILITATION
Payer: MEDICARE

## 2024-06-22 VITALS
HEIGHT: 72 IN | RESPIRATION RATE: 18 BRPM | DIASTOLIC BLOOD PRESSURE: 80 MMHG | WEIGHT: 300 LBS | TEMPERATURE: 97.9 F | OXYGEN SATURATION: 96 % | SYSTOLIC BLOOD PRESSURE: 135 MMHG | HEART RATE: 82 BPM | BODY MASS INDEX: 40.63 KG/M2

## 2024-06-22 DIAGNOSIS — Z74.09 IMPAIRED MOBILITY AND ACTIVITIES OF DAILY LIVING: Primary | ICD-10-CM

## 2024-06-22 DIAGNOSIS — Z78.9 IMPAIRED MOBILITY AND ACTIVITIES OF DAILY LIVING: Primary | ICD-10-CM

## 2024-06-22 DIAGNOSIS — S82.831D OTHER CLOSED FRACTURE OF PROXIMAL END OF RIGHT FIBULA WITH ROUTINE HEALING, SUBSEQUENT ENCOUNTER: ICD-10-CM

## 2024-06-22 PROBLEM — M48.062 SPINAL STENOSIS OF LUMBAR REGION WITH NEUROGENIC CLAUDICATION: Status: ACTIVE | Noted: 2024-06-22

## 2024-06-22 PROBLEM — M54.9 EXACERBATION OF CHRONIC BACK PAIN: Status: ACTIVE | Noted: 2024-06-22

## 2024-06-22 PROBLEM — G89.29 EXACERBATION OF CHRONIC BACK PAIN: Status: ACTIVE | Noted: 2024-06-22

## 2024-06-22 PROBLEM — R73.9 HYPERGLYCEMIA: Status: ACTIVE | Noted: 2024-06-22

## 2024-06-22 PROBLEM — D64.9 POSTOPERATIVE ANEMIA: Status: ACTIVE | Noted: 2024-06-22

## 2024-06-22 PROBLEM — G89.18 POST-OP PAIN: Status: ACTIVE | Noted: 2024-06-22

## 2024-06-22 LAB
ANION GAP SERPL CALCULATED.3IONS-SCNC: 11 MEQ/L (ref 9–15)
BUN SERPL-MCNC: 20 MG/DL (ref 8–23)
CALCIUM SERPL-MCNC: 8.6 MG/DL (ref 8.5–9.9)
CHLORIDE SERPL-SCNC: 101 MEQ/L (ref 95–107)
CO2 SERPL-SCNC: 26 MEQ/L (ref 20–31)
CREAT SERPL-MCNC: 0.97 MG/DL (ref 0.7–1.2)
ERYTHROCYTE [DISTWIDTH] IN BLOOD BY AUTOMATED COUNT: 13 % (ref 11.5–14.5)
ETHANOL PERCENT: NORMAL G/DL
ETHANOLAMINE SERPL-MCNC: <10 MG/DL (ref 0–0.08)
GLUCOSE SERPL-MCNC: 157 MG/DL (ref 70–99)
HCT VFR BLD AUTO: 36.3 % (ref 42–52)
HGB BLD-MCNC: 12.3 G/DL (ref 14–18)
MAGNESIUM SERPL-MCNC: 1.7 MG/DL (ref 1.7–2.4)
MCH RBC QN AUTO: 29 PG (ref 27–31.3)
MCHC RBC AUTO-ENTMCNC: 33.9 % (ref 33–37)
MCV RBC AUTO: 85.6 FL (ref 79–92.2)
PLATELET # BLD AUTO: 217 K/UL (ref 130–400)
POTASSIUM SERPL-SCNC: 4.5 MEQ/L (ref 3.4–4.9)
RBC # BLD AUTO: 4.24 M/UL (ref 4.7–6.1)
SODIUM SERPL-SCNC: 138 MEQ/L (ref 135–144)
WBC # BLD AUTO: 7.1 K/UL (ref 4.8–10.8)

## 2024-06-22 PROCEDURE — 6360000002 HC RX W HCPCS: Performed by: PHYSICIAN ASSISTANT

## 2024-06-22 PROCEDURE — 83735 ASSAY OF MAGNESIUM: CPT

## 2024-06-22 PROCEDURE — 82077 ASSAY SPEC XCP UR&BREATH IA: CPT

## 2024-06-22 PROCEDURE — 1180000000 HC REHAB R&B

## 2024-06-22 PROCEDURE — 2580000003 HC RX 258: Performed by: NURSE PRACTITIONER

## 2024-06-22 PROCEDURE — 6370000000 HC RX 637 (ALT 250 FOR IP): Performed by: PHYSICIAN ASSISTANT

## 2024-06-22 PROCEDURE — 85027 COMPLETE CBC AUTOMATED: CPT

## 2024-06-22 PROCEDURE — 6360000002 HC RX W HCPCS: Performed by: NURSE PRACTITIONER

## 2024-06-22 PROCEDURE — 80048 BASIC METABOLIC PNL TOTAL CA: CPT

## 2024-06-22 PROCEDURE — 36415 COLL VENOUS BLD VENIPUNCTURE: CPT

## 2024-06-22 PROCEDURE — 2700000000 HC OXYGEN THERAPY PER DAY

## 2024-06-22 PROCEDURE — 2580000003 HC RX 258: Performed by: PHYSICIAN ASSISTANT

## 2024-06-22 PROCEDURE — 97162 PT EVAL MOD COMPLEX 30 MIN: CPT

## 2024-06-22 PROCEDURE — 97166 OT EVAL MOD COMPLEX 45 MIN: CPT

## 2024-06-22 RX ORDER — ATORVASTATIN CALCIUM 20 MG/1
20 TABLET, FILM COATED ORAL NIGHTLY
Status: DISCONTINUED | OUTPATIENT
Start: 2024-06-22 | End: 2024-06-30 | Stop reason: HOSPADM

## 2024-06-22 RX ORDER — POLYETHYLENE GLYCOL 3350 17 G/17G
17 POWDER, FOR SOLUTION ORAL DAILY
Qty: 30 PACKET | Refills: 0 | Status: SHIPPED | OUTPATIENT
Start: 2024-06-23 | End: 2024-07-23

## 2024-06-22 RX ORDER — ATORVASTATIN CALCIUM 20 MG/1
20 TABLET, FILM COATED ORAL DAILY
Status: CANCELLED | OUTPATIENT
Start: 2024-06-23

## 2024-06-22 RX ORDER — ATORVASTATIN CALCIUM 20 MG/1
20 TABLET, FILM COATED ORAL NIGHTLY
Status: CANCELLED | OUTPATIENT
Start: 2024-06-22

## 2024-06-22 RX ORDER — LIDOCAINE 4 G/G
1 PATCH TOPICAL EVERY 12 HOURS
Status: CANCELLED | OUTPATIENT
Start: 2024-06-22

## 2024-06-22 RX ORDER — LIDOCAINE 4 G/G
1 PATCH TOPICAL EVERY 12 HOURS
Status: DISCONTINUED | OUTPATIENT
Start: 2024-06-22 | End: 2024-06-24

## 2024-06-22 RX ORDER — ASPIRIN 81 MG/1
81 TABLET, CHEWABLE ORAL DAILY
Qty: 42 TABLET | Refills: 0 | Status: SHIPPED | OUTPATIENT
Start: 2024-06-22 | End: 2024-06-22

## 2024-06-22 RX ORDER — OXYCODONE HYDROCHLORIDE 5 MG/1
5 TABLET ORAL EVERY 4 HOURS PRN
Status: DISCONTINUED | OUTPATIENT
Start: 2024-06-22 | End: 2024-06-30 | Stop reason: HOSPADM

## 2024-06-22 RX ORDER — ACETAMINOPHEN 325 MG/1
650 TABLET ORAL EVERY 6 HOURS
Qty: 112 TABLET | Refills: 0 | Status: SHIPPED | OUTPATIENT
Start: 2024-06-22 | End: 2024-07-06

## 2024-06-22 RX ORDER — ENOXAPARIN SODIUM 100 MG/ML
30 INJECTION SUBCUTANEOUS 2 TIMES DAILY
Status: CANCELLED | OUTPATIENT
Start: 2024-06-22

## 2024-06-22 RX ORDER — OXYCODONE HYDROCHLORIDE 5 MG/1
5 TABLET ORAL EVERY 4 HOURS PRN
Status: CANCELLED | OUTPATIENT
Start: 2024-06-22

## 2024-06-22 RX ORDER — LIDOCAINE 4 G/G
1 PATCH TOPICAL EVERY 12 HOURS
Qty: 28 EACH | Refills: 0 | Status: SHIPPED | OUTPATIENT
Start: 2024-06-22 | End: 2024-07-06

## 2024-06-22 RX ORDER — METHOCARBAMOL 500 MG/1
500 TABLET, FILM COATED ORAL EVERY 6 HOURS
Status: DISCONTINUED | OUTPATIENT
Start: 2024-06-22 | End: 2024-06-30 | Stop reason: HOSPADM

## 2024-06-22 RX ORDER — OXYCODONE HYDROCHLORIDE 5 MG/1
5 TABLET ORAL EVERY 6 HOURS PRN
Qty: 20 TABLET | Refills: 0 | Status: ON HOLD | OUTPATIENT
Start: 2024-06-22 | End: 2024-06-30 | Stop reason: HOSPADM

## 2024-06-22 RX ORDER — BISACODYL 10 MG
10 SUPPOSITORY, RECTAL RECTAL DAILY PRN
Status: DISCONTINUED | OUTPATIENT
Start: 2024-06-22 | End: 2024-06-23 | Stop reason: SDUPTHER

## 2024-06-22 RX ORDER — ASPIRIN 81 MG/1
81 TABLET, CHEWABLE ORAL 2 TIMES DAILY
Status: DISCONTINUED | OUTPATIENT
Start: 2024-06-22 | End: 2024-06-22

## 2024-06-22 RX ORDER — METHOCARBAMOL 500 MG/1
500 TABLET, FILM COATED ORAL EVERY 6 HOURS
Qty: 56 TABLET | Refills: 0 | Status: SHIPPED | OUTPATIENT
Start: 2024-06-22 | End: 2024-07-06

## 2024-06-22 RX ORDER — POLYETHYLENE GLYCOL 3350 17 G/17G
17 POWDER, FOR SOLUTION ORAL DAILY
Status: DISCONTINUED | OUTPATIENT
Start: 2024-06-23 | End: 2024-06-30 | Stop reason: HOSPADM

## 2024-06-22 RX ORDER — ASPIRIN 81 MG/1
81 TABLET, CHEWABLE ORAL 2 TIMES DAILY
Status: CANCELLED | OUTPATIENT
Start: 2024-06-22 | End: 2024-08-03

## 2024-06-22 RX ORDER — BISACODYL 10 MG
10 SUPPOSITORY, RECTAL RECTAL DAILY PRN
Status: CANCELLED | OUTPATIENT
Start: 2024-06-22

## 2024-06-22 RX ORDER — OXYCODONE HYDROCHLORIDE 5 MG/1
10 TABLET ORAL EVERY 4 HOURS PRN
Status: DISCONTINUED | OUTPATIENT
Start: 2024-06-22 | End: 2024-06-30 | Stop reason: HOSPADM

## 2024-06-22 RX ORDER — AMLODIPINE BESYLATE 10 MG/1
10 TABLET ORAL DAILY
Status: CANCELLED | OUTPATIENT
Start: 2024-06-23

## 2024-06-22 RX ORDER — METHOCARBAMOL 500 MG/1
500 TABLET, FILM COATED ORAL EVERY 6 HOURS
Status: CANCELLED | OUTPATIENT
Start: 2024-06-22

## 2024-06-22 RX ORDER — OXYCODONE HYDROCHLORIDE 5 MG/1
10 TABLET ORAL EVERY 4 HOURS PRN
Status: CANCELLED | OUTPATIENT
Start: 2024-06-22

## 2024-06-22 RX ORDER — ACETAMINOPHEN 325 MG/1
650 TABLET ORAL EVERY 6 HOURS
Status: CANCELLED | OUTPATIENT
Start: 2024-06-22

## 2024-06-22 RX ORDER — METOPROLOL TARTRATE 50 MG/1
100 TABLET, FILM COATED ORAL 2 TIMES DAILY
Status: CANCELLED | OUTPATIENT
Start: 2024-06-22

## 2024-06-22 RX ORDER — IBUPROFEN 400 MG/1
400 TABLET ORAL EVERY 6 HOURS
Status: DISCONTINUED | OUTPATIENT
Start: 2024-06-22 | End: 2024-06-24

## 2024-06-22 RX ORDER — ACETAMINOPHEN 325 MG/1
650 TABLET ORAL EVERY 6 HOURS
Status: DISCONTINUED | OUTPATIENT
Start: 2024-06-22 | End: 2024-06-30 | Stop reason: HOSPADM

## 2024-06-22 RX ORDER — AMLODIPINE BESYLATE 10 MG/1
10 TABLET ORAL DAILY
Status: DISCONTINUED | OUTPATIENT
Start: 2024-06-23 | End: 2024-06-30 | Stop reason: HOSPADM

## 2024-06-22 RX ORDER — ASPIRIN 81 MG/1
81 TABLET, CHEWABLE ORAL 2 TIMES DAILY
Status: DISCONTINUED | OUTPATIENT
Start: 2024-06-22 | End: 2024-06-30 | Stop reason: HOSPADM

## 2024-06-22 RX ORDER — METOPROLOL TARTRATE 50 MG/1
100 TABLET, FILM COATED ORAL 2 TIMES DAILY
Status: DISCONTINUED | OUTPATIENT
Start: 2024-06-22 | End: 2024-06-30 | Stop reason: HOSPADM

## 2024-06-22 RX ORDER — ATORVASTATIN CALCIUM 20 MG/1
20 TABLET, FILM COATED ORAL DAILY
Status: DISCONTINUED | OUTPATIENT
Start: 2024-06-23 | End: 2024-06-22 | Stop reason: SDUPTHER

## 2024-06-22 RX ORDER — IBUPROFEN 400 MG/1
400 TABLET ORAL EVERY 6 HOURS
Status: CANCELLED | OUTPATIENT
Start: 2024-06-22

## 2024-06-22 RX ORDER — POLYETHYLENE GLYCOL 3350 17 G/17G
17 POWDER, FOR SOLUTION ORAL DAILY
Status: CANCELLED | OUTPATIENT
Start: 2024-06-23

## 2024-06-22 RX ADMIN — METHOCARBAMOL 500 MG: 500 TABLET ORAL at 19:59

## 2024-06-22 RX ADMIN — AMLODIPINE BESYLATE 10 MG: 10 TABLET ORAL at 08:05

## 2024-06-22 RX ADMIN — SODIUM CHLORIDE, PRESERVATIVE FREE 10 ML: 5 INJECTION INTRAVENOUS at 08:05

## 2024-06-22 RX ADMIN — ACETAMINOPHEN 325MG 650 MG: 325 TABLET ORAL at 19:59

## 2024-06-22 RX ADMIN — ATORVASTATIN CALCIUM 20 MG: 20 TABLET, FILM COATED ORAL at 19:59

## 2024-06-22 RX ADMIN — METOPROLOL TARTRATE 100 MG: 25 TABLET, FILM COATED ORAL at 08:04

## 2024-06-22 RX ADMIN — METOPROLOL TARTRATE 100 MG: 50 TABLET, FILM COATED ORAL at 19:59

## 2024-06-22 RX ADMIN — POLYETHYLENE GLYCOL 3350 17 G: 17 POWDER, FOR SOLUTION ORAL at 08:05

## 2024-06-22 RX ADMIN — OXYCODONE HYDROCHLORIDE 5 MG: 5 TABLET ORAL at 08:22

## 2024-06-22 RX ADMIN — METHOCARBAMOL 500 MG: 500 TABLET ORAL at 00:32

## 2024-06-22 RX ADMIN — ACETAMINOPHEN 325MG 650 MG: 325 TABLET ORAL at 00:32

## 2024-06-22 RX ADMIN — IBUPROFEN 400 MG: 400 TABLET, FILM COATED ORAL at 19:59

## 2024-06-22 RX ADMIN — SODIUM CHLORIDE 3000 MG: 900 INJECTION INTRAVENOUS at 06:32

## 2024-06-22 RX ADMIN — METHOCARBAMOL 500 MG: 500 TABLET ORAL at 13:12

## 2024-06-22 RX ADMIN — METHOCARBAMOL 500 MG: 500 TABLET ORAL at 06:20

## 2024-06-22 RX ADMIN — ATORVASTATIN CALCIUM 20 MG: 20 TABLET, FILM COATED ORAL at 08:06

## 2024-06-22 RX ADMIN — ACETAMINOPHEN 325MG 650 MG: 325 TABLET ORAL at 06:20

## 2024-06-22 RX ADMIN — IBUPROFEN 400 MG: 400 TABLET, FILM COATED ORAL at 17:05

## 2024-06-22 RX ADMIN — KETOROLAC TROMETHAMINE 15 MG: 15 INJECTION, SOLUTION INTRAMUSCULAR; INTRAVENOUS at 13:11

## 2024-06-22 RX ADMIN — ENOXAPARIN SODIUM 30 MG: 100 INJECTION SUBCUTANEOUS at 08:04

## 2024-06-22 RX ADMIN — KETOROLAC TROMETHAMINE 15 MG: 15 INJECTION, SOLUTION INTRAMUSCULAR; INTRAVENOUS at 00:32

## 2024-06-22 RX ADMIN — ACETAMINOPHEN 325MG 650 MG: 325 TABLET ORAL at 13:12

## 2024-06-22 RX ADMIN — ASPIRIN 81 MG CHEWABLE TABLET 81 MG: 81 TABLET CHEWABLE at 19:59

## 2024-06-22 RX ADMIN — KETOROLAC TROMETHAMINE 15 MG: 15 INJECTION, SOLUTION INTRAMUSCULAR; INTRAVENOUS at 06:20

## 2024-06-22 ASSESSMENT — PAIN - FUNCTIONAL ASSESSMENT
PAIN_FUNCTIONAL_ASSESSMENT: PREVENTS OR INTERFERES SOME ACTIVE ACTIVITIES AND ADLS
PAIN_FUNCTIONAL_ASSESSMENT: PREVENTS OR INTERFERES SOME ACTIVE ACTIVITIES AND ADLS

## 2024-06-22 ASSESSMENT — PAIN DESCRIPTION - PAIN TYPE: TYPE: ACUTE PAIN

## 2024-06-22 ASSESSMENT — PAIN DESCRIPTION - DESCRIPTORS
DESCRIPTORS: ACHING
DESCRIPTORS: DISCOMFORT
DESCRIPTORS: ACHING
DESCRIPTORS: ACHING;DISCOMFORT
DESCRIPTORS: ACHING

## 2024-06-22 ASSESSMENT — PAIN DESCRIPTION - LOCATION
LOCATION: KNEE;LEG
LOCATION: KNEE
LOCATION: HIP
LOCATION: KNEE;LEG
LOCATION: LEG;KNEE
LOCATION: KNEE;LEG
LOCATION: KNEE

## 2024-06-22 ASSESSMENT — PAIN SCALES - GENERAL
PAINLEVEL_OUTOF10: 3
PAINLEVEL_OUTOF10: 0
PAINLEVEL_OUTOF10: 4
PAINLEVEL_OUTOF10: 5
PAINLEVEL_OUTOF10: 4
PAINLEVEL_OUTOF10: 3
PAINLEVEL_OUTOF10: 2
PAINLEVEL_OUTOF10: 3

## 2024-06-22 ASSESSMENT — ENCOUNTER SYMPTOMS
ABDOMINAL PAIN: 0
STRIDOR: 0
COUGH: 0
DIARRHEA: 0
EYE REDNESS: 0
SHORTNESS OF BREATH: 1
CONSTIPATION: 1
SORE THROAT: 0
BACK PAIN: 1
WHEEZING: 0
BLOOD IN STOOL: 0
PHOTOPHOBIA: 0
VOMITING: 0
EYE PAIN: 0
NAUSEA: 0

## 2024-06-22 ASSESSMENT — PAIN DESCRIPTION - ORIENTATION
ORIENTATION: RIGHT

## 2024-06-22 NOTE — PROGRESS NOTES
Report called to Rehab. Spoke with MITCHEL Olmos. Patient to go to room 260. Electronically signed by Deidre Dillard RN on 6/22/2024 at 2:14 PM

## 2024-06-22 NOTE — PROGRESS NOTES
Physical Therapy Med Surg Initial Assessment  [place] [facility] [place] [place]  [facility]       NAME: Kane Gibbs  : 1955 (68 y.o.)  MRN: [id number]  CODE STATUS: Full Code    Date of Service: 2024    Patient Diagnosis(es): Closed fracture of distal end of right femur, unspecified fracture morphology, initial encounter ([facility]) [S72.401A]   Chief Complaint   Patient presents with    Fall     Right leg injury      Patient Active Problem List    Diagnosis Date Noted    Closed fracture of distal end of right femur, unspecified fracture morphology, initial encounter ([facility]) 2024    Primary osteoarthritis of right knee 2017    Hypertension         Past Medical History:   Diagnosis Date    Hypertension      History reviewed. No pertinent surgical history.         Restrictions:  Restrictions/Precautions: Fall Risk, ROM Restrictions, Weight Bearing  Lower Extremity Weight Bearing Restrictions  Right Lower Extremity Weight Bearing: Non Weight Bearing     SUBJECTIVE:   Subjective: Pt resting comfotably and eager to get out of bed.    Pain      Pre Pain:  Pain Rating (0-10 pain scale):   3-4/10   Location: Rt LE   Description: aching   [x] Agreeable for treatment      POST-PAIN:  Pain Rating (0-10 pain scale):   3-4/10   Location and pain description same as pre-treatment unless indicated.   Action: [] NA   [x]  RN notified       Prior Level of Function:  Social/Functional History  Lives With: Alone  Type of Home: House  Home Layout: Multi-level (steps 7 up and 4 down with hand rails on both sides)  Home Access: Stairs to enter without rails  Entrance Stairs - Number of Steps: 1  Bathroom Shower/Tub: None (walk in tub.)  Bathroom Toilet: Standard  Bathroom Equipment: Grab bars in shower, Grab bars around toilet, Hand-held shower  Bathroom Accessibility: Accessible  Home Equipment: None, Walker - 4-Wheeled (toilet raiser)  Receives Help From: Family  ADL Assistance: Independent  Homemaking

## 2024-06-22 NOTE — PROGRESS NOTES
Assistance: Independent  Transfer Assistance: Independent  Active : Yes  Mode of Transportation: Car  Occupation: Full time employment  Leisure & Hobbies: motorcycle    OBJECTIVE:     Orientation Status:  Orientation  Overall Orientation Status: Within Functional Limits    Observation:  Observation/Palpation  Observation: pt in bed, RA, RLE wrapped in bandage, no acute signs of distress    Cognition Status:  Cognition  Cognition Comment: slightly impulsive, decreased safety (quick pace-VC's to slow pace for safety), VC's to maintain NWB restrictions    Perception Status:  Perception  Overall Perceptual Status: WFL    Vision and Hearing Status:  Vision  Vision Exceptions: Wears glasses for reading  Hearing  Hearing: Exceptions to WFL  Hearing Exceptions: Hard of hearing/hearing concerns (mild)        GROSS ASSESSMENT AROM/PROM:  AROM: Within functional limits       ROM:   LUE AROM (degrees)  LUE AROM : WFL  Left Hand AROM (degrees)  Left Hand AROM: WFL  RUE AROM (degrees)  RUE AROM : WFL  Right Hand AROM (degrees)  Right Hand AROM: WFL    UE STRENGTH:  Strength: Within functional limits (5/5 BUE)    UE COORDINATION:  Coordination: Within functional limits    UE TONE:  Tone: Normal    UE SENSATION:  Sensation: Intact    Hand Dominance:  Hand Dominance  Hand Dominance: Right    ADL Status:  ADL  Feeding: Independent  Grooming: Setup  UE Bathing: Setup;Stand by assistance  LE Bathing: Minimal assistance  UE Dressing: Setup;Supervision  LE Dressing: Maximum assistance  LE Dressing Skilled Clinical Factors: pt unable to don/doff socks  Toileting: Moderate assistance  Additional Comments: ADL's simulated unless otherwise stated above  Toilet Transfers  Toilet Transfers Comments: anticipated Min A    Functional Mobility:    Transfers  Sit to stand: Minimal assistance  Stand to sit: Minimal assistance  Transfer Comments: VC's to maintain NWB    Patient ambulated to bedside chair with WW at Mod A level with cues for  safety with pace.     Bed Mobility  Bed mobility  Supine to Sit: Supervision  Sit to Supine: Unable to assess (pt in chair)  Bed Mobility Comments: HOB elevated    Seated and Standing Balance:  Balance  Sitting: Intact  Standing: Impaired  Standing - Static: Fair  Standing - Dynamic: Poor (assist to correct LOB when attempting to initialy ambulate to chair)    Functional Endurance:  Activity Tolerance  Activity Tolerance: Patient Tolerated treatment well    D/C Recommendations:  OT D/C RECOMMENDATIONS  REQUIRES OT FOLLOW-UP: Yes    Equipment Recommendations:  OT Equipment Recommendations  Equipment Needed: Yes  Mobility Devices: ADL Assistive Devices  ADL Assistive Devices: Sock-Aid Hard;Long-handled Sponge;Reacher    OT Education:   Patient Education  Education Given To: Patient  Education Provided: Role of Therapy;Plan of Care  Education Method: Verbal  Barriers to Learning: None  Education Outcome: Verbalized understanding    OT Follow Up:   OT D/C RECOMMENDATIONS  REQUIRES OT FOLLOW-UP: Yes       Assessment/Discharge Disposition:  Assessment: Pt is a 68 y.o. male s/p RIGHT FEMUR INTRAMEDULLARY NAIL RETROGRADE. Pt NWB into RLE with no ROM of knee. Pt with above mentioned deficits impairing ability to complete ADL's at reported baseline. Pt may benefit from OT service to address deficits and maximize safety and fuction during ADL's.  Performance deficits / Impairments: Decreased ADL status, Decreased functional mobility , Decreased safe awareness, Decreased endurance, Decreased balance, Decreased high-level IADLs  Prognosis: Good  Discharge Recommendations: Continue to assess pending progress  Decision Making: Medium Complexity     History: multi comorb  Exam: 6 perf imp  Assistance / Modification: Mod A    AMPAC (Six Click) Self care Score   How much help is needed for putting on and taking off regular lower body clothing?: A Lot  How much help is needed for bathing (which includes washing, rinsing, drying)?: A

## 2024-06-22 NOTE — CARE COORDINATION
[facility] to [facility] when he is discharged. The plan is for patient to go to room 258 today per Mackenzie. Pt is an employee here and is agreeable to plan and wants [facility].

## 2024-06-22 NOTE — PLAN OF CARE
Therapy evaluation completed.  Please see daily notes and/or progress notes for details related to planned treatment interventions, goals and functional performance.    Electronically signed by Ebony Han PT on 6/22/24 at 10:02 AM EDT

## 2024-06-22 NOTE — PROGRESS NOTES
See OT evaluation for all goals and OT POC. Electronically signed by Silverio Thomas OTR/L on 6/22/2024 at 10:08 AM

## 2024-06-22 NOTE — PROGRESS NOTES
Department of Orthopedic Surgery  Attending Progress Note      SUBJECTIVE: The patient is sitting up in a chair.  His pain is well-controlled.    OBJECTIVE    Physical    VITALS:  /65   Pulse 70   Temp 97.9 °F (36.6 °C) (Tympanic)   Resp 18   Ht 1.829 m (6')   Wt 136.1 kg (300 lb)   SpO2 99%   BMI 40.69 kg/m²   The immobilizer is intact  The patient has intact ankle dorsiflexion and plantarflexion on the right.  Calf is soft, Homans negative    Data    CBC with Differential:    Lab Results   Component Value Date/Time    WBC 7.1 06/22/2024 08:24 AM    RBC 4.24 06/22/2024 08:24 AM    HGB 12.3 06/22/2024 08:24 AM    HCT 36.3 06/22/2024 08:24 AM     06/22/2024 08:24 AM    MCV 85.6 06/22/2024 08:24 AM    MCH 29.0 06/22/2024 08:24 AM    MCHC 33.9 06/22/2024 08:24 AM    RDW 13.0 06/22/2024 08:24 AM    LYMPHOPCT 31.4 06/21/2024 10:00 AM    MONOPCT 10.2 06/21/2024 10:00 AM    EOSPCT 3.7 06/21/2024 10:00 AM    BASOPCT 1.3 06/21/2024 10:00 AM    MONOSABS 0.6 06/21/2024 10:00 AM    EOSABS 0.2 06/21/2024 10:00 AM    BASOSABS 0.1 06/21/2024 10:00 AM     BMP:    Lab Results   Component Value Date/Time     06/22/2024 08:24 AM    K 4.5 06/22/2024 08:24 AM     06/22/2024 08:24 AM    CO2 26 06/22/2024 08:24 AM    BUN 20 06/22/2024 08:24 AM    CREATININE 0.97 06/22/2024 08:24 AM    CALCIUM 8.6 06/22/2024 08:24 AM    GFRAA >60.0 06/22/2022 09:34 AM    LABGLOM 84.8 06/22/2024 08:24 AM    LABGLOM >60.0 06/14/2023 09:02 AM    GLUCOSE 157 06/22/2024 08:24 AM    GLUCOSE 103 01/20/2012 08:38 AM     Current Inpatient Medications    Current Facility-Administered Medications: HYDROmorphone (DILAUDID) injection 0.25 mg, 0.25 mg, IntraVENous, Q4H PRN  sodium chloride flush 0.9 % injection 5-40 mL, 5-40 mL, IntraVENous, 2 times per day  sodium chloride flush 0.9 % injection 5-40 mL, 5-40 mL, IntraVENous, PRN  0.9 % sodium chloride infusion, , IntraVENous, PRN  potassium chloride 20 mEq/50 mL IVPB (Central Line), 20  exam:->Post Op Asymptomatic  What reading provider will be dictating this exam?->CRC     FINDINGS:  Four views demonstrate long IM luz maria placement transfixing distal femoral  fracture.  There are proximal and distal interlocking screws without  complication.  Alignment is near anatomic.     IMPRESSION:  1. Status post ORIF of distal femoral fracture.          ASSESSMENT AND PLAN      Postop day 1 retrograde nailing right distal third femoral shaft fracture    Mobilize toe-touch weightbearing on the right  Discontinue knee immobilizer  Transfer to rehab today  All questions answered

## 2024-06-22 NOTE — CARE COORDINATION
[facility] Pre-Admission Screening Document      Patient Name: Kane Gibbs       MRN: [id number]    : 1955    Age: 68 y.o.  Gender: male   Payor: Payor: GENERIC MCO / Plan: GENERIC MCO WC / Product Type: *No Product type* /   MSSP: Yes    Admitted from: [facility] Floor: 2W  Attending Care Provider: Dany Doherty MD  Inpatient Rehab Referring Care Provider:  Janel Carpio PA   Primary Care Provider: Christiano Sapp MD  Inpatient Treatment Team including Consults: Treatment Team: Attending Provider: Dany Doherty MD; Consulting Physician: Dany Doherty MD; Surgeon: Gilmar Weiss MD; Registered Nurse: Silverio Gray, RN; Registered Nurse: Deidre Dillard RN; Utilization Reviewer: Valeria Vu RN; : Isabel Rondon RN    Reason for Hospitalization:   1. Closed fracture of distal end of right femur, unspecified fracture morphology, initial encounter ([facility])      Chief Complaint   Patient presents with    Fall     Right leg injury      Isolation:No active isolations    Hospital Course:  Admit Date: 2024  9:54 AM  Inpatient Rehab Referral Date: 24   Narrative of hospital course/history of present illness: 68 y.o. male working at [employer] when he slipped and fell to the floor in a seated position with right leg bent under left leg. Patient states that he heard a \"pop\" and experienced immediate severe right kneeling/leg pain. He did not strike his head and had no LOC. CT of right knee showed complex acute fracture of the distal femur. Acute fracture of the proximal fibula. Patient underwent a Right Femur Intramedullary Nail retrograde for closed fracture of distal end of right femur.     Medical & Surgical History/Current Comorbidities:  Past Medical History:   Diagnosis Date    Hypertension      History reviewed. No pertinent surgical history.    Advanced Directives:  Advance Directives       Power of

## 2024-06-22 NOTE — PROGRESS NOTES
Assessment completed this shift. Alert and oriented x4. Lungs clear and BS present x4 quads.Denies nausea. Aquacel dressing to right outer thigh and Ace wrap to RLE clean, dry, and intact.  Oxycodone 5 mg given 0822 for reported aching pain 4/10 LLE. Patient expressed his desire to go home as soon as possible. Will continue to monitor.  Electronically signed by Deidre Dillard RN on 6/22/2024 at 9:30 AM

## 2024-06-22 NOTE — DISCHARGE SUMMARY
mechanical fall resulting in right distal femur and fibular head fracture. Ortho consulted. Admitted to Trauma. S/p IMN to right femur  6/22/2024: Hgb 3 gm drop, suspect equilibrating s/p trauma and surgery. Remains HDS. Is not requiring IV opioids. Discussion with both Dr. Gaines and Dr. Doherty regarding transfer to acute rehab for continued H/H trend. Ok to transfer as acute blood loss anemia is likely due to intra-operative blood loss and not from active bleed.     At time of discharge, Kane was tolerating a regular diet, passing flatus, and had adequate analgesia on oral pain medications.  Pt's activity level was OOBAT. The patient had no signs or symptoms of complications. Patient was determined stable for discharge to: Acute Rehab    The patient was seen and examined on the day of discharge with the following findings:  Constitutional: sitting up in bedside chair. NAD. Conversant  Respiratory: non-labored breathing on RA  Cardiovascular: DP palpable and equal to BLE. Brisk capillary refill to BLE. RRR  Abdomen: soft, non-distended, NTTP  MSK: RLE with KI and ACE wrap extending from upper thigh to foot.  Skin: warm/dry        ANTICIPATED FOLLOW UP:  Future Appointments   Date Time Provider Department Center   12/11/2024  8:00 AM Christiano Sapp MD [facility]     No discharge procedures on file.    Other indicated follow up and instructions for scheduling:  Follow up with Dr. Weiss (Ortho) in 2 weeks for post-op evaluation      VTE RISK AT DISCHARGE:  Per trauma program protocol, patient DOES REQUIRE post-discharge VTE prophylaxis. Patient will require Aspirin 81mg BID for 6 weeks until Aug 3, 2024.    --  Janel Carpio PA-C  Trauma/Critical Care/ Emergency General Surgery    [see treatment team sticky note for contact information]      35 minutes were spent on the discharge of this patient including final examination of the patient, discussion of the hospital stay, instructions for continuing

## 2024-06-22 NOTE — CONSULTS
Physical Medicine & Rehabilitation  Consult Note      Admitting Physician: Dany Doherty MD    Primary Care Provider: Christiano Sapp MD     Reason for Consult:  Asses rehab needs, promote physical and mental function, analyze level of care to determine rehab needs, improve ability to actively participate in the rehabilitation process, and decrease likelihood of re-admit to the hospital after discharge.      History of Present Illness:    Kane Gibbs is a 68 y.o. male admitted to [facility] on 6/21/2024.     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  He was at work at [employer] where he works part-time and slipped on the floor fracturing his hip-  Date: 06/21/24 Room / Location: [address]      Anesthesia Start: 1533 Anesthesia Stop: 1757     Procedure: RIGHT FEMUR INTRAMEDULLARY NAIL RETROGRADE. (Right: Leg Lower) Diagnosis:       Closed fracture of distal end of right femur, unspecified fracture morphology, initial encounter ([facility])      (Closed fracture of distal end of right femur, unspecified fracture morphology, initial encounter ([facility]) [S72.401A])     Surgeons: Gilmar Weiss MD Responsible Provider: Amairani Stout MD     Anesthesia Type: general, regional ASA Status: 2 - Emergent           Fall  The accident occurred 12 to 24 hours ago. He landed on Hard floor. There was no blood loss. The point of impact was the right hip. The pain is present in the right hip. The pain is at a severity of 10/10. The pain is severe. The symptoms are aggravated by pressure on injury, standing and use of injured limb. Associated symptoms include numbness. Pertinent negatives include no abdominal pain, fever, headaches, hearing loss, hematuria, loss of consciousness, nausea or vomiting. He has tried rest, immobilization, ice, acetaminophen and elevation for the symptoms. The treatment provided moderate relief.   Back Pain  This is a recurrent  if necessary spread therapy out over a 7-day window-adding scheduled rest breaks when needed.  Focus on energy conservation.  Monitor heart rate and   cardiac medications effects on heart rate and blood pressure before, during and after therapy.  Progress toward endurance training with pulse ox monitoring for saturation and heart rate.    continue to monitor closely for dehydration-- Improve hydration and nutrition by adding Vitamin B12 shot times one, adding Protein supplements and push PO fluids.    Treat and monitor for higher level cognitive deficits, focus on difficulty with sequencing and problem-solving.    Focus on higher-level balance and falls risk issues focusing on balance training and monitoring for orthostasis.      Above recommendations are indicated to address medical complexity and need for appropriate rehab services.  Will tailor individual care and rehab plan per individuals needs re improved pain control especially status post cute rehab once PT and OT eval.    Focus of today's plan-   await PT and OT eval      Required Certification Data (potential inpatient rehabilitation facility patient's only)    Deficits:weakness, nutrition, mobility, impaired gait, high risk for falls, debility, balance, ADL's, adjustment to disability, and pain limiting function    Disability:mobility, locomotion, self care, and bowel/ bladder status    Potential barriers to progress/discharge:complex medical conditions, severe pain, and weight baring status         It was my pleasure to evaluate Kane Gibbs today.  Please call [phone] with questions.    Leila Gaines DO     FAAPMR

## 2024-06-22 NOTE — CARE COORDINATION
Spoke to patient regarding Inpatient Rehab Referral (IRF) referral. Discussed IRF mission, goals, daily process, 3 hours of intensive therapy per day, approximate length of stay, and discharge planning with goal of home with Home Health Care (HHC) RN, PT, OT, and an aide. Also discussed Outpatient therapy versus HHC. Patient stated that want HHC when he first goes home as he lives alone. Patient also stated that his brother  will be coming in from out of town to help him with his care. Patient asked many questions and verbalized understanding when answered. He stated that he is anxious to start rehab and get back to his normal routine. Asked about pain level and he stated, \"I have very little pain. It is more like a dull ache where they did the nailing.\" He is determined to work very hard and get home \"in a few days\". Notified CM and patient's RN that IRF is ready to accept patient today into rehab if discharged from medical., Patient will admit into Room 258 on rehab. Electronically signed by Isabel Rondon RN on 6/22/24 at 12:26 PM EDT

## 2024-06-23 LAB
EKG ATRIAL RATE: 67 BPM
EKG P AXIS: 29 DEGREES
EKG P-R INTERVAL: 192 MS
EKG Q-T INTERVAL: 396 MS
EKG QRS DURATION: 74 MS
EKG QTC CALCULATION (BAZETT): 418 MS
EKG R AXIS: -1 DEGREES
EKG T AXIS: 6 DEGREES
EKG VENTRICULAR RATE: 67 BPM
GLUCOSE BLD-MCNC: 114 MG/DL (ref 70–99)
GLUCOSE BLD-MCNC: 116 MG/DL (ref 70–99)
GLUCOSE BLD-MCNC: 161 MG/DL (ref 70–99)
PERFORMED ON: ABNORMAL

## 2024-06-23 PROCEDURE — 6370000000 HC RX 637 (ALT 250 FOR IP): Performed by: PHYSICIAN ASSISTANT

## 2024-06-23 PROCEDURE — 97166 OT EVAL MOD COMPLEX 45 MIN: CPT

## 2024-06-23 PROCEDURE — 97162 PT EVAL MOD COMPLEX 30 MIN: CPT

## 2024-06-23 PROCEDURE — 1180000000 HC REHAB R&B

## 2024-06-23 RX ORDER — INSULIN LISPRO 100 [IU]/ML
0-8 INJECTION, SOLUTION INTRAVENOUS; SUBCUTANEOUS
Status: DISCONTINUED | OUTPATIENT
Start: 2024-06-23 | End: 2024-06-30 | Stop reason: HOSPADM

## 2024-06-23 RX ORDER — ENEMA 19; 7 G/133ML; G/133ML
1 ENEMA RECTAL DAILY PRN
Status: DISCONTINUED | OUTPATIENT
Start: 2024-06-23 | End: 2024-06-30 | Stop reason: HOSPADM

## 2024-06-23 RX ORDER — DEXTROSE MONOHYDRATE 100 MG/ML
INJECTION, SOLUTION INTRAVENOUS CONTINUOUS PRN
Status: DISCONTINUED | OUTPATIENT
Start: 2024-06-23 | End: 2024-06-30 | Stop reason: HOSPADM

## 2024-06-23 RX ORDER — GLUCAGON 1 MG/ML
1 KIT INJECTION PRN
Status: DISCONTINUED | OUTPATIENT
Start: 2024-06-23 | End: 2024-06-30 | Stop reason: HOSPADM

## 2024-06-23 RX ORDER — INSULIN LISPRO 100 [IU]/ML
0-4 INJECTION, SOLUTION INTRAVENOUS; SUBCUTANEOUS NIGHTLY
Status: DISCONTINUED | OUTPATIENT
Start: 2024-06-23 | End: 2024-06-30 | Stop reason: HOSPADM

## 2024-06-23 RX ORDER — BISACODYL 10 MG
10 SUPPOSITORY, RECTAL RECTAL DAILY PRN
Status: DISCONTINUED | OUTPATIENT
Start: 2024-06-23 | End: 2024-06-30 | Stop reason: HOSPADM

## 2024-06-23 RX ORDER — ACETAMINOPHEN 325 MG/1
650 TABLET ORAL EVERY 4 HOURS PRN
Status: DISCONTINUED | OUTPATIENT
Start: 2024-06-23 | End: 2024-06-30 | Stop reason: HOSPADM

## 2024-06-23 RX ADMIN — METHOCARBAMOL 500 MG: 500 TABLET ORAL at 14:44

## 2024-06-23 RX ADMIN — IBUPROFEN 400 MG: 400 TABLET, FILM COATED ORAL at 07:55

## 2024-06-23 RX ADMIN — ASPIRIN 81 MG CHEWABLE TABLET 81 MG: 81 TABLET CHEWABLE at 07:55

## 2024-06-23 RX ADMIN — METOPROLOL TARTRATE 100 MG: 50 TABLET, FILM COATED ORAL at 07:55

## 2024-06-23 RX ADMIN — OXYCODONE HYDROCHLORIDE 10 MG: 5 TABLET ORAL at 08:17

## 2024-06-23 RX ADMIN — ACETAMINOPHEN 325MG 650 MG: 325 TABLET ORAL at 19:25

## 2024-06-23 RX ADMIN — ACETAMINOPHEN 325MG 650 MG: 325 TABLET ORAL at 14:45

## 2024-06-23 RX ADMIN — ACETAMINOPHEN 325MG 650 MG: 325 TABLET ORAL at 05:53

## 2024-06-23 RX ADMIN — METHOCARBAMOL 500 MG: 500 TABLET ORAL at 05:53

## 2024-06-23 RX ADMIN — METOPROLOL TARTRATE 100 MG: 50 TABLET, FILM COATED ORAL at 21:05

## 2024-06-23 RX ADMIN — ASPIRIN 81 MG CHEWABLE TABLET 81 MG: 81 TABLET CHEWABLE at 21:05

## 2024-06-23 RX ADMIN — POLYETHYLENE GLYCOL 3350 17 G: 17 POWDER, FOR SOLUTION ORAL at 07:55

## 2024-06-23 RX ADMIN — ACETAMINOPHEN 325MG 650 MG: 325 TABLET ORAL at 02:00

## 2024-06-23 RX ADMIN — OXYCODONE HYDROCHLORIDE 10 MG: 5 TABLET ORAL at 21:06

## 2024-06-23 RX ADMIN — AMLODIPINE BESYLATE 10 MG: 10 TABLET ORAL at 07:55

## 2024-06-23 RX ADMIN — IBUPROFEN 400 MG: 400 TABLET, FILM COATED ORAL at 02:00

## 2024-06-23 RX ADMIN — METHOCARBAMOL 500 MG: 500 TABLET ORAL at 02:00

## 2024-06-23 RX ADMIN — IBUPROFEN 400 MG: 400 TABLET, FILM COATED ORAL at 14:45

## 2024-06-23 RX ADMIN — METHOCARBAMOL 500 MG: 500 TABLET ORAL at 19:25

## 2024-06-23 RX ADMIN — IBUPROFEN 400 MG: 400 TABLET, FILM COATED ORAL at 21:05

## 2024-06-23 RX ADMIN — ATORVASTATIN CALCIUM 20 MG: 20 TABLET, FILM COATED ORAL at 21:07

## 2024-06-23 ASSESSMENT — PAIN DESCRIPTION - ORIENTATION
ORIENTATION: RIGHT

## 2024-06-23 ASSESSMENT — PAIN DESCRIPTION - DESCRIPTORS
DESCRIPTORS: ACHING

## 2024-06-23 ASSESSMENT — PAIN DESCRIPTION - LOCATION
LOCATION: LEG

## 2024-06-23 ASSESSMENT — PAIN SCALES - GENERAL
PAINLEVEL_OUTOF10: 3
PAINLEVEL_OUTOF10: 4
PAINLEVEL_OUTOF10: 4
PAINLEVEL_OUTOF10: 3
PAINLEVEL_OUTOF10: 7
PAINLEVEL_OUTOF10: 3

## 2024-06-23 NOTE — CONSULTS
Consult      Patient:  Kane Gibbs  YOB: 1955    MRN: [id number]     Acct: [id number]    Primary Care Physician: Christiano Sapp MD    HISTORY OF PRESENT ILLNESS:   History obtained from chart review and the patient.    The patient is a 68 y.o. male whom I have been requested to see by Dr. Gaines for evaluation of HTN/hyperglycemia. 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 Denied fever/dyspnea, SOB/dizziness. Has long standing HTN, chronic back pain    Past Medical History:        Diagnosis Date    Hypertension     Osteoarthritis        Past Surgical History:  No past surgical history on file.    Medications:    No current facility-administered medications on file prior to encounter.     Current Outpatient Medications on File Prior to Encounter   Medication Sig Dispense Refill    oxyCODONE (ROXICODONE) 5 MG immediate release tablet Take 1 tablet by mouth every 6 hours as needed for Pain (severe pain only) for up to 5 days. Max Daily Amount: 20 mg 20 tablet 0    acetaminophen (TYLENOL) 325 MG tablet Take 2 tablets by mouth every 6 hours for 14 days 112 tablet 0    lidocaine 4 % external patch Apply 1 patch topically in the morning and 1 patch in the evening. Do all this for 14 days. 28 each 0    polyethylene glycol (GLYCOLAX) 17 g packet Take 1 packet by mouth daily 30 packet 0    methocarbamol (ROBAXIN) 500 MG tablet Take 1 tablet by mouth every 6 hours for 14 days 56 tablet 0    metoprolol (LOPRESSOR) 100 MG tablet TAKE 1 TABLET BY MOUTH  TWICE DAILY 180 tablet 3    meloxicam (MOBIC) 15 MG tablet TAKE 1 TABLET BY MOUTH ONCE  DAILY 90 tablet 3    amLODIPine (NORVASC) 10 MG tablet TAKE 1 TABLET BY MOUTH DAILY 90 tablet 3    atorvastatin (LIPITOR) 20 MG tablet TAKE 1 TABLET BY MOUTH ONCE

## 2024-06-23 NOTE — FLOWSHEET NOTE
Patient assessment completed.  Patient is A&Ox4, able to make needs known, denies pain/sob, continues with routine meds to good effect.  Patient is heavy assist x1 to SPT to wheelchair, patient is TTWB to RLE.  Continent of bowel and bladder.  Currently resting quietly in bed, call light in reach, bed alarm engaged, will monitor.  Electronically signed by Zulema Yoo RN on 6/22/2024 at 8:05 PM

## 2024-06-23 NOTE — PLAN OF CARE
Problem: Safety - Adult  Goal: Free from fall injury  Outcome: Progressing     Problem: Discharge Planning  Goal: Discharge to home or other facility with appropriate resources  Outcome: Progressing  Flowsheets (Taken 6/22/2024 1815 by Lissa Reynoso RN)  Discharge to home or other facility with appropriate resources: Identify barriers to discharge with patient and caregiver     Problem: ABCDS Injury Assessment  Goal: Absence of physical injury  Outcome: Progressing

## 2024-06-23 NOTE — H&P
mobility:  Wheelchair Activities  Propulsion: No (\"A w/c will not fit through my house\") (06/23/24 1055)    Occupational Therapy:   Hand Dominance: Right  ADL  Feeding: Independent (06/23/24 1120)  Grooming: Independent (06/23/24 1120)  UE Bathing: Unable to assess (Comment) (06/23/24 1120)  UE Bathing Skilled Clinical Factors: declined bathing UB (06/23/24 1120)  LE Bathing: Unable to assess (Comment) (06/23/24 1120)  LE Bathing Skilled Clinical Factors: declined LE bathing (06/23/24 1120)  UE Dressing: Setup (06/23/24 1120)  LE Dressing: Stand by assistance;Verbal cueing (06/23/24 1120)  Toileting: Stand by assistance (06/23/24 1120)  Functional Mobility: Stand by assistance (06/23/24 1120)  Functional Mobility Skilled Clinical Factors: pivot transfer from w/c to toilet and bed, NWB status- use of manual w/c (06/23/24 1120)  Toilet Transfers  Toilet - Technique: Stand pivot (06/23/24 1151)  Equipment Used: Grab bars (06/23/24 1151)  Toilet Transfer: Stand by assistance (06/23/24 1151)  Toilet Transfers Comments: use of grab bars (06/23/24 1151)     Shower Transfers  Shower Transfers: Not tested (06/23/24 1151)    Speech Therapy:            Diet/Swallow:                         COGNITION:  OT:    SP:          Prior to admission patient was independent with all ADLs and mobilityand did not require any outside services.       Past Medical History:   Diagnosis Date    Hypertension     Osteoarthritis        No past surgical history on file.    Current Facility-Administered Medications   Medication Dose Route Frequency Provider Last Rate Last Admin    insulin lispro (HUMALOG,ADMELOG) injection vial 0-8 Units  0-8 Units SubCUTAneous TID WC Alin Vega MD        insulin lispro (HUMALOG,ADMELOG) injection vial 0-4 Units  0-4 Units SubCUTAneous Nightly Alin Vega MD        glucose chewable tablet 16 g  4 tablet Oral PRN Alin Vega MD        dextrose bolus 10% 125 mL  125 mL IntraVENous PRN Alin Vega MD        Or

## 2024-06-24 PROBLEM — M51.9 LUMBOSACRAL DISC DISEASE: Status: ACTIVE | Noted: 2024-06-24

## 2024-06-24 LAB
ANION GAP SERPL CALCULATED.3IONS-SCNC: 12 MEQ/L (ref 9–15)
BASOPHILS # BLD: 0.1 K/UL (ref 0–0.2)
BASOPHILS NFR BLD: 1 %
BUN SERPL-MCNC: 17 MG/DL (ref 8–23)
CALCIUM SERPL-MCNC: 8.5 MG/DL (ref 8.5–9.9)
CHLORIDE SERPL-SCNC: 107 MEQ/L (ref 95–107)
CO2 SERPL-SCNC: 26 MEQ/L (ref 20–31)
CREAT SERPL-MCNC: 0.92 MG/DL (ref 0.7–1.2)
EOSINOPHIL # BLD: 0.3 K/UL (ref 0–0.7)
EOSINOPHIL NFR BLD: 4.4 %
ERYTHROCYTE [DISTWIDTH] IN BLOOD BY AUTOMATED COUNT: 13.3 % (ref 11.5–14.5)
ESTIMATED AVERAGE GLUCOSE: 154 MG/DL
GLUCOSE BLD-MCNC: 123 MG/DL (ref 70–99)
GLUCOSE BLD-MCNC: 137 MG/DL (ref 70–99)
GLUCOSE BLD-MCNC: 160 MG/DL (ref 70–99)
GLUCOSE BLD-MCNC: 172 MG/DL (ref 70–99)
GLUCOSE SERPL-MCNC: 133 MG/DL (ref 70–99)
HBA1C MFR BLD: 7 % (ref 4–6)
HCT VFR BLD AUTO: 33.4 % (ref 42–52)
HGB BLD-MCNC: 10.8 G/DL (ref 14–18)
LYMPHOCYTES # BLD: 1.8 K/UL (ref 1–4.8)
LYMPHOCYTES NFR BLD: 29.5 %
MCH RBC QN AUTO: 28.4 PG (ref 27–31.3)
MCHC RBC AUTO-ENTMCNC: 32.3 % (ref 33–37)
MCV RBC AUTO: 87.9 FL (ref 79–92.2)
MONOCYTES # BLD: 0.8 K/UL (ref 0.2–0.8)
MONOCYTES NFR BLD: 12.3 %
NEUTROPHILS # BLD: 3.2 K/UL (ref 1.4–6.5)
NEUTS SEG NFR BLD: 52 %
PERFORMED ON: ABNORMAL
PLATELET # BLD AUTO: 181 K/UL (ref 130–400)
POTASSIUM SERPL-SCNC: 4.3 MEQ/L (ref 3.4–4.9)
RBC # BLD AUTO: 3.8 M/UL (ref 4.7–6.1)
SODIUM SERPL-SCNC: 145 MEQ/L (ref 135–144)
WBC # BLD AUTO: 6.2 K/UL (ref 4.8–10.8)

## 2024-06-24 PROCEDURE — 80048 BASIC METABOLIC PNL TOTAL CA: CPT

## 2024-06-24 PROCEDURE — 36415 COLL VENOUS BLD VENIPUNCTURE: CPT

## 2024-06-24 PROCEDURE — 99233 SBSQ HOSP IP/OBS HIGH 50: CPT | Performed by: PHYSICAL MEDICINE & REHABILITATION

## 2024-06-24 PROCEDURE — 97116 GAIT TRAINING THERAPY: CPT

## 2024-06-24 PROCEDURE — 1180000000 HC REHAB R&B

## 2024-06-24 PROCEDURE — 97535 SELF CARE MNGMENT TRAINING: CPT

## 2024-06-24 PROCEDURE — 6370000000 HC RX 637 (ALT 250 FOR IP): Performed by: PHYSICAL MEDICINE & REHABILITATION

## 2024-06-24 PROCEDURE — 97110 THERAPEUTIC EXERCISES: CPT

## 2024-06-24 PROCEDURE — 6360000002 HC RX W HCPCS: Performed by: PHYSICAL MEDICINE & REHABILITATION

## 2024-06-24 PROCEDURE — 85025 COMPLETE CBC W/AUTO DIFF WBC: CPT

## 2024-06-24 PROCEDURE — 83036 HEMOGLOBIN GLYCOSYLATED A1C: CPT

## 2024-06-24 PROCEDURE — 6370000000 HC RX 637 (ALT 250 FOR IP): Performed by: PHYSICIAN ASSISTANT

## 2024-06-24 RX ORDER — CYANOCOBALAMIN 1000 UG/ML
1000 INJECTION, SOLUTION INTRAMUSCULAR; SUBCUTANEOUS WEEKLY
Status: DISCONTINUED | OUTPATIENT
Start: 2024-06-24 | End: 2024-06-30 | Stop reason: HOSPADM

## 2024-06-24 RX ORDER — UBIDECARENONE 100 MG
100 CAPSULE ORAL DAILY
Status: DISCONTINUED | OUTPATIENT
Start: 2024-06-24 | End: 2024-06-30 | Stop reason: HOSPADM

## 2024-06-24 RX ORDER — VITAMIN B COMPLEX
2000 TABLET ORAL
Status: DISCONTINUED | OUTPATIENT
Start: 2024-06-24 | End: 2024-06-30 | Stop reason: HOSPADM

## 2024-06-24 RX ORDER — LIDOCAINE 4 G/G
3 PATCH TOPICAL DAILY
Status: DISCONTINUED | OUTPATIENT
Start: 2024-06-24 | End: 2024-06-30 | Stop reason: HOSPADM

## 2024-06-24 RX ORDER — IBUPROFEN 400 MG/1
400 TABLET ORAL EVERY 4 HOURS PRN
Status: DISCONTINUED | OUTPATIENT
Start: 2024-06-24 | End: 2024-06-30 | Stop reason: HOSPADM

## 2024-06-24 RX ADMIN — METHOCARBAMOL 500 MG: 500 TABLET ORAL at 07:42

## 2024-06-24 RX ADMIN — AMLODIPINE BESYLATE 10 MG: 10 TABLET ORAL at 07:42

## 2024-06-24 RX ADMIN — OXYCODONE HYDROCHLORIDE 10 MG: 5 TABLET ORAL at 21:04

## 2024-06-24 RX ADMIN — ATORVASTATIN CALCIUM 20 MG: 20 TABLET, FILM COATED ORAL at 21:04

## 2024-06-24 RX ADMIN — METHOCARBAMOL 500 MG: 500 TABLET ORAL at 21:04

## 2024-06-24 RX ADMIN — METOPROLOL TARTRATE 100 MG: 50 TABLET, FILM COATED ORAL at 07:42

## 2024-06-24 RX ADMIN — ACETAMINOPHEN 325MG 650 MG: 325 TABLET ORAL at 07:42

## 2024-06-24 RX ADMIN — ACETAMINOPHEN 325MG 650 MG: 325 TABLET ORAL at 11:54

## 2024-06-24 RX ADMIN — ASPIRIN 81 MG CHEWABLE TABLET 81 MG: 81 TABLET CHEWABLE at 21:04

## 2024-06-24 RX ADMIN — ACETAMINOPHEN 325MG 650 MG: 325 TABLET ORAL at 21:04

## 2024-06-24 RX ADMIN — Medication 100 MG: at 07:55

## 2024-06-24 RX ADMIN — METHOCARBAMOL 500 MG: 500 TABLET ORAL at 01:10

## 2024-06-24 RX ADMIN — CYANOCOBALAMIN 1000 MCG: 1000 INJECTION, SOLUTION INTRAMUSCULAR at 07:55

## 2024-06-24 RX ADMIN — METHOCARBAMOL 500 MG: 500 TABLET ORAL at 11:54

## 2024-06-24 RX ADMIN — Medication 2000 UNITS: at 16:40

## 2024-06-24 RX ADMIN — IBUPROFEN 400 MG: 400 TABLET, FILM COATED ORAL at 07:42

## 2024-06-24 RX ADMIN — ACETAMINOPHEN 325MG 650 MG: 325 TABLET ORAL at 01:10

## 2024-06-24 RX ADMIN — ASPIRIN 81 MG CHEWABLE TABLET 81 MG: 81 TABLET CHEWABLE at 07:42

## 2024-06-24 RX ADMIN — OXYCODONE HYDROCHLORIDE 10 MG: 5 TABLET ORAL at 11:56

## 2024-06-24 RX ADMIN — OXYCODONE HYDROCHLORIDE 10 MG: 5 TABLET ORAL at 16:47

## 2024-06-24 ASSESSMENT — PAIN SCALES - GENERAL
PAINLEVEL_OUTOF10: 6
PAINLEVEL_OUTOF10: 8
PAINLEVEL_OUTOF10: 5
PAINLEVEL_OUTOF10: 8
PAINLEVEL_OUTOF10: 8
PAINLEVEL_OUTOF10: 1
PAINLEVEL_OUTOF10: 6

## 2024-06-24 ASSESSMENT — PAIN DESCRIPTION - DESCRIPTORS
DESCRIPTORS: ACHING
DESCRIPTORS: ACHING;STABBING;SORE
DESCRIPTORS: STABBING;SORE
DESCRIPTORS: ACHING;SORE

## 2024-06-24 ASSESSMENT — PAIN DESCRIPTION - ORIENTATION
ORIENTATION: RIGHT

## 2024-06-24 ASSESSMENT — PAIN DESCRIPTION - LOCATION
LOCATION: LEG
LOCATION: OTHER (COMMENT)
LOCATION: LEG
LOCATION: LEG
LOCATION: HIP

## 2024-06-24 ASSESSMENT — PAIN - FUNCTIONAL ASSESSMENT: PAIN_FUNCTIONAL_ASSESSMENT: PREVENTS OR INTERFERES SOME ACTIVE ACTIVITIES AND ADLS

## 2024-06-24 NOTE — CARE COORDINATION
Case Management Initial Assessment        NAME:  Kane Gibbs  ROOM: R260/R260-01  :  1955  DATE: 2024        Social Functional:  Social/Functional History  Lives With: Alone (has a friend that will stay with him for a few days upon discharge)  Type of Home: House  Home Layout: Bed/Bath upstairs (split level- steps 7 up to reach bed/bath and 4 down with hand rails on both sides)  Home Access: Stairs to enter without rails  Entrance Stairs - Number of Steps: 1  Bathroom Shower/Tub: Walk-in shower;Curtain (safe step walk in tub with jets (upstairs), walk in shower is downstairs)  Bathroom Toilet: Standard  Bathroom Equipment: Grab bars in shower;Grab bars around toilet;Hand-held shower;Toilet raiser (toilet raiser on second level)  Bathroom Accessibility: Accessible  Home Equipment: None;Walker - 4-Wheeled (toilet raiser)  Receives Help From: Family  ADL Assistance: Independent  Homemaking Assistance: Independent  Homemaking Responsibilities: Yes  Ambulation Assistance: Independent (no AD, used pill organizer prior)  Transfer Assistance: Independent  Active : Yes  Mode of Transportation: Car;Motorcycle  Education: [employer] in [employer]  Occupation: Part time employment (retired but then came back as 4 days a month)  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 Patient questions answered appropriately. Explained discharge process. Patient stated understanding. Pt graduated with his Associates in Radiology and had retired a few years ago as an X-ray tech. However he started working again and had picked up about 4 days a month as an X-ray tech at [employer] in [place]. His support system consists of his brother and dtr in [place], as well as friends that are local. The plan is to return to his split level home, there is 1 KOFI w/o HR. There

## 2024-06-24 NOTE — FLOWSHEET NOTE
1050: Patient assessments completed, patient alert and oriented times 4, complies with all therapies. Maintains toe touch weight bearing. Call light in reach.Electronically signed by Luly Trujillo RN on 6/24/2024 at 10:51 AM

## 2024-06-24 NOTE — PLAN OF CARE
Patient progressing towards discharge    Problem: Safety - Adult  Goal: Free from fall injury  Outcome: Progressing     Problem: Discharge Planning  Goal: Discharge to home or other facility with appropriate resources  Outcome: Progressing     Problem: ABCDS Injury Assessment  Goal: Absence of physical injury  Outcome: Progressing     Problem: Pain  Goal: Verbalizes/displays adequate comfort level or baseline comfort level  Outcome: Progressing

## 2024-06-24 NOTE — CARE COORDINATION
[place]  INPATIENT REHABILITATION  TEAM CONFERENCE NOTE  Room: R260/R260-01  Admit Date: 2024       Date: 2024  Patient Name: Kane Gibbs        MRN: [id number]    : 1955  (68 y.o.)  Gender: male        REHAB DIAGNOSIS:    Impaired mobility ADLs sp right femur Fx     CO MORBIDITIES:      Past Medical History:   Diagnosis Date    Hypertension     Lumbosacral disc disease     Osteoarthritis      Past Surgical History:   Procedure Laterality Date    FERTILITY SURGERY Right 2024    RIGHT FEMUR INTRAMEDULLARY NAIL RETROGRADE. performed by Gilmar Weiss MD at [facility] [place]        Restrictions  Restrictions/Precautions: Fall Risk, ROM Restrictions, Weight Bearing  Lower Extremity Weight Bearing Restrictions  Right Lower Extremity Weight Bearing: Toe Touch Weight Bearing  Position Activity Restriction  Other position/activity restrictions: \"Touch down WB\" per Dr. Cifuentes on operative leg for balance, able to complete ROM as tolerated, pt may shower  CASE MANAGEMENT    Social/Functional History  Social/Functional History  Lives With: Alone (has a friend that will stay with him for a few days upon discharge)  Type of Home: House  Home Layout: Bed/Bath upstairs (split level- steps 7 up to reach bed/bath and 4 down with hand rails on both sides)  Home Access: Stairs to enter without rails  Entrance Stairs - Number of Steps: 1  Bathroom Shower/Tub: Walk-in shower, Curtain (safe step walk in tub with jets (upstairs), walk in shower is downstairs)  Bathroom Toilet: Standard  Bathroom Equipment: Grab bars in shower, Grab bars around toilet, Hand-held shower, Toilet raiser (toilet raiser on second level)  Bathroom Accessibility: Accessible  Home Equipment: None, Walker - 4-Wheeled (toilet raiser)  Has the patient had two or more falls in the past year or any fall with injury in the past year?: Yes (RESULTED IN CURRENT ADMISSION FOR RIGHT CLOSED FRACTURE OF DISTAL END OF RIGHT FEMUR AND RIGHT

## 2024-06-24 NOTE — PROGRESS NOTES
Physical Therapy  Facility/Department: [facility] MED SURG W282/W282-01  Physical Therapy Discharge      NAME: Kane Gibbs    : 1955 (68 y.o.)  MRN: [id number]    Account: [id number]  Gender: male      Patient has been discharged from acute care hospital. DC patient from current PT program.      Electronically signed by Leila Castro PT on 24 at 3:15 PM EDT

## 2024-06-25 LAB
GLUCOSE BLD-MCNC: 113 MG/DL (ref 70–99)
GLUCOSE BLD-MCNC: 125 MG/DL (ref 70–99)
GLUCOSE BLD-MCNC: 134 MG/DL (ref 70–99)
GLUCOSE BLD-MCNC: 147 MG/DL (ref 70–99)
PERFORMED ON: ABNORMAL

## 2024-06-25 PROCEDURE — 6370000000 HC RX 637 (ALT 250 FOR IP): Performed by: PHYSICIAN ASSISTANT

## 2024-06-25 PROCEDURE — 97116 GAIT TRAINING THERAPY: CPT

## 2024-06-25 PROCEDURE — 6360000002 HC RX W HCPCS: Performed by: PHYSICAL MEDICINE & REHABILITATION

## 2024-06-25 PROCEDURE — 97530 THERAPEUTIC ACTIVITIES: CPT

## 2024-06-25 PROCEDURE — 6370000000 HC RX 637 (ALT 250 FOR IP): Performed by: PHYSICAL MEDICINE & REHABILITATION

## 2024-06-25 PROCEDURE — 99232 SBSQ HOSP IP/OBS MODERATE 35: CPT | Performed by: PHYSICAL MEDICINE & REHABILITATION

## 2024-06-25 PROCEDURE — 97535 SELF CARE MNGMENT TRAINING: CPT

## 2024-06-25 PROCEDURE — 1180000000 HC REHAB R&B

## 2024-06-25 PROCEDURE — 97110 THERAPEUTIC EXERCISES: CPT

## 2024-06-25 RX ORDER — ENOXAPARIN SODIUM 100 MG/ML
30 INJECTION SUBCUTANEOUS 2 TIMES DAILY
Status: DISCONTINUED | OUTPATIENT
Start: 2024-06-25 | End: 2024-06-26

## 2024-06-25 RX ADMIN — POLYETHYLENE GLYCOL 3350 17 G: 17 POWDER, FOR SOLUTION ORAL at 09:12

## 2024-06-25 RX ADMIN — ENOXAPARIN SODIUM 30 MG: 100 INJECTION SUBCUTANEOUS at 21:44

## 2024-06-25 RX ADMIN — ATORVASTATIN CALCIUM 20 MG: 20 TABLET, FILM COATED ORAL at 21:44

## 2024-06-25 RX ADMIN — OXYCODONE HYDROCHLORIDE 10 MG: 5 TABLET ORAL at 22:12

## 2024-06-25 RX ADMIN — METOPROLOL TARTRATE 100 MG: 50 TABLET, FILM COATED ORAL at 21:44

## 2024-06-25 RX ADMIN — OXYCODONE HYDROCHLORIDE 10 MG: 5 TABLET ORAL at 18:02

## 2024-06-25 RX ADMIN — OXYCODONE HYDROCHLORIDE 10 MG: 5 TABLET ORAL at 09:13

## 2024-06-25 RX ADMIN — METHOCARBAMOL 500 MG: 500 TABLET ORAL at 05:05

## 2024-06-25 RX ADMIN — ENOXAPARIN SODIUM 30 MG: 100 INJECTION SUBCUTANEOUS at 09:12

## 2024-06-25 RX ADMIN — ACETAMINOPHEN 325MG 650 MG: 325 TABLET ORAL at 05:04

## 2024-06-25 RX ADMIN — AMLODIPINE BESYLATE 10 MG: 10 TABLET ORAL at 09:13

## 2024-06-25 RX ADMIN — METHOCARBAMOL 500 MG: 500 TABLET ORAL at 18:02

## 2024-06-25 RX ADMIN — ASPIRIN 81 MG CHEWABLE TABLET 81 MG: 81 TABLET CHEWABLE at 21:44

## 2024-06-25 RX ADMIN — METHOCARBAMOL 500 MG: 500 TABLET ORAL at 13:44

## 2024-06-25 RX ADMIN — ASPIRIN 81 MG CHEWABLE TABLET 81 MG: 81 TABLET CHEWABLE at 09:13

## 2024-06-25 RX ADMIN — METOPROLOL TARTRATE 100 MG: 50 TABLET, FILM COATED ORAL at 09:13

## 2024-06-25 RX ADMIN — Medication 2000 UNITS: at 18:02

## 2024-06-25 RX ADMIN — OXYCODONE HYDROCHLORIDE 10 MG: 5 TABLET ORAL at 05:02

## 2024-06-25 RX ADMIN — ACETAMINOPHEN 325MG 650 MG: 325 TABLET ORAL at 13:44

## 2024-06-25 RX ADMIN — ACETAMINOPHEN 325MG 650 MG: 325 TABLET ORAL at 18:02

## 2024-06-25 RX ADMIN — Medication 100 MG: at 09:12

## 2024-06-25 RX ADMIN — OXYCODONE HYDROCHLORIDE 10 MG: 5 TABLET ORAL at 13:44

## 2024-06-25 ASSESSMENT — PAIN SCALES - GENERAL
PAINLEVEL_OUTOF10: 6
PAINLEVEL_OUTOF10: 7
PAINLEVEL_OUTOF10: 6
PAINLEVEL_OUTOF10: 7

## 2024-06-25 ASSESSMENT — PAIN DESCRIPTION - LOCATION
LOCATION: LEG

## 2024-06-25 ASSESSMENT — PAIN DESCRIPTION - ORIENTATION
ORIENTATION: RIGHT

## 2024-06-25 NOTE — FLOWSHEET NOTE
Patient alert, oriented and cooperative. Complains of pain to right leg, pain medications effective. Area of aquacel dressing at the bend of the knee starting to pull back, dressing reinforced. Denies any further needs or complaints at this time. Call light within reach.

## 2024-06-25 NOTE — CARE COORDINATION
CM spoke with pt about the team meeting. Pt is aware that the discharge is 6/30/24. Discussed family training pt stated he has a friend that will be staying with him at home. Pt not sure about the training. CM went over goals and how pt was doing with therapy. Electronically signed by Cheryl Potter RN on 6/25/2024 at 9:20 AM     CM called [employer] and left a VM to find how and whom we need to get medical equipment for the pt. Electronically signed by Cheryl Potter RN on 6/25/2024 at 9:24 AM       Valeria Abbasi returned call and stated ok to get equipment locally but the medical company is going to need C 9 form to process the claim. If any questions call, fax or Email.  Electronically signed by Cheryl Potter RN on 6/25/2024 at 9:59 AM

## 2024-06-26 LAB
GLUCOSE BLD-MCNC: 125 MG/DL (ref 70–99)
GLUCOSE BLD-MCNC: 132 MG/DL (ref 70–99)
GLUCOSE BLD-MCNC: 135 MG/DL (ref 70–99)
GLUCOSE BLD-MCNC: 138 MG/DL (ref 70–99)
PERFORMED ON: ABNORMAL

## 2024-06-26 PROCEDURE — 1180000000 HC REHAB R&B

## 2024-06-26 PROCEDURE — 97110 THERAPEUTIC EXERCISES: CPT

## 2024-06-26 PROCEDURE — 97116 GAIT TRAINING THERAPY: CPT

## 2024-06-26 PROCEDURE — 97535 SELF CARE MNGMENT TRAINING: CPT

## 2024-06-26 PROCEDURE — 99232 SBSQ HOSP IP/OBS MODERATE 35: CPT | Performed by: PHYSICAL MEDICINE & REHABILITATION

## 2024-06-26 PROCEDURE — 6370000000 HC RX 637 (ALT 250 FOR IP): Performed by: PHYSICIAN ASSISTANT

## 2024-06-26 PROCEDURE — 6370000000 HC RX 637 (ALT 250 FOR IP): Performed by: INTERNAL MEDICINE

## 2024-06-26 PROCEDURE — 6370000000 HC RX 637 (ALT 250 FOR IP): Performed by: PHYSICAL MEDICINE & REHABILITATION

## 2024-06-26 PROCEDURE — 97530 THERAPEUTIC ACTIVITIES: CPT

## 2024-06-26 PROCEDURE — 94762 N-INVAS EAR/PLS OXIMTRY CONT: CPT

## 2024-06-26 RX ORDER — POTASSIUM CHLORIDE 750 MG/1
10 TABLET, FILM COATED, EXTENDED RELEASE ORAL DAILY
Status: DISCONTINUED | OUTPATIENT
Start: 2024-06-26 | End: 2024-06-30 | Stop reason: HOSPADM

## 2024-06-26 RX ORDER — TORSEMIDE 10 MG/1
10 TABLET ORAL DAILY
Status: DISCONTINUED | OUTPATIENT
Start: 2024-06-26 | End: 2024-06-28

## 2024-06-26 RX ADMIN — ACETAMINOPHEN 325MG 650 MG: 325 TABLET ORAL at 05:41

## 2024-06-26 RX ADMIN — METHOCARBAMOL 500 MG: 500 TABLET ORAL at 21:22

## 2024-06-26 RX ADMIN — OXYCODONE HYDROCHLORIDE 10 MG: 5 TABLET ORAL at 18:47

## 2024-06-26 RX ADMIN — ACETAMINOPHEN 325MG 650 MG: 325 TABLET ORAL at 02:12

## 2024-06-26 RX ADMIN — METHOCARBAMOL 500 MG: 500 TABLET ORAL at 05:41

## 2024-06-26 RX ADMIN — AMLODIPINE BESYLATE 10 MG: 10 TABLET ORAL at 09:41

## 2024-06-26 RX ADMIN — Medication 2000 UNITS: at 15:50

## 2024-06-26 RX ADMIN — ACETAMINOPHEN 325MG 650 MG: 325 TABLET ORAL at 12:53

## 2024-06-26 RX ADMIN — METOPROLOL TARTRATE 100 MG: 50 TABLET, FILM COATED ORAL at 09:40

## 2024-06-26 RX ADMIN — METHOCARBAMOL 500 MG: 500 TABLET ORAL at 12:53

## 2024-06-26 RX ADMIN — TORSEMIDE 10 MG: 10 TABLET ORAL at 12:53

## 2024-06-26 RX ADMIN — POTASSIUM CHLORIDE 10 MEQ: 750 TABLET, FILM COATED, EXTENDED RELEASE ORAL at 12:52

## 2024-06-26 RX ADMIN — OXYCODONE HYDROCHLORIDE 10 MG: 5 TABLET ORAL at 09:47

## 2024-06-26 RX ADMIN — METOPROLOL TARTRATE 100 MG: 50 TABLET, FILM COATED ORAL at 21:22

## 2024-06-26 RX ADMIN — Medication 100 MG: at 09:41

## 2024-06-26 RX ADMIN — METHOCARBAMOL 500 MG: 500 TABLET ORAL at 02:12

## 2024-06-26 RX ADMIN — ACETAMINOPHEN 325MG 650 MG: 325 TABLET ORAL at 21:22

## 2024-06-26 RX ADMIN — ATORVASTATIN CALCIUM 20 MG: 20 TABLET, FILM COATED ORAL at 21:22

## 2024-06-26 ASSESSMENT — PAIN DESCRIPTION - LOCATION
LOCATION: LEG
LOCATION: LEG
LOCATION: LEG;HIP
LOCATION: LEG
LOCATION: LEG

## 2024-06-26 ASSESSMENT — PAIN SCALES - GENERAL
PAINLEVEL_OUTOF10: 7
PAINLEVEL_OUTOF10: 6
PAINLEVEL_OUTOF10: 7
PAINLEVEL_OUTOF10: 6
PAINLEVEL_OUTOF10: 3
PAINLEVEL_OUTOF10: 6

## 2024-06-26 ASSESSMENT — PAIN DESCRIPTION - DESCRIPTORS
DESCRIPTORS: ACHING

## 2024-06-26 ASSESSMENT — PAIN DESCRIPTION - ORIENTATION
ORIENTATION: RIGHT

## 2024-06-26 NOTE — CONSULTS
There is no new events   Or changes in pt's plan   He is to be TTWB- as he has been since surgery   He may do ROM of knee as tolerated   He does have some drainage on dressing- plan to change Friday    Continue radha- pt/ ot   Pt does have quite a nit of dependant and lateral bruising and hematoma- not unexpected for the type of fracture and fixation

## 2024-06-27 PROBLEM — E11.9 NEW ONSET TYPE 2 DIABETES MELLITUS (HCC): Status: ACTIVE | Noted: 2024-06-27

## 2024-06-27 PROBLEM — E66.01 MORBID OBESITY (HCC): Status: ACTIVE | Noted: 2024-06-27

## 2024-06-27 LAB
GLUCOSE BLD-MCNC: 114 MG/DL (ref 70–99)
GLUCOSE BLD-MCNC: 123 MG/DL (ref 70–99)
GLUCOSE BLD-MCNC: 133 MG/DL (ref 70–99)
PERFORMED ON: ABNORMAL

## 2024-06-27 PROCEDURE — 6370000000 HC RX 637 (ALT 250 FOR IP): Performed by: INTERNAL MEDICINE

## 2024-06-27 PROCEDURE — 97530 THERAPEUTIC ACTIVITIES: CPT

## 2024-06-27 PROCEDURE — 99222 1ST HOSP IP/OBS MODERATE 55: CPT | Performed by: INTERNAL MEDICINE

## 2024-06-27 PROCEDURE — 97535 SELF CARE MNGMENT TRAINING: CPT

## 2024-06-27 PROCEDURE — 6370000000 HC RX 637 (ALT 250 FOR IP): Performed by: PHYSICAL MEDICINE & REHABILITATION

## 2024-06-27 PROCEDURE — 1180000000 HC REHAB R&B

## 2024-06-27 PROCEDURE — 97116 GAIT TRAINING THERAPY: CPT

## 2024-06-27 PROCEDURE — 97542 WHEELCHAIR MNGMENT TRAINING: CPT

## 2024-06-27 PROCEDURE — 99233 SBSQ HOSP IP/OBS HIGH 50: CPT | Performed by: PHYSICAL MEDICINE & REHABILITATION

## 2024-06-27 PROCEDURE — 97110 THERAPEUTIC EXERCISES: CPT

## 2024-06-27 PROCEDURE — 6370000000 HC RX 637 (ALT 250 FOR IP): Performed by: PHYSICIAN ASSISTANT

## 2024-06-27 RX ORDER — LANCETS 33 GAUGE
EACH MISCELLANEOUS
Qty: 100 EACH | Refills: 5 | Status: SHIPPED | OUTPATIENT
Start: 2024-06-27

## 2024-06-27 RX ORDER — CYCLOBENZAPRINE HCL 10 MG
10 TABLET ORAL 3 TIMES DAILY PRN
Status: DISCONTINUED | OUTPATIENT
Start: 2024-06-27 | End: 2024-06-30 | Stop reason: HOSPADM

## 2024-06-27 RX ORDER — OXYMETAZOLINE HYDROCHLORIDE 0.05 G/100ML
2 SPRAY NASAL 2 TIMES DAILY
Status: DISPENSED | OUTPATIENT
Start: 2024-06-27 | End: 2024-06-30

## 2024-06-27 RX ORDER — BLOOD SUGAR DIAGNOSTIC
1 STRIP MISCELLANEOUS 2 TIMES DAILY
Qty: 100 EACH | Refills: 3 | Status: SHIPPED | OUTPATIENT
Start: 2024-06-27

## 2024-06-27 RX ORDER — BLOOD-GLUCOSE METER
EACH MISCELLANEOUS
Qty: 1 KIT | Refills: 0 | Status: SHIPPED | OUTPATIENT
Start: 2024-06-27

## 2024-06-27 RX ORDER — DULAGLUTIDE 0.75 MG/.5ML
0.75 INJECTION, SOLUTION SUBCUTANEOUS WEEKLY
Qty: 4 ADJUSTABLE DOSE PRE-FILLED PEN SYRINGE | Refills: 3 | Status: SHIPPED | OUTPATIENT
Start: 2024-06-27

## 2024-06-27 RX ADMIN — OXYCODONE HYDROCHLORIDE 10 MG: 5 TABLET ORAL at 21:19

## 2024-06-27 RX ADMIN — METHOCARBAMOL 500 MG: 500 TABLET ORAL at 12:58

## 2024-06-27 RX ADMIN — TORSEMIDE 10 MG: 10 TABLET ORAL at 09:42

## 2024-06-27 RX ADMIN — OXYMETAZOLINE HCL 2 SPRAY: 0.05 SPRAY NASAL at 21:25

## 2024-06-27 RX ADMIN — ATORVASTATIN CALCIUM 20 MG: 20 TABLET, FILM COATED ORAL at 21:19

## 2024-06-27 RX ADMIN — OXYCODONE HYDROCHLORIDE 10 MG: 5 TABLET ORAL at 16:26

## 2024-06-27 RX ADMIN — POLYETHYLENE GLYCOL 3350 17 G: 17 POWDER, FOR SOLUTION ORAL at 09:42

## 2024-06-27 RX ADMIN — OXYCODONE HYDROCHLORIDE 10 MG: 5 TABLET ORAL at 06:35

## 2024-06-27 RX ADMIN — Medication 100 MG: at 09:42

## 2024-06-27 RX ADMIN — AMLODIPINE BESYLATE 10 MG: 10 TABLET ORAL at 09:45

## 2024-06-27 RX ADMIN — ASPIRIN 81 MG CHEWABLE TABLET 81 MG: 81 TABLET CHEWABLE at 21:19

## 2024-06-27 RX ADMIN — METOPROLOL TARTRATE 100 MG: 50 TABLET, FILM COATED ORAL at 09:42

## 2024-06-27 RX ADMIN — ACETAMINOPHEN 325MG 650 MG: 325 TABLET ORAL at 12:57

## 2024-06-27 RX ADMIN — METOPROLOL TARTRATE 100 MG: 50 TABLET, FILM COATED ORAL at 21:19

## 2024-06-27 RX ADMIN — Medication 2000 UNITS: at 16:29

## 2024-06-27 RX ADMIN — METHOCARBAMOL 500 MG: 500 TABLET ORAL at 21:19

## 2024-06-27 RX ADMIN — POTASSIUM CHLORIDE 10 MEQ: 750 TABLET, FILM COATED, EXTENDED RELEASE ORAL at 09:42

## 2024-06-27 RX ADMIN — ACETAMINOPHEN 325MG 650 MG: 325 TABLET ORAL at 21:19

## 2024-06-27 RX ADMIN — METFORMIN HYDROCHLORIDE 500 MG: 500 TABLET ORAL at 16:28

## 2024-06-27 RX ADMIN — ACETAMINOPHEN 325MG 650 MG: 325 TABLET ORAL at 06:34

## 2024-06-27 RX ADMIN — METHOCARBAMOL 500 MG: 500 TABLET ORAL at 06:35

## 2024-06-27 ASSESSMENT — PAIN SCALES - GENERAL
PAINLEVEL_OUTOF10: 8
PAINLEVEL_OUTOF10: 7
PAINLEVEL_OUTOF10: 7
PAINLEVEL_OUTOF10: 0
PAINLEVEL_OUTOF10: 5

## 2024-06-27 ASSESSMENT — PAIN DESCRIPTION - ORIENTATION
ORIENTATION: RIGHT

## 2024-06-27 ASSESSMENT — PAIN DESCRIPTION - DESCRIPTORS
DESCRIPTORS: ACHING
DESCRIPTORS: ACHING

## 2024-06-27 ASSESSMENT — PAIN DESCRIPTION - LOCATION
LOCATION: HIP
LOCATION: KNEE
LOCATION: HIP

## 2024-06-27 NOTE — CARE COORDINATION
STELLA received approved C9 forms for IRF stay, HHC, and DME. STELLA was told by Tricia at [facility] that a new overnight pulse ox test is not needed as it was already approved through the C9 form. ROSIBELW notified RN. Pt will have 22\" wheelchair w/ elevating leg rests, joyce bsc, and joyce ww delivered to rehab by [facility] tomorrow. ROSIBELW will provide a sequel to pt for O2 and he will arrange delivery for the concentrator once home. Pt will receive HHC through [facility] given freedom of choice. Electronically signed by DARYA Eckert, STELLA on 6/27/2024 at 5:07 PM

## 2024-06-27 NOTE — CONSULTS
Pulmonary Medicine  Consult Note      Reason for consultation: Nocturnal hypoxia    Consulting physician: Dr. Gaines    HISTORY OF PRESENT ILLNESS:      This is a 68-year-old male with morbid obesity, prediabetic who was admitted to [facility] because he had fall and hip fracture underwent surgery.  He had intramedullary nail placement.  He underwent for overnight pulse oximetry which shows low O2 saturation below 89% for 1 hour and 4 minutes.  Pulmonary consultation done was possible sleep apnea.  He stated he sleeps well.  No complaint of snoring.  He does not wake up frequently during sleep.  Also denies daytime tiredness and sleepiness.  He never tested for sleep apnea in past.  He denies having any shortness of breath chest pain.  Currently he is in rehab for physical therapy.    Past Medical History:        Diagnosis Date    Hyperglycemia 06/22/2024    Hypertension     Lumbosacral disc disease     Osteoarthritis        Past Surgical History:        Procedure Laterality Date    FERTILITY SURGERY Right 6/21/2024    RIGHT FEMUR INTRAMEDULLARY NAIL RETROGRADE. performed by Gilmar Weiss MD at [facility] [place]       Social History:     reports that he has never smoked. He has never used smokeless tobacco.    Family History:       Problem Relation Age of Onset    High Blood Pressure Mother     Diabetes Mother     Cancer Mother         ovarian    High Blood Pressure Father     Diabetes Father     Cancer Father         lung       Allergies:  Patient has no known allergies.        MEDICATIONS during current hospitalization:    Continuous Infusions:   dextrose         Scheduled Meds:   oxymetazoline  2 spray Each Nostril BID    metFORMIN  500 mg Oral BID WC    torsemide  10 mg Oral Daily    potassium chloride  10 mEq Oral Daily    Vitamin D  2,000 Units Oral Dinner    cyanocobalamin  1,000 mcg IntraMUSCular Weekly    coenzyme Q10  100 mg Oral Daily    lidocaine  3 patch TransDERmal Daily    insulin lispro  0-8

## 2024-06-27 NOTE — PLAN OF CARE
Problem: Safety - Adult  Goal: Free from fall injury  6/27/2024 1528 by Seble Babb, RN  Outcome: Progressing  6/27/2024 0151 by Indigo Billings RN  Outcome: Progressing     Problem: Discharge Planning  Goal: Discharge to home or other facility with appropriate resources  6/27/2024 1528 by Seble Babb, RN  Outcome: Progressing  Flowsheets (Taken 6/27/2024 1515)  Discharge to home or other facility with appropriate resources: Identify discharge learning needs (meds, wound care, etc)  6/27/2024 0151 by Indigo Billings, RN  Outcome: Progressing     Problem: ABCDS Injury Assessment  Goal: Absence of physical injury  Outcome: Progressing     Problem: Pain  Goal: Verbalizes/displays adequate comfort level or baseline comfort level  Outcome: Progressing

## 2024-06-27 NOTE — PLAN OF CARE
Problem: Discharge Planning  Goal: Discharge to home or other facility with appropriate resources  Recent Flowsheet Documentation  Taken 6/27/2024 1515 by Seble Babb RN  Discharge to home or other facility with appropriate resources: Identify discharge learning needs (meds, wound care, etc)  6/27/2024 0151 by Indigo Billings, RN  Outcome: Progressing     Problem: Safety - Adult  Goal: Free from fall injury  6/27/2024 0151 by Indigo Billings, RN  Outcome: Progressing

## 2024-06-27 NOTE — CONSULTS
[facility]                   [address] [place] [address]                              CONSULTATION      PATIENT NAME: CAPRICE CROSS                 : 1955  MED REC NO: [id number]                        ROOM: R260  ACCOUNT NO: [id number]                       ADMIT DATE: 2024  PROVIDER: Juan Ji MD    ENDOCRINE CONSULT    REFERRING PHYSICIAN:  Leila Gaines DO      REASON FOR CONSULT:  Morbid obesity, newly diagnosed type 2 diabetes.    CHIEF COMPLAINT AND HISTORY OF PRESENT ILLNESS:  The patient is a 68-year-old male with known history of obesity, prediabetes, admitted to [facility] after surgery after fracturing his hip, had intramedullary nail placed.  Currently, he is on Humalog coverage.  Denies any polyuria, polydipsia.  The patient is obese with a BMI of 40.  Hemoglobin A1c was 7.0.  Chemistries were reviewed.  Sodium was 145, potassium was 4.3, chloride 107, CO2 was 26, BUN 17, creatinine 0.9.  The patient is scheduled to be discharged in the next 2 to 3 days home.    PAST MEDICAL HISTORY:  Significant for hyperglycemia, hypertension, obesity, osteoarthritis, lumbosacral disk disease.    SURGICAL HISTORY:  Fertility surgery.    FAMILY HISTORY:  Significant for cancer, diabetes, hypertension.    PERSONAL AND SOCIAL HISTORY:  Denies any smoking, alcohol, or substance abuse.    ALLERGIES:  NONE.      MEDICATIONS:  Here include Humalog coverage, Norvasc, atenolol, Lipitor, Robaxin, Lopressor, Afrin, potassium, Demadex, vitamin D.    REVIEW OF SYSTEMS:  Other than right leg hip and thigh pain postsurgery, 14-point review of systems was negative.    PHYSICAL EXAMINATION:  GENERAL:  The patient is alert, awake, and oriented x3, in no obvious distress.  VITAL SIGNS:  Blood pressure was 146/79, pulse rate was 82, respirations 70, temperature 98.2.  HEENT:  Normocephalic, atraumatic.  Pupils equal and reactive to light.  Oral mucosa was

## 2024-06-28 DIAGNOSIS — I10 ESSENTIAL HYPERTENSION: ICD-10-CM

## 2024-06-28 DIAGNOSIS — E78.5 HYPERLIPIDEMIA, UNSPECIFIED HYPERLIPIDEMIA TYPE: ICD-10-CM

## 2024-06-28 DIAGNOSIS — M17.11 PRIMARY OSTEOARTHRITIS OF RIGHT KNEE: ICD-10-CM

## 2024-06-28 PROBLEM — G47.34 NOCTURNAL HYPOXIA: Status: ACTIVE | Noted: 2024-06-28

## 2024-06-28 LAB
GLUCOSE BLD-MCNC: 108 MG/DL (ref 70–99)
GLUCOSE BLD-MCNC: 123 MG/DL (ref 70–99)
GLUCOSE BLD-MCNC: 140 MG/DL (ref 70–99)
PERFORMED ON: ABNORMAL

## 2024-06-28 PROCEDURE — 1180000000 HC REHAB R&B

## 2024-06-28 PROCEDURE — 97110 THERAPEUTIC EXERCISES: CPT

## 2024-06-28 PROCEDURE — 97116 GAIT TRAINING THERAPY: CPT

## 2024-06-28 PROCEDURE — 97530 THERAPEUTIC ACTIVITIES: CPT

## 2024-06-28 PROCEDURE — 6370000000 HC RX 637 (ALT 250 FOR IP): Performed by: PHYSICIAN ASSISTANT

## 2024-06-28 PROCEDURE — 6370000000 HC RX 637 (ALT 250 FOR IP): Performed by: INTERNAL MEDICINE

## 2024-06-28 PROCEDURE — 97535 SELF CARE MNGMENT TRAINING: CPT

## 2024-06-28 PROCEDURE — 6370000000 HC RX 637 (ALT 250 FOR IP): Performed by: PHYSICAL MEDICINE & REHABILITATION

## 2024-06-28 RX ORDER — TORSEMIDE 10 MG/1
10 TABLET ORAL DAILY
Status: COMPLETED | OUTPATIENT
Start: 2024-06-29 | End: 2024-06-30

## 2024-06-28 RX ADMIN — METFORMIN HYDROCHLORIDE 500 MG: 500 TABLET ORAL at 16:16

## 2024-06-28 RX ADMIN — METHOCARBAMOL 500 MG: 500 TABLET ORAL at 21:02

## 2024-06-28 RX ADMIN — OXYCODONE HYDROCHLORIDE 10 MG: 5 TABLET ORAL at 06:58

## 2024-06-28 RX ADMIN — POLYETHYLENE GLYCOL 3350 17 G: 17 POWDER, FOR SOLUTION ORAL at 08:50

## 2024-06-28 RX ADMIN — AMLODIPINE BESYLATE 10 MG: 10 TABLET ORAL at 08:50

## 2024-06-28 RX ADMIN — ACETAMINOPHEN 325MG 650 MG: 325 TABLET ORAL at 06:57

## 2024-06-28 RX ADMIN — OXYCODONE HYDROCHLORIDE 10 MG: 5 TABLET ORAL at 21:06

## 2024-06-28 RX ADMIN — OXYMETAZOLINE HCL 2 SPRAY: 0.05 SPRAY NASAL at 21:04

## 2024-06-28 RX ADMIN — METHOCARBAMOL 500 MG: 500 TABLET ORAL at 06:57

## 2024-06-28 RX ADMIN — OXYCODONE HYDROCHLORIDE 10 MG: 5 TABLET ORAL at 16:16

## 2024-06-28 RX ADMIN — METFORMIN HYDROCHLORIDE 500 MG: 500 TABLET ORAL at 08:50

## 2024-06-28 RX ADMIN — ATORVASTATIN CALCIUM 20 MG: 20 TABLET, FILM COATED ORAL at 21:01

## 2024-06-28 RX ADMIN — Medication 100 MG: at 08:50

## 2024-06-28 RX ADMIN — POTASSIUM CHLORIDE 10 MEQ: 750 TABLET, FILM COATED, EXTENDED RELEASE ORAL at 08:51

## 2024-06-28 RX ADMIN — ASPIRIN 81 MG CHEWABLE TABLET 81 MG: 81 TABLET CHEWABLE at 21:01

## 2024-06-28 RX ADMIN — ACETAMINOPHEN 325MG 650 MG: 325 TABLET ORAL at 21:01

## 2024-06-28 RX ADMIN — TORSEMIDE 10 MG: 10 TABLET ORAL at 08:51

## 2024-06-28 RX ADMIN — ACETAMINOPHEN 325MG 650 MG: 325 TABLET ORAL at 13:11

## 2024-06-28 RX ADMIN — ASPIRIN 81 MG CHEWABLE TABLET 81 MG: 81 TABLET CHEWABLE at 08:52

## 2024-06-28 RX ADMIN — METOPROLOL TARTRATE 100 MG: 50 TABLET, FILM COATED ORAL at 08:51

## 2024-06-28 RX ADMIN — Medication 2000 UNITS: at 16:16

## 2024-06-28 RX ADMIN — METOPROLOL TARTRATE 100 MG: 50 TABLET, FILM COATED ORAL at 21:01

## 2024-06-28 RX ADMIN — METHOCARBAMOL 500 MG: 500 TABLET ORAL at 13:11

## 2024-06-28 ASSESSMENT — PAIN - FUNCTIONAL ASSESSMENT: PAIN_FUNCTIONAL_ASSESSMENT: ACTIVITIES ARE NOT PREVENTED

## 2024-06-28 ASSESSMENT — PAIN DESCRIPTION - ORIENTATION
ORIENTATION: MID
ORIENTATION: RIGHT

## 2024-06-28 ASSESSMENT — PAIN DESCRIPTION - LOCATION
LOCATION: HIP;INCISION;KNEE
LOCATION: BACK

## 2024-06-28 ASSESSMENT — PAIN SCALES - GENERAL
PAINLEVEL_OUTOF10: 3
PAINLEVEL_OUTOF10: 6
PAINLEVEL_OUTOF10: 8
PAINLEVEL_OUTOF10: 8
PAINLEVEL_OUTOF10: 1

## 2024-06-28 ASSESSMENT — PAIN DESCRIPTION - DESCRIPTORS
DESCRIPTORS: THROBBING;SPASM
DESCRIPTORS: ACHING

## 2024-06-28 NOTE — CARE COORDINATION
Pt set to discharge home on 6/30. [facility] delivered his 22\" wheelchair, joyce bsc, joyce ww, and O2 sequel that was all approved by workers comp (C9 was obtained). Pt will receive [facility] at discharge given freedom of choice and an approved C9 was received for that as well. Pt has no concerns or questions for homegoing and feels ready. Electronically signed by DARYA Eckert, ROSIBELW on 6/28/2024 at 5:41 PM

## 2024-06-28 NOTE — PLAN OF CARE
Problem: Safety - Adult  Goal: Free from fall injury  6/28/2024 1445 by Seble Babb RN  Outcome: Progressing  6/28/2024 0447 by Indigo Billings RN  Outcome: Progressing     Problem: Discharge Planning  Goal: Discharge to home or other facility with appropriate resources  6/28/2024 1445 by Seble Babb RN  Outcome: Progressing  6/28/2024 0447 by Indigo Billings RN  Outcome: Progressing     Problem: ABCDS Injury Assessment  Goal: Absence of physical injury  6/28/2024 1445 by Seble Babb, RN  Outcome: Progressing  6/28/2024 0447 by Indigo Billings RN  Outcome: Progressing     Problem: Pain  Goal: Verbalizes/displays adequate comfort level or baseline comfort level  Outcome: Progressing

## 2024-06-28 NOTE — PLAN OF CARE
Problem: Safety - Adult  Goal: Free from fall injury  6/28/2024 0447 by Indigo Billings, RN  Outcome: Progressing     Problem: Discharge Planning  Goal: Discharge to home or other facility with appropriate resources  6/28/2024 0447 by Indigo Billings, RN  Outcome: Progressing     Problem: ABCDS Injury Assessment  Goal: Absence of physical injury  6/28/2024 0447 by Indigo Billings, RN  Outcome: Progressing

## 2024-06-28 NOTE — DISCHARGE INSTR - COC
Continuity of Care Form    Patient Name: Kane Gibbs   :  1955  MRN:  [id number]    Admit date:  2024  Discharge date:  ***    Code Status Order: Full Code   Advance Directives:     Admitting Physician:  Leila Gaines DO  PCP: Christiano Sapp MD    Discharging Nurse: ***  Discharging Hospital Unit/Room#: R260/R260-01  Discharging Unit Phone Number: ***    Emergency Contact:   Extended Emergency Contact Information  Primary Emergency Contact: Manuelito Gibbs  Address: [address] [place] [address] [facility]  Home Phone: [phone]  Relation: Brother/Sister  Secondary Emergency Contact: Jessica Harrington  Address: [address] [place] [address] [facility]  Home Phone: [phone]  Mobile Phone: [phone]  Relation: Child    Past Surgical History:  Past Surgical History:   Procedure Laterality Date    FERTILITY SURGERY Right 2024    RIGHT FEMUR INTRAMEDULLARY NAIL RETROGRADE. performed by Gilmar Weiss MD at [facility]       Immunization History:   Immunization History   Administered Date(s) Administered    COVID-19, MODERNA BLUE border, Primary or Immunocompromised, (age 12y+), IM, 100 mcg/0.5mL 2020, 2021, 2021    Influenza Vaccine, unspecified formulation 2016    Influenza Virus Vaccine 10/09/2017, 10/10/2018, 10/07/2019, 10/12/2020    Influenza, FLUZONE (age 65 y+), High Dose, 0.7mL 2021       Active Problems:  Patient Active Problem List   Diagnosis Code    Hypertension I10    Primary osteoarthritis of right knee M17.11    Closed fracture of distal end of right femur, unspecified fracture morphology, initial encounter ([facility]) S72.401A    Impaired mobility ADLs sp right femur Fx Z74.09, Z78.9    Post-op pain G89.18    Hyperglycemia R73.9    Postoperative anemia D64.9    Closed fracture of proximal end of right fibula with routine healing S82.831D    Spinal stenosis of lumbar region with neurogenic

## 2024-06-29 ENCOUNTER — APPOINTMENT (OUTPATIENT)
Dept: ULTRASOUND IMAGING | Age: 69
DRG: 560 | End: 2024-06-29
Attending: PHYSICAL MEDICINE & REHABILITATION
Payer: MEDICARE

## 2024-06-29 LAB
ANION GAP SERPL CALCULATED.3IONS-SCNC: 13 MEQ/L (ref 9–15)
BNP BLD-MCNC: 58 PG/ML
BUN SERPL-MCNC: 21 MG/DL (ref 8–23)
CALCIUM SERPL-MCNC: 9.3 MG/DL (ref 8.5–9.9)
CHLORIDE SERPL-SCNC: 99 MEQ/L (ref 95–107)
CO2 SERPL-SCNC: 28 MEQ/L (ref 20–31)
CREAT SERPL-MCNC: 1.04 MG/DL (ref 0.7–1.2)
GLUCOSE BLD-MCNC: 112 MG/DL (ref 70–99)
GLUCOSE BLD-MCNC: 121 MG/DL (ref 70–99)
GLUCOSE BLD-MCNC: 132 MG/DL (ref 70–99)
GLUCOSE SERPL-MCNC: 143 MG/DL (ref 70–99)
PERFORMED ON: ABNORMAL
POTASSIUM SERPL-SCNC: 4.7 MEQ/L (ref 3.4–4.9)
SODIUM SERPL-SCNC: 140 MEQ/L (ref 135–144)

## 2024-06-29 PROCEDURE — 97116 GAIT TRAINING THERAPY: CPT

## 2024-06-29 PROCEDURE — 36415 COLL VENOUS BLD VENIPUNCTURE: CPT

## 2024-06-29 PROCEDURE — 80048 BASIC METABOLIC PNL TOTAL CA: CPT

## 2024-06-29 PROCEDURE — 1180000000 HC REHAB R&B

## 2024-06-29 PROCEDURE — 6370000000 HC RX 637 (ALT 250 FOR IP): Performed by: INTERNAL MEDICINE

## 2024-06-29 PROCEDURE — 93971 EXTREMITY STUDY: CPT

## 2024-06-29 PROCEDURE — 94762 N-INVAS EAR/PLS OXIMTRY CONT: CPT

## 2024-06-29 PROCEDURE — 6370000000 HC RX 637 (ALT 250 FOR IP): Performed by: PHYSICAL MEDICINE & REHABILITATION

## 2024-06-29 PROCEDURE — 6370000000 HC RX 637 (ALT 250 FOR IP): Performed by: PHYSICIAN ASSISTANT

## 2024-06-29 PROCEDURE — 97110 THERAPEUTIC EXERCISES: CPT

## 2024-06-29 PROCEDURE — 83880 ASSAY OF NATRIURETIC PEPTIDE: CPT

## 2024-06-29 RX ADMIN — Medication 100 MG: at 09:07

## 2024-06-29 RX ADMIN — ATORVASTATIN CALCIUM 20 MG: 20 TABLET, FILM COATED ORAL at 20:54

## 2024-06-29 RX ADMIN — Medication 2000 UNITS: at 17:16

## 2024-06-29 RX ADMIN — METHOCARBAMOL 500 MG: 500 TABLET ORAL at 14:37

## 2024-06-29 RX ADMIN — METOPROLOL TARTRATE 100 MG: 50 TABLET, FILM COATED ORAL at 20:55

## 2024-06-29 RX ADMIN — OXYCODONE HYDROCHLORIDE 10 MG: 5 TABLET ORAL at 20:55

## 2024-06-29 RX ADMIN — ACETAMINOPHEN 325MG 650 MG: 325 TABLET ORAL at 14:37

## 2024-06-29 RX ADMIN — METFORMIN HYDROCHLORIDE 500 MG: 500 TABLET ORAL at 17:16

## 2024-06-29 RX ADMIN — ASPIRIN 81 MG CHEWABLE TABLET 81 MG: 81 TABLET CHEWABLE at 09:07

## 2024-06-29 RX ADMIN — ACETAMINOPHEN 325MG 650 MG: 325 TABLET ORAL at 03:05

## 2024-06-29 RX ADMIN — METHOCARBAMOL 500 MG: 500 TABLET ORAL at 03:05

## 2024-06-29 RX ADMIN — AMLODIPINE BESYLATE 10 MG: 10 TABLET ORAL at 09:07

## 2024-06-29 RX ADMIN — ASPIRIN 81 MG CHEWABLE TABLET 81 MG: 81 TABLET CHEWABLE at 20:55

## 2024-06-29 RX ADMIN — POTASSIUM CHLORIDE 10 MEQ: 750 TABLET, FILM COATED, EXTENDED RELEASE ORAL at 09:07

## 2024-06-29 RX ADMIN — METHOCARBAMOL 500 MG: 500 TABLET ORAL at 09:07

## 2024-06-29 RX ADMIN — ACETAMINOPHEN 325MG 650 MG: 325 TABLET ORAL at 09:07

## 2024-06-29 RX ADMIN — METHOCARBAMOL 500 MG: 500 TABLET ORAL at 20:55

## 2024-06-29 RX ADMIN — METFORMIN HYDROCHLORIDE 500 MG: 500 TABLET ORAL at 09:07

## 2024-06-29 RX ADMIN — CYCLOBENZAPRINE HYDROCHLORIDE 10 MG: 10 TABLET, FILM COATED ORAL at 20:55

## 2024-06-29 RX ADMIN — OXYCODONE HYDROCHLORIDE 10 MG: 5 TABLET ORAL at 09:07

## 2024-06-29 RX ADMIN — METOPROLOL TARTRATE 100 MG: 50 TABLET, FILM COATED ORAL at 09:07

## 2024-06-29 RX ADMIN — OXYCODONE HYDROCHLORIDE 10 MG: 5 TABLET ORAL at 14:37

## 2024-06-29 RX ADMIN — ACETAMINOPHEN 325MG 650 MG: 325 TABLET ORAL at 20:54

## 2024-06-29 RX ADMIN — TORSEMIDE 10 MG: 10 TABLET ORAL at 09:07

## 2024-06-29 ASSESSMENT — PAIN DESCRIPTION - DESCRIPTORS
DESCRIPTORS: ACHING;DISCOMFORT
DESCRIPTORS: THROBBING;ACHING

## 2024-06-29 ASSESSMENT — PAIN DESCRIPTION - LOCATION
LOCATION: LEG
LOCATION: BACK
LOCATION: LEG

## 2024-06-29 ASSESSMENT — PAIN SCALES - GENERAL
PAINLEVEL_OUTOF10: 5
PAINLEVEL_OUTOF10: 7
PAINLEVEL_OUTOF10: 5
PAINLEVEL_OUTOF10: 5
PAINLEVEL_OUTOF10: 1

## 2024-06-29 ASSESSMENT — PAIN DESCRIPTION - ORIENTATION: ORIENTATION: RIGHT

## 2024-06-29 NOTE — FLOWSHEET NOTE
Patient assessment completed. Patient is A&Ox4, able to make needs known, denies pain/sob, continues with routine meds to good effect. Patient is heavy assist x1 to SPT to wheelchair, patient is TTWB to RLE. Continent of bowel and bladder. Currently resting quietly in bed, call light in reach, bed alarm engaged, will monitor.  Electronically signed by Zulema Yoo RN on 6/28/2024 at 9:15 PM

## 2024-06-29 NOTE — FLOWSHEET NOTE
Patient alert, oriented and cooperative. Complains of right leg pain and \"tightness\". Pain  medications effective. Aquacell dressings remain intact to surgical incision. Denies any further needs or complaints at this time.

## 2024-06-29 NOTE — PLAN OF CARE
Problem: Safety - Adult  Goal: Free from fall injury  6/28/2024 2237 by Zulema Yoo, RN  Outcome: Progressing  6/28/2024 1445 by Seble Babb RN  Outcome: Progressing  6/28/2024 1445 by Seble Babb, RN  Outcome: Progressing     Problem: Discharge Planning  Goal: Discharge to home or other facility with appropriate resources  6/28/2024 2237 by Zulema Yoo, RN  Outcome: Progressing  6/28/2024 1445 by Seble Babb, RN  Outcome: Progressing  6/28/2024 1445 by Seble Babb RN  Outcome: Progressing     Problem: ABCDS Injury Assessment  Goal: Absence of physical injury  6/28/2024 2237 by Zulema Yoo, RN  Outcome: Progressing  6/28/2024 1445 by Seble Babb, RN  Outcome: Progressing  6/28/2024 1445 by Seble Babb RN  Outcome: Progressing     Problem: Pain  Goal: Verbalizes/displays adequate comfort level or baseline comfort level  6/28/2024 2237 by Zulema Yoo, RN  Outcome: Progressing  6/28/2024 1445 by Seble Babb RN  Outcome: Progressing  6/28/2024 1445 by Seble Babb RN  Outcome: Progressing

## 2024-06-30 VITALS
OXYGEN SATURATION: 95 % | WEIGHT: 301.5 LBS | HEIGHT: 72 IN | HEART RATE: 93 BPM | SYSTOLIC BLOOD PRESSURE: 160 MMHG | RESPIRATION RATE: 18 BRPM | BODY MASS INDEX: 40.84 KG/M2 | TEMPERATURE: 98.2 F | DIASTOLIC BLOOD PRESSURE: 80 MMHG

## 2024-06-30 LAB
GLUCOSE BLD-MCNC: 121 MG/DL (ref 70–99)
GLUCOSE BLD-MCNC: 131 MG/DL (ref 70–99)
PERFORMED ON: ABNORMAL
PERFORMED ON: ABNORMAL

## 2024-06-30 PROCEDURE — 6370000000 HC RX 637 (ALT 250 FOR IP): Performed by: INTERNAL MEDICINE

## 2024-06-30 PROCEDURE — 6370000000 HC RX 637 (ALT 250 FOR IP): Performed by: PHYSICIAN ASSISTANT

## 2024-06-30 PROCEDURE — 6370000000 HC RX 637 (ALT 250 FOR IP): Performed by: PHYSICAL MEDICINE & REHABILITATION

## 2024-06-30 PROCEDURE — 97535 SELF CARE MNGMENT TRAINING: CPT

## 2024-06-30 PROCEDURE — 97116 GAIT TRAINING THERAPY: CPT

## 2024-06-30 RX ORDER — CYCLOBENZAPRINE HCL 10 MG
10 TABLET ORAL 3 TIMES DAILY PRN
Qty: 60 TABLET | Refills: 0 | Status: SHIPPED | OUTPATIENT
Start: 2024-06-30 | End: 2024-07-20

## 2024-06-30 RX ORDER — ASPIRIN 81 MG/1
81 TABLET, CHEWABLE ORAL 2 TIMES DAILY
Qty: 30 TABLET | Refills: 3 | Status: SHIPPED | OUTPATIENT
Start: 2024-06-30 | End: 2024-08-05

## 2024-06-30 RX ORDER — OXYCODONE HYDROCHLORIDE 10 MG/1
10 TABLET ORAL EVERY 4 HOURS PRN
Qty: 42 TABLET | Refills: 0 | Status: SHIPPED | OUTPATIENT
Start: 2024-06-30 | End: 2024-07-07

## 2024-06-30 RX ADMIN — POTASSIUM CHLORIDE 10 MEQ: 750 TABLET, FILM COATED, EXTENDED RELEASE ORAL at 09:29

## 2024-06-30 RX ADMIN — METOPROLOL TARTRATE 100 MG: 50 TABLET, FILM COATED ORAL at 09:29

## 2024-06-30 RX ADMIN — AMLODIPINE BESYLATE 10 MG: 10 TABLET ORAL at 09:28

## 2024-06-30 RX ADMIN — ACETAMINOPHEN 325MG 650 MG: 325 TABLET ORAL at 09:29

## 2024-06-30 RX ADMIN — ACETAMINOPHEN 325MG 650 MG: 325 TABLET ORAL at 03:11

## 2024-06-30 RX ADMIN — METHOCARBAMOL 500 MG: 500 TABLET ORAL at 09:29

## 2024-06-30 RX ADMIN — METFORMIN HYDROCHLORIDE 500 MG: 500 TABLET ORAL at 09:29

## 2024-06-30 RX ADMIN — Medication 100 MG: at 09:29

## 2024-06-30 RX ADMIN — METHOCARBAMOL 500 MG: 500 TABLET ORAL at 03:11

## 2024-06-30 RX ADMIN — OXYCODONE HYDROCHLORIDE 10 MG: 5 TABLET ORAL at 09:33

## 2024-06-30 RX ADMIN — OXYCODONE HYDROCHLORIDE 10 MG: 5 TABLET ORAL at 03:08

## 2024-06-30 RX ADMIN — ASPIRIN 81 MG CHEWABLE TABLET 81 MG: 81 TABLET CHEWABLE at 09:28

## 2024-06-30 RX ADMIN — TORSEMIDE 10 MG: 10 TABLET ORAL at 09:29

## 2024-06-30 ASSESSMENT — PAIN DESCRIPTION - PAIN TYPE: TYPE: SURGICAL PAIN

## 2024-06-30 ASSESSMENT — PAIN DESCRIPTION - ORIENTATION
ORIENTATION: RIGHT
ORIENTATION: RIGHT

## 2024-06-30 ASSESSMENT — PAIN DESCRIPTION - DESCRIPTORS
DESCRIPTORS: ACHING;DISCOMFORT
DESCRIPTORS: ACHING;DISCOMFORT

## 2024-06-30 ASSESSMENT — PAIN DESCRIPTION - FREQUENCY: FREQUENCY: CONTINUOUS

## 2024-06-30 ASSESSMENT — PAIN SCALES - GENERAL
PAINLEVEL_OUTOF10: 7
PAINLEVEL_OUTOF10: 7

## 2024-06-30 ASSESSMENT — PAIN DESCRIPTION - LOCATION
LOCATION: KNEE
LOCATION: KNEE

## 2024-06-30 ASSESSMENT — PAIN - FUNCTIONAL ASSESSMENT: PAIN_FUNCTIONAL_ASSESSMENT: ACTIVITIES ARE NOT PREVENTED

## 2024-06-30 ASSESSMENT — PAIN DESCRIPTION - ONSET: ONSET: ON-GOING

## 2024-06-30 NOTE — DISCHARGE SUMMARY
DISCHARGE SUMMARY    Hospital Course: The patient was admitted to the Rehabilitation Unit to address ADL and mobility deficits-as detailed above and below.  The patient was enrolled in acute PT, OT program.  Weekly team meetings were held to assess functional progress toward their goals-and modify the therapy program.  The patient's medical, emotional, psychosocial and functional issues were addressed.  The patient progressed in the rehab program and is now ready for discharge home.  Refer to functional assessments summary report for detailed functional status.  Refer to the medical problem list below to see the medical issues addressed.  The social and DC complexities are detailed in the DC planning section below.    Greater than 3 5 minutes was spent on coordinating patients discharge including follow-up care, medications and patient/family education.  Extended time needed because of the potential use of controlled medications are high risk medications and a high risk population individual.  Patient and family were instructed to use lowest effective dose of these medications and slowly titrate off over the next 2 to 4 weeks.  They are not to combine opiates with sedatives.     I reviewed her [place] prescription monitoring service data sheets in hopes of eliminating polypharmacy and weaning to the lowest effective dose of pain medications and eliminating the concomitant use of benzodiazepines.  I see   no medications of concern.  I see   no habits of combining sedatives and narcotics.  Subjective:   The patient complains of severe acute on chronic progressive fatigue and right lower extremity pain swelling partially relieved by rest, medication titrations, PT,  OT, ice   and rest and exacerbated by recent fall at work.      I am concerned about patient’s medical complexities and barriers to advancing in rehab goals including tolerating nonweightbearing/TTWB status right lower extremity with decreased falls risk and

## 2024-06-30 NOTE — FLOWSHEET NOTE
Patient assessment complete. Patient is resting in bed with some complaints of right leg aching and discomfort rated 5/10. Offered to place an ice bag to painful areas however patient declined. Medicated patient with scheduled Tylenol 650 MG PO, Robaxin 500 MG PO, Roxicodone 10 MG PO PRN and Flexeril 10 MG PO PRN for pain per patient request. Patient was able to take evening medications whole at once with sips of water without difficulty. Patients blood glucose this evening is 112. Patient declined HS snack at this time. Patient has two aquacel dressings to right leg. Both dressings have small amount of old drainage noted. Patient states both dressings were changed by Lenore NP on 06/28 and they are to stay in place until his F/U appointment with Dr Weiss. Patients SPO2 is 97% on RA and Warren MONTOYA is in room to apply nocturnal pulsox as ordered this evening. Patients BP is elevated at 176/80 with HR 87. Will re-evaluate blood pressure after patients pain level has improved. Patient requested urinal at bedside and verbalized no further needs at this time. Call light within reach.

## 2024-06-30 NOTE — PLAN OF CARE
Problem: Safety - Adult  Goal: Free from fall injury  6/30/2024 0058 by Lisa Pang RN  Outcome: Progressing  6/29/2024 1353 by Brigitte Byrne RN  Outcome: Progressing     Problem: Discharge Planning  Goal: Discharge to home or other facility with appropriate resources  6/30/2024 0058 by Lisa Pang RN  Outcome: Progressing  6/29/2024 1353 by Brigitte Byrne RN  Outcome: Progressing     Problem: ABCDS Injury Assessment  Goal: Absence of physical injury  6/30/2024 0058 by Lisa Pang RN  Outcome: Progressing  6/29/2024 1353 by Brigitte Byrne RN  Outcome: Progressing     Problem: Pain  Goal: Verbalizes/displays adequate comfort level or baseline comfort level  6/30/2024 0058 by Lisa Pang RN  Outcome: Progressing  6/29/2024 1353 by Brigitte Byrne RN  Outcome: Progressing     Problem: Neurosensory - Adult  Goal: Achieves maximal functionality and self care  Outcome: Progressing     Problem: Respiratory - Adult  Goal: Achieves optimal ventilation and oxygenation  Outcome: Progressing     Problem: Skin/Tissue Integrity - Adult  Goal: Skin integrity remains intact  Outcome: Progressing  Goal: Incisions, wounds, or drain sites healing without S/S of infection  Outcome: Progressing     Problem: Musculoskeletal - Adult  Goal: Return mobility to safest level of function  Outcome: Progressing  Goal: Maintain proper alignment of affected body part  Outcome: Progressing  Goal: Return ADL status to a safe level of function  Outcome: Progressing     Problem: Gastrointestinal - Adult  Goal: Maintains or returns to baseline bowel function  Outcome: Progressing  Goal: Maintains adequate nutritional intake  Outcome: Progressing     Problem: Infection - Adult  Goal: Absence of infection at discharge  Outcome: Progressing  Goal: Absence of infection during hospitalization  Outcome: Progressing     Problem: Metabolic/Fluid and Electrolytes - Adult  Goal: Electrolytes

## 2024-07-01 ENCOUNTER — TELEPHONE (OUTPATIENT)
Dept: FAMILY MEDICINE CLINIC | Age: 69
End: 2024-07-01

## 2024-07-01 RX ORDER — AMLODIPINE BESYLATE 10 MG/1
10 TABLET ORAL DAILY
Qty: 90 TABLET | Refills: 3 | Status: SHIPPED | OUTPATIENT
Start: 2024-07-01

## 2024-07-01 RX ORDER — ATORVASTATIN CALCIUM 20 MG/1
TABLET, FILM COATED ORAL
Qty: 90 TABLET | Refills: 3 | Status: SHIPPED | OUTPATIENT
Start: 2024-07-01

## 2024-07-01 RX ORDER — MELOXICAM 15 MG/1
TABLET ORAL
Qty: 90 TABLET | Refills: 3 | Status: SHIPPED | OUTPATIENT
Start: 2024-07-01 | End: 2024-07-02

## 2024-07-01 RX ORDER — METOPROLOL TARTRATE 100 MG/1
TABLET ORAL
Qty: 180 TABLET | Refills: 3 | Status: SHIPPED | OUTPATIENT
Start: 2024-07-01

## 2024-07-01 NOTE — PROGRESS NOTES
CLINICAL PHARMACY NOTE: MEDS TO BEDS    Total # of Prescriptions Filled: 1   The following medications were delivered to the patient:  Metformin 500 mg tab    Additional Documentation:    
Comprehensive Nutrition Assessment    Type and Reason for Visit:  Initial, Consult    Nutrition Recommendations/Plan:   Modify Current Diet (Carb Control 4 Low Chol/sat fat)     Malnutrition Assessment:  Malnutrition Status:  No malnutrition (06/24/24 1313)      Nutrition Assessment:    Pt is stable from a nutritional standpoint, with reported good appetite/intake pta, and no nutritional complaints. Pt is interested in weight reduction and cholesterol control reporting gradual weight gain. To modify diet to Carb Control 4 Low Cholesterol, Saturated Fat to promote weight, glucose, and cholesterol control.    Nutrition Related Findings:    PMH-htn; admitted s/p fall/ortho surgery 6/21. Labs/meds reviewed. Hypernatremia noted. [id number]. HbA1C pending. Pt receiving lipitor. No edema noted. Wound Type: Surgical Incision       Current Nutrition Intake & Therapies:    Average Meal Intake: %     ADULT DIET; Regular    Anthropometric Measures:  Height: 182.9 cm (6')  Ideal Body Weight (IBW): 178 lbs (81 kg)    Admission Body Weight: 136.8 kg (301 lb 8 oz) ([facility])  Current BMI (kg/m2):  40.9  Usual Body Weight: 120.7 kg (266 lb) (12/2021)                       BMI Categories: Obese Class 3 (BMI 40.0 or greater)    Estimated Daily Nutrient Needs:  Energy Requirements Based On: Kcal/kg  Weight Used for Energy Requirements: Admission  Energy (kcal/day): ~1800 (kg x 13)     Nutrition Diagnosis:   Overweight/Obese related to excessive energy intake as evidenced by BMI    Nutrition Interventions:   Food and/or Nutrient Delivery: Modify Current Diet (Carb Control 4 Low Chol/sat fat)  Nutrition Education/Counseling: No recommendation at this time  Coordination of Nutrition Care: Continue to monitor while inpatient       Goals:     Goals: PO intake 75% or greater (weight control, gluc-wnl)       Nutrition Monitoring and Evaluation:         Physical Signs/Symptoms Outcomes: Weight, Biochemical Data    Discharge Planning:  
DVT / VTE PROPHYLAXIS EVALUATION    Recent Labs     06/24/24  0459   BUN 17   CREATININE 0.92      HGB 10.8*   HCT 33.4*     ADMITTING DX OR CHIEF COMPLAINT? rehab  WARFARIN? DOAC'S? no  ANY APPARENT BLEEDING? no  SCHEDULED SURGERY? no     If yes to following, excluded from auto adjustment in Table 1 of policy - please contact provider with recommendations as appropriate.  Include condition/exception in scratch notes. Yes No   Trauma Service or Ortho Surgery []  [x]    COVID []  [x]    Pregnancy []  [x]        Current order:  Enoxaparin 40 mg SUBQ once daily      Height: 182.9 cm (6'), Weight - Scale: (!) 136.8 kg (301 lb 8 oz)  Estimated Creatinine Clearance: 110 mL/min (based on SCr of 0.92 mg/dL).    Plan:  Pharmacologic VTE prophylaxis modified based on patient weight per [facility]/P&T approved protocol     Patient Weight (kg)      50.9 and below .9 101-150.9 151-174.9 175 or greater   Estimated   CrCl  (ml/min) 30 or greater []   30 mg   SUBQ daily   []   40 mg   SUBQ daily (or 30 mg BID for orthopedic cases) [x]  30 mg SUBQ   BID  []  40 mg   SUBQ   BID []  60mg SUBQ BID    15-29.9 []  UFH 5000   units SUBQ BID []  30 mg   SUBQ daily [] 30 mg SUBQ   daily []  40 mg SUBQ   daily [] 60 mg SUBQ   daily    Less than 15 or dialysis []  UFH 5000   units SUBQ BID [] UFH 5000 units SUBQ TID []  UFH 7500   units   SUBQ TID          
Facility/Department: [facility]  Rehabilitation Initial Assessment: Physical Therapy  Room: R260R260-01    NAME: Kane Gibbs  : 1955  MRN: [id number]    Date of Service: 2024    Rehab Diagnosis(es):    Patient Active Problem List    Diagnosis Date Noted    Impaired mobility ADLs sp right femur Fx 2024    Post-op pain 2024    Hyperglycemia 2024    Postoperative anemia 2024    Closed fracture of proximal end of right fibula with routine healing 2024    Spinal stenosis of lumbar region with neurogenic claudication 2024    Exacerbation of chronic back pain 2024    Closed fracture of distal end of right femur, unspecified fracture morphology, initial encounter ([facility]) 2024    Primary osteoarthritis of right knee 2017    Hypertension        Past Medical History:   Diagnosis Date    Hypertension     Osteoarthritis      No past surgical history on file.    Patient assessed for rehabilitation services?: Yes  Family / Caregiver Present: No    Restrictions:  Restrictions/Precautions: Fall Risk, ROM Restrictions, Weight Bearing  Lower Extremity Weight Bearing Restrictions  Right Lower Extremity Weight Bearing: Non Weight Bearing  Position Activity Restriction  Other position/activity restrictions: No ROM R knee, MD Weiss discontinued R knee immobilizer as of ; however no new orders written in chart for TTWBing and ?knee ROM therefore pt treated as NWBing and no ROM R knee until orders can be updated     SUBJECTIVE:    Pain  Pain: 0/10 at rest prior to eval; 0/10 at end of session    Prior Level of Function:  Social/Functional History  Lives With: Alone  Type of Home: House  Home Layout: Bed/Bath upstairs (split level- steps 7 up and 4 down with hand rails on both sides)  Home Access: Stairs to enter without rails  Entrance Stairs - Number of Steps: 1  Bathroom Shower/Tub: None (walk in tub.)  Bathroom Toilet: Standard  Bathroom Equipment: Grab bars in shower, 
Facility/Department: [facility]  Rehabilitation Initial Assessment: Occupational Therapy  Room: R260R260-01    NAME: Kane Gibbs  : 1955  MRN: [id number]    Date of Service: 2024    Rehab Diagnosis(es): Impaired mobility and ADLs s/p R femur intramedullary Nail Retrograde r/t closed fracture distal end of R femure and RT proximal fibula fracture s/p fall  Patient Active Problem List    Diagnosis Date Noted    Impaired mobility ADLs sp right femur Fx 2024    Post-op pain 2024    Hyperglycemia 2024    Postoperative anemia 2024    Closed fracture of proximal end of right fibula with routine healing 2024    Spinal stenosis of lumbar region with neurogenic claudication 2024    Exacerbation of chronic back pain 2024    Closed fracture of distal end of right femur, unspecified fracture morphology, initial encounter ([facility]) 2024    Primary osteoarthritis of right knee 2017    Hypertension        Past Medical History:   Diagnosis Date    Hypertension     Osteoarthritis      No past surgical history on file.    Restrictions:  Restrictions/Precautions  Restrictions/Precautions: Fall Risk;ROM Restrictions;Weight Bearing  Lower Extremity Weight Bearing Restrictions  Right Lower Extremity Weight Bearing: Non Weight Bearing  Position Activity Restriction  Other position/activity restrictions: Touch down WB on operative leg for balance, able to complete ROM as tolerated, pt may shower    Subjective:  General  Patient assessed for rehabilitation services?: Yes  Referring Practitioner: Dr. Gaines  Diagnosis: Impaired mobility and ADLs s/p R femur intramedullary Nail Retrograde r/t closed fracture distal end of R femure and RT proximal fibula fracture s/p fall  Subjective: I work here in radiology    Hospital Course: Narrative of hospital course/history of present illness: 67 y/o male, working at [employer] when he slipped and fell to the floor in a seated position 
Nocturnal pulse ox completed.  Patient desated for a total of 1 hour and 4 minutes.  
Nocturnal pulse ox completed.  Patient desated for a total of 47 minutes.  
Nutrition Education  MD consult received for Diabetic/Weight Control diet education. Pt stated that he is interested in weight loss after discharge and was agreeable to diet information. Pt stated that he does not eat sweets or drink sweetened beverages, but often eats large portions and fast food fairly often. Pt instructed on diabetic/weight reduction/cardiac diet guidelines. Explained carbohydrate servings and amounts recommended at regular meals/HS. Stressed foods to limit (concentrated sweets, high sodium/high fat,cholesterol foods)/portion-control/spacing of meals. Sample menus provided. Pt appeared to understand information. Reinforcement regarding diabetic guidelines needed. Pt informed of [employer] Diabetic classes/encouraged to attend.    Educated on Carb Control 4/Weight Reduction/Cardiac diet education.  Learners: Patient  Readiness: Acceptance  Method: Explanation and Handout  Response: Verbalizes Understanding and Needs Reinforcement  Contact name and number provided.    Valeria Vinson, RD, LD      
OCCUPATIONAL THERAPY  INPATIENT REHAB TREATMENT NOTE  [facility]      NAME: Kane Gibbs  : 1955 (68 y.o.)  MRN: [id number]  CODE STATUS: Full Code  Room: R260/R260-01    Date of Service: 2024    Referring Physician: Dr. Gaines  Rehab Diagnosis: Impaired mobility and ADLs s/p R femur intramedullary Nail Retrograde r/t closed fracture distal end of R femure and RT proximal fibula fracture s/p fall    Hospital course:   Comments: Hospital Course: Narrative of hospital course/history of present illness: 69 y/o male, working at [employer] when he slipped and fell to the floor in a seated position with right leg bent under left leg. Pt states that he heard a \"pop\" and experienced immediate severe right knee/leg pain. He did not strike his head and had no LOC. CT of R knee showed complex acute fracture of the distal femur. Acute fracture of the proximal fibula. Pt underwent R femur intramedullary nail retrigrade for closed fracture of distal end of the R femur.      Restrictions  Restrictions/Precautions  Restrictions/Precautions: Fall Risk, ROM Restrictions, Weight Bearing   Lower Extremity Weight Bearing Restrictions  Right Lower Extremity Weight Bearing: Toe Touch Weight Bearing    Patient's date of birth confirmed: Yes    SAFETY:  Safety Devices  Safety Devices in place: Yes  Type of devices: All fall risk precautions in place    SUBJECTIVE: \"I have a $26,000 walk in tub at home that I can't even use.\"    Pain at start of treatment: Yes: 10    Pain at end of treatment: Yes: 6/10    Location: R knee/LE  Description ache, stiff  Nursing notified: Declined  Intervention: None    COGNITION:  Orientation  Overall Orientation Status: Within Functional Limits  Cognition  Overall Cognitive Status: WFL    OBJECTIVE:    Grooming/Oral Hygiene  Assistance Level: Independent  Upper Extremity Bathing  Assistance Level: Modified independent  Lower Extremity Bathing  Assistance Level: Modified 
OCCUPATIONAL THERAPY  INPATIENT REHAB TREATMENT NOTE  [facility]      NAME: Kane Gibbs  : 1955 (68 y.o.)  MRN: [id number]  CODE STATUS: Full Code  Room: R260/R260-01    Date of Service: 2024    Referring Physician: Dr. Gaines  Rehab Diagnosis: Impaired mobility and ADLs s/p R femur intramedullary Nail Retrograde r/t closed fracture distal end of R femure and RT proximal fibula fracture s/p fall    Hospital course:   Comments: Hospital Course: Narrative of hospital course/history of present illness: 69 y/o male, working at [employer] when he slipped and fell to the floor in a seated position with right leg bent under left leg. Pt states that he heard a \"pop\" and experienced immediate severe right knee/leg pain. He did not strike his head and had no LOC. CT of R knee showed complex acute fracture of the distal femur. Acute fracture of the proximal fibula. Pt underwent R femur intramedullary nail retrigrade for closed fracture of distal end of the R femur.      Restrictions  Restrictions/Precautions  Restrictions/Precautions: Fall Risk, ROM Restrictions, Weight Bearing   Lower Extremity Weight Bearing Restrictions  Right Lower Extremity Weight Bearing: Toe Touch Weight Bearing    Patient's date of birth confirmed: Yes    SAFETY:  Safety Devices  Safety Devices in place: Yes  Type of devices: All fall risk precautions in place    SUBJECTIVE: \"I'll be alright.\" - at home upon discharge    Pain at start of treatment: Yes: 7/10    Pain at end of treatment: Yes: 10    Location: R knee  Description ADL  Nursing notified: Declined  Intervention: None    COGNITION:  Orientation  Overall Orientation Status: Within Functional Limits  Cognition  Overall Cognitive Status: WFL    OBJECTIVE:    Functional Mobility:  Standing Balance Level: SBA  Standing Balance Time: Patient able to stand up to 3 minutes 30 seconds with 0-2 UE support x multiple trials. Patient with 0 LOB.   Standing Balance Activity: 
OCCUPATIONAL THERAPY  INPATIENT REHAB TREATMENT NOTE  [facility]      NAME: Kane Gibbs  : 1955 (68 y.o.)  MRN: [id number]  CODE STATUS: Full Code  Room: R260/R260-01    Date of Service: 2024    Referring Physician: Dr. Gaines  Rehab Diagnosis: Impaired mobility and ADLs s/p R femur intramedullary Nail Retrograde r/t closed fracture distal end of R femure and RT proximal fibula fracture s/p fall    Hospital course:   Comments: Hospital Course: Narrative of hospital course/history of present illness: 69 y/o male, working at [employer] when he slipped and fell to the floor in a seated position with right leg bent under left leg. Pt states that he heard a \"pop\" and experienced immediate severe right knee/leg pain. He did not strike his head and had no LOC. CT of R knee showed complex acute fracture of the distal femur. Acute fracture of the proximal fibula. Pt underwent R femur intramedullary nail retrigrade for closed fracture of distal end of the R femur.      Restrictions  Restrictions/Precautions  Restrictions/Precautions: Fall Risk, ROM Restrictions, Weight Bearing   Lower Extremity Weight Bearing Restrictions  Right Lower Extremity Weight Bearing: Toe Touch Weight Bearing    Patient's date of birth confirmed: Yes    SAFETY:  Safety Devices  Safety Devices in place: Yes  Type of devices: All fall risk precautions in place    SUBJECTIVE: \"\"I'm not going to care about wearing socks at home.\"    Pain at start of treatment: Yes: 10    Pain at end of treatment: Yes: 6/10    Location: R knee/LE  Description \"like a toothache\"  Nursing notified: Declined  Intervention: None    COGNITION:  Orientation  Overall Orientation Status: Within Functional Limits  Cognition  Overall Cognitive Status: WFL    OBJECTIVE:    Feeding  Assistance Level: Independent  Skilled Clinical Factors: Patient positive hand -> mouth eating lunch.  Grooming/Oral Hygiene  Assistance Level: Modified independent  Upper 
OCCUPATIONAL THERAPY  INPATIENT REHAB TREATMENT NOTE  [facility]      NAME: Kane Gibbs  : 1955 (68 y.o.)  MRN: [id number]  CODE STATUS: Full Code  Room: R260/R260-01    Date of Service: 2024    Referring Physician: Dr. Gaines  Rehab Diagnosis: Impaired mobility and ADLs s/p R femur intramedullary Nail Retrograde r/t closed fracture distal end of R femure and RT proximal fibula fracture s/p fall    Hospital course:   Comments: Hospital Course: Narrative of hospital course/history of present illness: 69 y/o male, working at [employer] when he slipped and fell to the floor in a seated position with right leg bent under left leg. Pt states that he heard a \"pop\" and experienced immediate severe right knee/leg pain. He did not strike his head and had no LOC. CT of R knee showed complex acute fracture of the distal femur. Acute fracture of the proximal fibula. Pt underwent R femur intramedullary nail retrigrade for closed fracture of distal end of the R femur.      Restrictions  Restrictions/Precautions  Restrictions/Precautions: Fall Risk, ROM Restrictions, Weight Bearing   Lower Extremity Weight Bearing Restrictions  Right Lower Extremity Weight Bearing: Toe Touch Weight Bearing    Patient's date of birth confirmed: Yes    SAFETY:  Safety Devices  Safety Devices in place: Yes  Type of devices: All fall risk precautions in place    SUBJECTIVE: \"I like to do things as fast as possible.\"    Pain at start of treatment: Yes: 5/10    Pain at end of treatment: Yes: 5/10    Location: R knee/LE  Description ache  Nursing notified: Declined  Intervention: None    COGNITION:  Orientation  Overall Orientation Status: Within Functional Limits  Cognition  Overall Cognitive Status: WFL    OBJECTIVE:    Instrumental ADL's  Instrumental ADLs: Yes  Health Management  Health Management Level of Assistance: Modified independent  Health Management:   Medication Management Simulation Activity:  Patient completed 
OCCUPATIONAL THERAPY  INPATIENT REHAB TREATMENT NOTE  [facility]      NAME: Kane Gibbs  : 1955 (68 y.o.)  MRN: [id number]  CODE STATUS: Full Code  Room: R260/R260-01    Date of Service: 2024    Referring Physician: Dr. Gaines  Rehab Diagnosis: Impaired mobility and ADLs s/p R femur intramedullary Nail Retrograde r/t closed fracture distal end of R femure and RT proximal fibula fracture s/p fall    Hospital course:   Comments: Hospital Course: Narrative of hospital course/history of present illness: 67 y/o male, working at [employer] when he slipped and fell to the floor in a seated position with right leg bent under left leg. Pt states that he heard a \"pop\" and experienced immediate severe right knee/leg pain. He did not strike his head and had no LOC. CT of R knee showed complex acute fracture of the distal femur. Acute fracture of the proximal fibula. Pt underwent R femur intramedullary nail retrigrade for closed fracture of distal end of the R femur.      Restrictions  Restrictions/Precautions  Restrictions/Precautions: Fall Risk, ROM Restrictions, Weight Bearing   Lower Extremity Weight Bearing Restrictions  Right Lower Extremity Weight Bearing: Toe Touch Weight Bearing    Position Activity Restriction  Other position/activity restrictions: \"Touch down WB\" per Dr. Cifuentes on operative leg for balance, able to complete ROM as tolerated, pt may shower    Patient's date of birth confirmed: Yes    SAFETY:  Safety Devices  Safety Devices in place: Yes  Type of devices: All fall risk precautions in place    SUBJECTIVE: I don't do a lot of cooking.\"    Pain at start of treatment: Yes: 10    Pain at end of treatment: Yes: 10    Location: R knee  Description ache  Nursing notified: No  Intervention: None    COGNITION:  Orientation  Overall Orientation Status: Within Functional Limits  Cognition  Overall Cognitive Status: WFL    OBJECTIVE:    Functional Mobility  Device: Wheelchair  Activity: 
OCCUPATIONAL THERAPY  INPATIENT REHAB TREATMENT NOTE  [facility]      NAME: Kane Gibbs  : 1955 (68 y.o.)  MRN: [id number]  CODE STATUS: Full Code  Room: R260/R260-01    Date of Service: 2024    Referring Physician: Dr. Gaines  Rehab Diagnosis: Impaired mobility and ADLs s/p R femur intramedullary Nail Retrograde r/t closed fracture distal end of R femure and RT proximal fibula fracture s/p fall    Hospital course:   Comments: Hospital Course: Narrative of hospital course/history of present illness: 67 y/o male, working at [employer] when he slipped and fell to the floor in a seated position with right leg bent under left leg. Pt states that he heard a \"pop\" and experienced immediate severe right knee/leg pain. He did not strike his head and had no LOC. CT of R knee showed complex acute fracture of the distal femur. Acute fracture of the proximal fibula. Pt underwent R femur intramedullary nail retrigrade for closed fracture of distal end of the R femur.      Restrictions  Restrictions/Precautions  Restrictions/Precautions: Fall Risk, ROM Restrictions, Weight Bearing   Lower Extremity Weight Bearing Restrictions  Right Lower Extremity Weight Bearing: Toe Touch Weight Bearing    Position Activity Restriction  Other position/activity restrictions: \"Touch down WB\" per Dr. Cifuentes on operative leg for balance, able to complete ROM as tolerated, pt may shower    Patient's date of birth confirmed: Yes    SAFETY:  Safety Devices  Safety Devices in place: Yes  Type of devices: All fall risk precautions in place    SUBJECTIVE: \"I don't plan on doing much shopping for a while.\"    Pain at start of treatment: Yes: 510    Pain at end of treatment: Yes: 5/10    Location: R knee/LE/groin  Description ache  Nursing notified: Declined  Intervention: None    COGNITION:  Orientation  Overall Orientation Status: Within Functional Limits  Cognition  Overall Cognitive Status: WFL  Cognition Comment: 
OCCUPATIONAL THERAPY  INPATIENT REHAB TREATMENT NOTE  [facility]      NAME: Kane Gibbs  : 1955 (68 y.o.)  MRN: [id number]  CODE STATUS: Full Code  Room: R260/R260-01    Date of Service: 2024    Referring Physician: Dr. Gaines  Rehab Diagnosis: Impaired mobility and ADLs s/p R femur intramedullary Nail Retrograde r/t closed fracture distal end of R femure and RT proximal fibula fracture s/p fall    Hospital course:   Comments: Hospital Course: Narrative of hospital course/history of present illness: 67 y/o male, working at [employer] when he slipped and fell to the floor in a seated position with right leg bent under left leg. Pt states that he heard a \"pop\" and experienced immediate severe right knee/leg pain. He did not strike his head and had no LOC. CT of R knee showed complex acute fracture of the distal femur. Acute fracture of the proximal fibula. Pt underwent R femur intramedullary nail retrigrade for closed fracture of distal end of the R femur.      Restrictions  Restrictions/Precautions  Restrictions/Precautions: Fall Risk, ROM Restrictions, Weight Bearing   Lower Extremity Weight Bearing Restrictions  Right Lower Extremity Weight Bearing: Toe Touch Weight Bearing    Patient's date of birth confirmed: Yes    SAFETY:  Safety Devices  Safety Devices in place: Yes  Type of devices: All fall risk precautions in place    SUBJECTIVE: \"This watch weighs 1 pound.\"    Pain at start of treatment: Yes: 6/10    Pain at end of treatment: Yes: 4/10    Location: R knee/LE  Description like a toothache  Nursing notified: Declined  Intervention: None    COGNITION:  Orientation  Overall Orientation Status: Within Functional Limits  Cognition  Overall Cognitive Status: WFL    OBJECTIVE:    Money Management  Money Management Level of Assistance: Supervision  Money Management:   Medication Management Simulation Activity:  Patient completed simulation activity utilizing 7 provided medication bottles 
PHARMACY NOTE  [facility] Per the [facility] Formulary Committee Policy, this medication is non-formulary and not stocked by pharmacy. The medication can be reordered at discharge.      1. Refrigerated Items/Liquids/Insulin As these items are not able to be identified and/or ensure these agents were stored at appropriate temperature and humidity conditions, the patient’s medication will not be permitted to be used for administration.      [place], [facility] PharmD  
Patient complains of 7/10 pain to right leg. PRN oxycodone given. Ice pack applied.  Electronically signed by Humera Jones LPN on 6/26/2024 at 9:50 AM    Dr. Weiss called to clarify patient's wt bearing status as touch toe wt bearing and range of motion as tolerated just as his orders state.  Electronically signed by Humera Jones LPN on 6/26/2024 at 9:57 AM    
Physical Therapy  Facility/Department: [facility]  Rehabilitation Discharge note    NAME: Kane Gibbs  : 1955  MRN: [id number]    Date of discharge: 24        Past Medical History:   Diagnosis Date    Hyperglycemia 2024    Hypertension     Lumbosacral disc disease     Osteoarthritis      Past Surgical History:   Procedure Laterality Date    FERTILITY SURGERY Right 2024    RIGHT FEMUR INTRAMEDULLARY NAIL RETROGRADE. performed by Gilmar Weiss MD at [facility] OR       Restrictions  Restrictions/Precautions  Restrictions/Precautions: Fall Risk, ROM Restrictions, Weight Bearing  Lower Extremity Weight Bearing Restrictions  Right Lower Extremity Weight Bearing: Toe Touch Weight Bearing  Position Activity Restriction  Other position/activity restrictions: \"Touch down WB\" per Dr. Cifuentes on operative leg for balance, able to complete ROM as tolerated, pt may shower    Objective    Bed Mobility  Overall Assistance Level: Modified Independent  Additional Factors: Verbal cues;Increased time to complete;Head of bed flat;Without handrails  Roll Left  Assistance Level: Modified independent  Roll Right  Assistance Level: Modified independent  Sit to Supine  Assistance Level: Modified independent  Skilled Clinical Factors: increased time and effort, able to complete without assist  Supine to Sit  Assistance Level: Modified independent  Skilled Clinical Factors: increased time and effort, able to complete without assist  Scooting  Assistance Level: Modified independent  Skilled Clinical Factors: scooting towards EOB     Transfers  Surface: Wheelchair;From bed;Standard toilet;To bed  Additional Factors: Verbal cues;Hand placement cues;Increased time to complete  Device: Walker  Sit to Stand  Assistance Level: Modified independent  Skilled Clinical Factors: vc's to push up from WC armrests, no lifting assisted required this session, maintains TTWB throughout  Stand to Sit  Assistance Level: Modified 
Physical Therapy Rehab Treatment Note  [facility]       NAME: Kane Gibbs  : 1955 (68 y.o.)  MRN: [id number]  CODE STATUS: Full Code    Date of Service: 2024       Restrictions:  Restrictions/Precautions: Fall Risk, ROM Restrictions, Weight Bearing  Lower Extremity Weight Bearing Restrictions  Right Lower Extremity Weight Bearing: Toe Touch Weight Bearing  Position Activity Restriction  Other position/activity restrictions: \"Touch down WB\" per Dr. Cifuentes on operative leg for balance, able to complete ROM as tolerated, pt may shower       SUBJECTIVE:   Subjective: \"I didnt eat much\"    Pain  Pain: 5/10 pain pre/post session- medicated prior to session      OBJECTIVE:   Transfers  Surface: Wheelchair  Additional Factors: Verbal cues;Hand placement cues;Increased time to complete  Device: Walker  Sit to Stand  Assistance Level: Stand by assist  Skilled Clinical Factors: vc's to push up from WC armrests, no lifting assisted required this session, maintains TTWB throughout  Stand to Sit  Assistance Level: Stand by assist  Skilled Clinical Factors: good ability to reach back, SBA for stability throughout    PT Exercises  Exercise Treatment: heel slides with  with RLE x10 focus on increasing ROM  PROM Exercises: seated HS/gastroc stretch and knee flex/ext stretch x3 30 sec hold  A/AROM Exercises: seated AP/LAQ/Marches/Hip Abd/Hip Add/QS/GS x15 BLE  Resistive Exercises: RTB HS curls/hip abd x10 BLE  Functional Mobility Circuit Training: STS x3 with focus on technique and endurance     Activity Tolerance  Activity Tolerance: Patient tolerated treatment well    ASSESSMENT/PROGRESS TOWARDS GOALS:   Assessment  Assessment: Pt reporting increased pain and stiffness this PM. Requests to keep session focused on seated activities for continued strengthening. Good tolerance to activities throughout.  Activity Tolerance: Patient tolerated treatment 
Physical Therapy Rehab Treatment Note  [facility] [place] [facility]  [facility]       NAME: Kane Gibbs  : 1955 (68 y.o.)  MRN: [id number]  CODE STATUS: Full Code    Date of Service: 2024       Restrictions:  Restrictions/Precautions: Fall Risk, ROM Restrictions, Weight Bearing  Lower Extremity Weight Bearing Restrictions  Right Lower Extremity Weight Bearing: Toe Touch Weight Bearing  Position Activity Restriction  Other position/activity restrictions: \"Touch down WB\" per Dr. Cifuentes on operative leg for balance, able to complete ROM as tolerated, pt may shower       SUBJECTIVE:   Subjective: \"Can't believe its Friday\"    Pain  Pain: 6/10 pain pre/post session- medicated prior to session      OBJECTIVE:   Bed Mobility  Overall Assistance Level: Modified Independent  Additional Factors: Verbal cues;Increased time to complete;Head of bed flat;Without handrails  Roll Left  Assistance Level: Modified independent  Roll Right  Assistance Level: Modified independent  Sit to Supine  Assistance Level: Modified independent  Skilled Clinical Factors: increased time and effort, able to complete without assist  Supine to Sit  Assistance Level: Modified independent  Skilled Clinical Factors: increased time and effort, able to complete without assist  Scooting  Assistance Level: Modified independent  Skilled Clinical Factors: scooting towards EOB    Transfers  Surface: Wheelchair;From bed;Standard toilet;To bed  Additional Factors: Verbal cues;Hand placement cues;Increased time to complete  Device: Walker  Sit to Stand  Assistance Level: Modified independent  Skilled Clinical Factors: vc's to push up from WC armrests, no lifting assisted required this session, maintains TTWB throughout  Stand to Sit  Assistance Level: Modified independent  Skilled Clinical Factors: good ability to reach back, supervision for stability throughout  Stand Pivot  Assistance Level: Supervision  Skilled Clinical Factors: bed to wc 
Physical Therapy Rehab Treatment Note  [facility] [place] [facility]  [facility]       NAME: Kane Gibbs  : 1955 (68 y.o.)  MRN: [id number]  CODE STATUS: Full Code    Date of Service: 2024       Restrictions:  Restrictions/Precautions: Fall Risk, ROM Restrictions, Weight Bearing  Lower Extremity Weight Bearing Restrictions  Right Lower Extremity Weight Bearing: Toe Touch Weight Bearing  Position Activity Restriction  Other position/activity restrictions: \"Touch down WB\" per Dr. Cifuentes on operative leg for balance, able to complete ROM as tolerated, pt may shower       SUBJECTIVE:   Subjective: \"Lets do it\"    Pain  Pain: denies pain pre/post session, reports dull ache at rest      OBJECTIVE:   Bed Mobility  Overall Assistance Level: Stand By Assist  Additional Factors: Verbal cues;Increased time to complete;Head of bed flat;Without handrails (on therapy mat)  Roll Left  Assistance Level: Stand by assist  Roll Right  Assistance Level: Stand by assist  Sit to Supine  Assistance Level: Stand by assist  Skilled Clinical Factors: increased time and effort, able to complete without assist  Supine to Sit  Assistance Level: Stand by assist  Skilled Clinical Factors: increased time and effort, able to complete without assist  Scooting  Assistance Level: Stand by assist  Skilled Clinical Factors: scooting towards EOM    Transfers  Surface: Wheelchair;To mat;From mat  Additional Factors: Verbal cues;Hand placement cues;Increased time to complete  Device: Walker  Sit to Stand  Assistance Level: Minimal assistance  Skilled Clinical Factors: vc's to push up from WC armrests, good Lidia lifting assist required, maintains TTWB throughout  Stand to Sit  Assistance Level: Minimal assistance  Skilled Clinical Factors: good ability to reach back, Lidia for stability throughout    Ambulation  Surface: Level surface  Device: Rolling walker  Distance: 10' x 4  Activity: Within Unit  Additional Factors: Verbal cues;Hand 
Physical Therapy Rehab Treatment Note  Facility/Department: [facility] [place] [facility]  Room: R260R260-01       NAME: Kane Gibbs  : 1955 (68 y.o.)  MRN: [id number]  CODE STATUS: Full Code    Date of Service: 2024       Restrictions:  Restrictions/Precautions: Fall Risk, ROM Restrictions, Weight Bearing  Lower Extremity Weight Bearing Restrictions  Right Lower Extremity Weight Bearing: Toe Touch Weight Bearing  Position Activity Restriction  Other position/activity restrictions: \"Touch down WB\" per Dr. Cifuentes on operative leg for balance, able to complete ROM as tolerated, pt may shower       SUBJECTIVE:   Subjective: \"I dont have a lot of pain\"    Pain  Pain: denies pain pre/post session, reports dull ache at rest      OBJECTIVE:   Transfers  Surface: Wheelchair  Additional Factors: Verbal cues;Hand placement cues;Increased time to complete  Device: Walker  Sit to Stand  Assistance Level: Stand by assist  Skilled Clinical Factors: vc's to push up from WC armrests, no lifting assisted required this session, maintains TTWB throughout  Stand to Sit  Assistance Level: Stand by assist  Skilled Clinical Factors: good ability to reach back, SBA for stability throughout    Ambulation  Surface: Level surface  Device: Rolling walker  Distance: 50' standard walker, 20' joyce WW  Activity: Within Unit  Additional Factors: Verbal cues;Hand placement cues;Increased time to complete  Assistance Level:  (touching assist)  Gait Deviations: Slow tabitha;Unsteady gait;Narrow base of support;Path deviations  Skilled Clinical Factors: vc's throughout for WW management, quick impulsive movements but no LOB/instability throughout. fatigues quickly but reports minimal pain throughout, maintains TTWB initially, but lack of ability to maintain as distance increases and pt grows fatigued. frequent standing rest breaks. pt safest with use of joyce WW at this time.    PT Exercises  PROM Exercises: seated HS/gastroc stretch and knee flex/ext stretch 
Physical Therapy Rehab Treatment Note  [place] [facility] [place] [facility]       NAME: Kane Gibbs  : 1955 (68 y.o.)  MRN: [id number]  CODE STATUS: Full Code    Date of Service: 2024       Restrictions:  Restrictions/Precautions: Fall Risk, ROM Restrictions, Weight Bearing  Lower Extremity Weight Bearing Restrictions  Right Lower Extremity Weight Bearing: Toe Touch Weight Bearing  Position Activity Restriction  Other position/activity restrictions: \"Touch down WB\" per Dr. Cifuentes on operative leg for balance, able to complete ROM as tolerated, pt may shower       SUBJECTIVE:   Subjective: \"This morning beat me up\"    Pain  Pain: -7/10 pain pre/post session- RN notified, medicated during session      OBJECTIVE:   Bed Mobility  Overall Assistance Level: Stand By Assist  Additional Factors: Verbal cues;Increased time to complete;Head of bed flat;Without handrails (on therapy mat)  Roll Left  Assistance Level: Stand by assist  Roll Right  Assistance Level: Stand by assist  Sit to Supine  Assistance Level: Stand by assist  Skilled Clinical Factors: increased time and effort, able to complete without assist  Supine to Sit  Assistance Level: Stand by assist  Skilled Clinical Factors: increased time and effort, able to complete without assist  Scooting  Assistance Level: Stand by assist  Skilled Clinical Factors: scooting towards EOM    Transfers  Surface: Wheelchair;To mat;From mat  Additional Factors: Verbal cues;Hand placement cues;Increased time to complete  Device: Walker  Sit to Stand  Assistance Level: Stand by assist  Skilled Clinical Factors: vc's to push up from WC armrests, no lifting assisted required this session, maintains TTWB throughout  Stand to Sit  Assistance Level: Stand by assist  Skilled Clinical Factors: good ability to reach back, SBA for stability throughout    Ambulation  Surface: Level surface  Device:  (joyce WW)  Distance: 20' joyce WW  Activity: Within Unit  Additional 
Physical Therapy Rehab Treatment Note  Facility/Department: [facility] [place] [facility]  Room: R260/R260-01       NAME: Kane Gibbs  : 1955 (68 y.o.)  MRN: [id number]  CODE STATUS: Full Code    Date of Service: 2024       Restrictions:  Restrictions/Precautions: Fall Risk, ROM Restrictions, Weight Bearing  Lower Extremity Weight Bearing Restrictions  Right Lower Extremity Weight Bearing: Toe Touch Weight Bearing  Position Activity Restriction  Other position/activity restrictions: \"Touch down WB\" per Dr. Cifuentes on operative leg for balance, able to complete ROM as tolerated, pt may shower       SUBJECTIVE:   Subjective: \"I am doing better\"    Pain  Pain: 3-4/10 pain pre/post session- medicated prior to session      OBJECTIVE:   Transfers  Surface: Wheelchair  Additional Factors: Verbal cues;Hand placement cues;Increased time to complete  Device: Walker  Sit to Stand  Assistance Level: Supervision  Skilled Clinical Factors: vc's to push up from WC armrests, no lifting assisted required this session, maintains TTWB throughout  Stand to Sit  Assistance Level: Supervision  Skilled Clinical Factors: good ability to reach back, supervision for stability throughout  Car Transfer  Assistance Level: Supervision  Skilled Clinical Factors: visual and verbal demo provided, quick impulsive movements but able to complete transitions without physical assist.    Ambulation  Surface: Level surface  Device:  (joyce WW)  Distance: 5' x 3, 15'  Activity: Within Unit  Additional Factors: Verbal cues;Hand placement cues;Increased time to complete  Assistance Level: Supervision  Gait Deviations: Slow tabitha;Unsteady gait;Narrow base of support;Path deviations  Skilled Clinical Factors: vc's throughout for WW management, quick impulsive movements but no LOB/instability throughout. fatigues quickly but reports minimal pain throughout. pt safest with use of joyce WW at this time. no hands on assist this session. focus on short household 
Physical Therapy Rehab Treatment Note  Facility/Department: [facility] [place] [facility]  Room: R260/R260-01       NAME: Kane Gibbs  : 1955 (68 y.o.)  MRN: [id number]  CODE STATUS: Full Code    Date of Service: 2024       Restrictions:  Restrictions/Precautions: Fall Risk, ROM Restrictions, Weight Bearing  Lower Extremity Weight Bearing Restrictions  Right Lower Extremity Weight Bearing: Toe Touch Weight Bearing  Position Activity Restriction  Other position/activity restrictions: \"Touch down WB\" per Dr. Cifuentes on operative leg for balance, able to complete ROM as tolerated, pt may shower       SUBJECTIVE:   Subjective: \"I can do it\"    Pain  Pain: 4/10 pain pre/post session- received meds      OBJECTIVE:     Transfers  Surface: Wheelchair  Additional Factors: Verbal cues;Hand placement cues;Increased time to complete  Device: Walker  Sit to Stand  Assistance Level: Stand by assist  Skilled Clinical Factors: vc's to push up from WC armrests, no lifting assisted required this session, maintains TTWB throughout  Stand to Sit  Assistance Level: Stand by assist  Skilled Clinical Factors: good ability to reach back, SBA for stability throughout  Car Transfer  Assistance Level: Stand by assist  Skilled Clinical Factors: visual and verbal demo provided, quick impulsive movements but able to complete transitions without physical assist.    Ambulation  Surface: Level surface  Device: Rolling walker  Distance: 15' x 2  Activity: Within Unit  Additional Factors: Verbal cues;Hand placement cues;Increased time to complete  Assistance Level: Minimal assistance  Gait Deviations: Slow tabitha;Unsteady gait;Narrow base of support;Path deviations  Skilled Clinical Factors: vc's throughout for WW management, quick impulsive movements but no LOB/instability throughout. fatigues quickly but reports minimal pain throughout, maintains TTWB.    Wheelchair  Surface: Level surface  Device: Standard wheelchair  Assistance Level for Propulsion: 
Physical Therapy Rehab Treatment Note  [place] [facility] [place] [facility]  Room: R260R260-01       NAME: Kane Gibbs  : 1955 (68 y.o.)  MRN: [id number]  CODE STATUS: Full Code    Date of Service: 2024       Restrictions:  Restrictions/Precautions: Fall Risk, ROM Restrictions, Weight Bearing  Lower Extremity Weight Bearing Restrictions  Right Lower Extremity Weight Bearing: Toe Touch Weight Bearing  Position Activity Restriction  Other position/activity restrictions: \"Touch down WB\" per Dr. Cifuentes on operative leg for balance, able to complete ROM as tolerated, pt may shower       SUBJECTIVE:   Subjective: \"A bit better today\"    Pain  Pain: 5/10 pain pre/post session- medicated prior to session      OBJECTIVE:   Transfers  Surface: Wheelchair  Additional Factors: Verbal cues;Hand placement cues;Increased time to complete  Device: Walker  Sit to Stand  Assistance Level: Stand by assist  Skilled Clinical Factors: vc's to push up from WC armrests, no lifting assisted required this session, maintains TTWB throughout  Stand to Sit  Assistance Level: Stand by assist  Skilled Clinical Factors: good ability to reach back, SBA for stability throughout  Car Transfer  Assistance Level: Supervision  Skilled Clinical Factors: visual and verbal demo provided, quick impulsive movements but able to complete transitions without physical assist.    Ambulation  Surface: Level surface  Device:  (joyce WW)  Distance: 15', 10'  Activity: Within Unit  Additional Factors: Verbal cues;Hand placement cues;Increased time to complete  Assistance Level: Stand by assist  Gait Deviations: Slow tabitha;Unsteady gait;Narrow base of support;Path deviations  Skilled Clinical Factors: vc's throughout for WW management, quick impulsive movements but no LOB/instability throughout. fatigues quickly but reports minimal pain throughout. pt safest with use of joyce WW at this time. no hands on assist this session    Wheelchair  Surface: Level 
Physical Therapy Rehab Treatment Note  [place] [facility] [place] [facility]  [facility]       NAME: Kane Gibbs  : 1955 (68 y.o.)  MRN: [id number]  CODE STATUS: Full Code    Date of Service: 2024       Restrictions:  Restrictions/Precautions: Fall Risk, ROM Restrictions, Weight Bearing  Lower Extremity Weight Bearing Restrictions  Right Lower Extremity Weight Bearing: Toe Touch Weight Bearing  Position Activity Restriction  Other position/activity restrictions: \"Touch down WB\" per Dr. Cifuentes on operative leg for balance, able to complete ROM as tolerated, pt may shower       SUBJECTIVE:   Subjective: Patient reports he will D/C home today, has a friend that will be staying with him. \"The stairs might be challenging\"    Pain  Pain: 5/10 pre/post session Rt LE- declined intervention.      OBJECTIVE:                  Transfers  Surface: Wheelchair  Device: Walker  Sit to Stand  Assistance Level: Modified independent  Stand to Sit  Assistance Level: Modified independent    Ambulation  Surface: Level surface  Distance: 25 feet  Activity: Within Unit  Additional Factors: Increased time to complete  Assistance Level: Modified independent  Gait Deviations: Slow tabitha;Unsteady gait;Narrow base of support;Path deviations  Skilled Clinical Factors: Slight decrease in Lt foot clearance    Stairs  Stair Height: 6''  Number of Stairs: 2  Assistance Level: Moderate assistance;Contact guard assist  Skilled Clinical Factors: VCs for sequencing, patient unable to maintain WBing status when attempting to hop up; patient completed on buttocks with touching assistance    Wheelchair  Surface: Level surface  Device: Standard wheelchair  Assistance Required to Manage Parts: No assistance  Assistance Level for Propulsion: Independent  Propulsion Method: Bilateral upper extremities  Propulsion Distance: 50ft                  ASSESSMENT/PROGRESS TOWARDS GOALS:   Assessment  Assessment: Trialed stairs this treatment as patient 
Pt assessment completed. Patient alert, oriented and cooperative. Complains of pain to right leg 7/10. Medications given per MAR. Pain medications effective. Denies any further needs or complaints at this time. Call light within reach.  
Pt discharged home. Dr. Vega, Dr. Winters, and CAR Castillo, made aware. AVS papers printed and reviewed with pt. All questions answered. Belongings reconciled and with pt. Pt left with transport at 1645.  
Pt. Evaluated by . will need a follow up appointment outpatient.   Pt oxygen is approved per SW. Will not need a new overnight pulse ox test. Updated to Dr. Gaines to discontinue the order for Saturday nights pulse ox test.   Pt. Offer education to the need for oxygen at HS at this time. Pt. Is willing to try.   Pt. Evaluated by Dr. Ji and new order received for metformin.   dulaglutide (TRULICITY) SC injection 0.75 mg  was discontinued will need to follow up with endocrinology upon discharge as this medication requires pre approval per outpatient pharmacy.   Pt. Metformin will be sent to his preferred pharmacy as well per [facility] out patient therapy.   Pt accepted education on the Trulicity as well as metformin. Hand outs provided.   
[facility]  INPATIENT REHABILITATION  INDEPENDENT IN ROOM NOTE  Room: R260/R260-01  Admit Date: 2024       Date: 2024  Patient Name: Kane Gibbs        MRN: [id number]    : 1955  (68 y.o.)  Gender: male                      Patient orientation to the independent living suite(256)  [x] Yes    []   No    [] NA  (Safety checks to consider: Oxygen, Fire Alarm, Stove safety, Call alarm, Bed alarm,   Bed power, TV, Phone)       Nursin. Pt physical ability to be independent in room/suite:  [x] Yes    []   No   Comment:    2. Pt demonstrates cognitive ability to be independent in room/suite:  [x] Yes    []   No   Comment:    3. Pt demonstrates emotional ability to be independent in room/suite:  [x] Yes    []   No   Comment:    4. Special Considerations/Equipment if needed: [x] NA   Comment:    Recommend independent in room/suite:  [x] Yes    []   No            Signature: Electronically signed by Seble Babb RN on 2024 at 2:35 PM        Occupational Therapy:  1. Pt physical ability to be independent in room/suite:  [x] Yes    []   No   Comment:     2. Pt demonstrates cognitive ability to be independent in room/suite:  [x] Yes    []   No   Comment:    3. Pt demonstrates emotional ability to be independent in room/suite:  [x] Yes    []   No   Comment:    4. Special Considerations/Equipment if needed: [] NA   Comment: w/c level    Recommend independent in room/suite:  [x] Yes    []   No       Signature: Electronically signed by JOSE G Orellana on 24 at 2:51 PM EDT      Physical Therapy:  1. Pt physical ability to be independent in room/suite:  [x] Yes    []   No   Comment:     2. Pt demonstrates cognitive ability to be independent in room/suite:  [x] Yes    []   No   Comment:    3. Pt demonstrates emotional ability to be independent in room/suite:  [x] Yes    []   No   Comment:    4. Special Considerations/Equipment if needed: [] NA   Comment: Pt independent at  
Subjective:   The patient complains of severe acute on chronic progressive fatigue and right lower extremity pain swelling partially relieved by rest, medication titrations, PT,  OT, ice   and rest and exacerbated by recent fall at work.      I am concerned about patient’s medical complexities and barriers to advancing in rehab goals including tolerating nonweightbearing/TTWB status right lower extremity with decreased falls risk and eventual return to work.        I discussed current functional, rehabilitation, medical status with other rehabilitation providers including nursing and case management.  According to recent nursing note, \" assessment completed. Patient is A&Ox4, able to make needs known, denies pain/sob, continues with routine meds to good effect. Patient is heavy assist x1 to SPT to wheelchair, patient is TTWB to RLE. Continent of bowel and bladder. Currently resting quietly in bed, call light in reach, bed alarm engaged, will monitor.  \".    I am concerned about his need for Lovenox as a blood thinner however he is not getting it currently because his with his drop of H&H.    He complains of severe musculoskeletal pain relieved with Roxicodone and muscle relaxers.    ROS x10:  The patient also complains of severely impaired mobility and activities of daily living.  Otherwise no new problems with vision, hearing, nose, mouth, throat, dermal, cardiovascular, GI, , pulmonary, musculoskeletal, psychiatric or neurological. See Rehab H&P on Rehab chart dated .       Vital signs:  /69   Pulse 78   Temp 97.5 °F (36.4 °C) (Oral)   Resp 18   Ht 1.829 m (6')   Wt (!) 136.8 kg (301 lb 8 oz)   SpO2 97%   BMI 40.89 kg/m²   I/O:   PO/Intake:  fair PO intake, no problems observed or reported.    Bowel/Bladder: continent, constipation relieved with meds and urinary urgency.  General:  Patient is well developed, adequately nourished, non-obese and     well kempt.     HEENT:    PERRLA, hearing intact to 
Subjective:   The patient complains of severe acute on chronic progressive fatigue and right lower extremity pain swelling partially relieved by rest, medication titrations, PT,  OT, ice   and rest and exacerbated by recent fall at work.      I am concerned about patient’s medical complexities and barriers to advancing in rehab goals including tolerating nonweightbearing/TTWB status right lower extremity with decreased falls risk and eventual return to work.    Patient is hoping to fully retire now that he has had a difficult work injury.  He had previously been retired and gone back to work as needed as needed and part-time.    I discussed current functional, rehabilitation, medical status with other rehabilitation providers including nursing and case management.  According to recent   note,  \" Nocturnal pulse ox completed. Patient desated for a total of 1 hour and 4 minutes. \"    DVT prophylaxis was on hold because of severe dependent bruising following his thigh.  The bruising is evolving he will restart on his 2 baby aspirin a day.   Zulema Yoo, RN  Registered Nurse  Nursing     Flowsheet Note     Signed     Date of Service: 6/28/2024  9:15 PM     Signed         Patient assessment completed. Patient is A&Ox4, able to make needs known, denies pain/sob, continues with routine meds to good effect. Patient is heavy assist x1 to SPT to wheelchair, patient is TTWB to RLE. Continent of bowel and bladder. Currently resting quietly in bed, call light in reach, bed alarm engaged, will monitor.  Electronically signed by Zulema Yoo RN on 6/28/2024 at 9:15 PM                         I am concerned about his severe nocturnal hypoxemia greater than 1 hour of low sats likely secondary to obstructive sleep apnea secondary to his positioning on his back secondary to his leg fractures morbid obesity.  Pain is still severe he finds a lot of relief with the muscle relaxers I will start the Flexeril.    ROS x10:  The 
Subjective:   The patient complains of severe acute on chronic progressive fatigue and right lower extremity pain swelling partially relieved by rest, medication titrations, PT,  OT, ice   and rest and exacerbated by recent fall at work.      I am concerned about patient’s medical complexities and barriers to advancing in rehab goals including tolerating nonweightbearing/TTWB status right lower extremity with decreased falls risk and eventual return to work.    Patient is hoping to fully retire now that he has had a difficult work injury.  He had previously been retired and gone back to work as needed as needed and part-time.    I discussed current functional, rehabilitation, medical status with other rehabilitation providers including nursing and case management.  According to recent nursing note,  \"Patient alert, oriented and cooperative. Complains of pain to right leg 7/10. Medications given per MAR. Pain medications effective. Denies any further needs or complaints at this time. Call light within reach. \"      I am concerned about his DVT risk and will add Lovenox.  I am also concerned about his risk for sleep apnea will check screen for nocturnal hypoxemia with nocturnal pulse ox.    He is having a lot of dependent bruising in his right thigh I will hold his aspirin and discontinue his Lovenox.    ROS x10:  The patient also complains of severely impaired mobility and activities of daily living.  Otherwise no new problems with vision, hearing, nose, mouth, throat, dermal, cardiovascular, GI, , pulmonary, musculoskeletal, psychiatric or neurological. See Rehab H&P on Rehab chart dated .       Vital signs:  BP (!) 153/88   Pulse 85   Temp 97.9 °F (36.6 °C) (Oral)   Resp 18   Ht 1.829 m (6')   Wt (!) 136.8 kg (301 lb 8 oz)   SpO2 97%   BMI 40.89 kg/m²   I/O:   PO/Intake:  fair PO intake, no problems observed or reported.    Bowel/Bladder: continent, constipation relieved with meds and urinary 
Subjective:   The patient complains of severe acute on chronic progressive fatigue and right lower extremity pain swelling partially relieved by rest, medication titrations, PT,  OT, ice   and rest and exacerbated by recent fall at work.      I am concerned about patient’s medical complexities and barriers to advancing in rehab goals including tolerating nonweightbearing/TTWB status right lower extremity with decreased falls risk and eventual return to work.    Patient is hoping to fully retire now that he has had a difficult work injury.  He had previously been retired and gone back to work as needed as needed and part-time.    I discussed current functional, rehabilitation, medical status with other rehabilitation providers including nursing and case management.  According to recent nursing note, no substantive notes-- only form notes from the nursing over the past 24 hours any and all other notes reviewed as well.      I am concerned about his need for Lovenox as a blood thinner however he is not getting it currently because his with his drop of H&H.    He complains of severe musculoskeletal pain relieved with Roxicodone and muscle relaxers.  I am concerned about his DVT risk and will add Lovenox.    ROS x10:  The patient also complains of severely impaired mobility and activities of daily living.  Otherwise no new problems with vision, hearing, nose, mouth, throat, dermal, cardiovascular, GI, , pulmonary, musculoskeletal, psychiatric or neurological. See Rehab H&P on Rehab chart dated .       Vital signs:  BP (!) 154/76   Pulse (!) 111   Temp 98.2 °F (36.8 °C) (Oral)   Resp 17   Ht 1.829 m (6')   Wt (!) 136.8 kg (301 lb 8 oz)   SpO2 97%   BMI 40.89 kg/m²   I/O:   PO/Intake:  fair PO intake, no problems observed or reported.    Bowel/Bladder: continent, constipation relieved with meds and urinary urgency.  General:  Patient is well developed, adequately nourished, non-obese and     well kempt.     HEENT: 
Swelling both legs.  No chest pain or palpitation.  No abdominal pain.  No nausea or vomiting.  No headaches, loss of conscious or seizure      ROS: 12 system review otherwise is negative for acute signs or symptoms over the last 24 hrs.     Exam: Awake, alert oriented, in no apparent distress  HEENT: Pink conjunctiva and buccal mucosa.  Normal and throat and nose  Neck: Supple, no nuchal rigidity.  Endo: No thyromegaly.  Vascular: No JVD or carotid bruit.  Chest: Clear to auscultation, no dullness to percussion.  Heart: Regular rate and rhythm, no extra sounds.  No murmur, no rub.  Abdomen: Soft, no tenderness, no rebound, no rigidity.  Clinically I could not exclude the possibility of intra-abdominal mass or organomegaly.  LE: No cyanosis or clubbing, no varices or edema.  Scar involving the lateral aspect of the right thigh.  Tenderness in that area.  To be inspected by orthopedic team..  Swelling both legs, +1 pitting edema  Neuro: Awake, alert, oriented, normal speech, normal comprehension, please refer to the rehab team for details on his functional impairment and treatment plan  MS: No joint effusion or tenderness.  Skin: No skin rash, itching, bruising or significant findings.    Assessment and plan:     *Edema, added Demadex 10 mg daily and potassium 10 mill equivalent daily    *Right femur fracture.  All surgical related issues including but not limited to pain management, pharmacological DVT prophylaxis, ambulation instruction, monitoring wound healing are to be addressed by orthopedic team    *Hypertension.  Fair control.  Continue to monitor and adjust if needed    *Hyperglycemia.  Accu-Chek with a sliding scale coverage.  A1c is pending    *Sleep apnea.  Follow-up with the PCP.    *Mild anemia.  Mild blood loss.  No indication for transfusion.  Continue to monitor    *Few other medical issues not listed above    Patient has multiple medical issues.  All appear to be fairly stable at this time.    Some 
[facility]    Acute  Rehabilitation  MUSIC THERAPY      Date:  6/25/2024        Patient Name: Kane Gibbs       MRN: [id number]        YOB: 1955 (68 y.o.)       Gender: male  Diagnosis: Impaired mobility and ADLs s/p R femur intramedullary Nail Retrograde r/t closed fracture distal end of R femure and RT proximal fibula fracture s/p fall  Referring Practitioner: Dr. Gaines    RESTRICTIONS/PRECAUTIONS:  Restrictions/Precautions: Fall Risk, ROM Restrictions, Weight Bearing     Hearing: Exceptions to WFL  Hearing Exceptions: Hard of hearing/hearing concerns (mild)    Subjective/Observation:   Patient declined participating in individual music therapy session at 12:20pm stating he is not interested at this time.     Assessment/Plan:      [] Patient would like to have music therapy, just not today. [facility] will try to see pt again, if time allows.      [] Patient would like to have music therapy another time, however their D/C is before [facility] will be back on unit.        *If patient is still on rehab unit, or comes back to rehab unit, [facility] will attempt to see patient then if time allows.      [x] Patient does not want music therapy. [facility] will not attempt to see pt again unless pt specifically requests.       CLINT Tomlinson    6/25/2024  Electronically signed by Sophia Wade on 6/25/2024 at 12:41 PM    
                 I am concerned about his severe nocturnal hypoxemia greater than 1 hour of low sats likely secondary to obstructive sleep apnea secondary to his positioning on his back secondary to his leg fractures morbid obesity.  Pain is still severe he finds a lot of relief with the muscle relaxers I will start the Flexeril.    ROS x10:  The patient also complains of severely impaired mobility and activities of daily living.  Otherwise no new problems with vision, hearing, nose, mouth, throat, dermal, cardiovascular, GI, , pulmonary, musculoskeletal, psychiatric or neurological. See Rehab H&P on Rehab chart dated .       Vital signs:  BP (!) 154/83   Pulse 93   Temp 98.1 °F (36.7 °C) (Oral)   Resp 17   Ht 1.829 m (6')   Wt (!) 136.8 kg (301 lb 8 oz)   SpO2 94%   BMI 40.89 kg/m²   I/O:   PO/Intake:  fair PO intake, no problems observed or reported.    Bowel/Bladder: continent, constipation relieved with meds and urinary urgency.  General:  Patient is well developed, adequately nourished, non-obese and     well kempt.     HEENT:    PERRLA, hearing intact to loud voice, external inspection of ear     and nose benign.  Inspection of lips, tongue and gums    Musculoskeletal: No significant change in strength or tone.  All joints stable.      Inspection and palpation of digits and nails show no clubbing,       cyanosis or inflammatory conditions.   Neuro/Psychiatric: Affect: flat.  Alert and oriented to person, place and     situation.  No significant change in deep tendon reflexes or     Sensation    Lungs:  Diminished, CTA-B. Respiration effort is normal at rest.     Heart:   S1 = S2, RRR.  No loud murmurs.    Abdomen:  Soft, non-tender, no enlargement of liver or spleen.  Extremities:  Trace lower extremity edema, severe postop pain and swelling right distal femur and knee tenderness.  Touch toe weightbearing right lower extremity  Skin:   Intact to general survey, no visualized or palpated problems 
BLE  Resistive Exercises: YTB HS curls/hip abd x10 BLE  Functional Mobility Circuit Training: STS x3 with focus on technique and endurance  Exercise Equipment: CP applied during therex ~10 mins     Activity Tolerance  Activity Tolerance: Patient tolerated treatment well    ASSESSMENT/PROGRESS TOWARDS GOALS:   Assessment  Assessment: Pt with increased fatigue and pain this date, focus of session on seated activities to improve ROM and overall strength. Pt reports feeling significant tightness in RLE this date, limiting tolerance to activities.  Activity Tolerance: Patient tolerated treatment well    Goals:  Long Term Goals  Long Term Goal 1: Independent bed mobility  Long Term Goal 2: Ambulate >/=150 ft independently with safest AD and indep with NWBing R LE  Long Term Goal 3: indep transfers with safest AD and indep with NWBing R LE  Long Term Goal 4: Pt will demonstrate stair negotiation 7 steps with 2 rails mod indep and indep with NWBing R LE  Patient Goals   Patient Goals : \"return home\"    PLAN OF CARE/Safety:   Safety Devices  Type of Devices: All fall risk precautions in place;Chair alarm in place;Left in chair      Therapy Time:   Individual   Time In 1330   Time Out 1430   Minutes 60      Minutes: 60  Transfer/Bed mobility training: 15  Gait training: 15  Neuro re education:  Therapeutic ex: 30      Alfred Early PTA, 06/26/24 at 3:19 PM             
Cranial nerves: II-XII intact, grossly non-focal.  Psychiatric: Alert and oriented, thought content appropriate, normal insight  Capillary Refill: Brisk,< 3 seconds   Peripheral Pulses: +2 palpable, equal bilaterally       Labs:   No results for input(s): \"WBC\", \"HGB\", \"HCT\", \"PLT\" in the last 72 hours.  Recent Labs     06/29/24  0442      K 4.7   CL 99   CO2 28   BUN 21   CREATININE 1.04   CALCIUM 9.3     No results for input(s): \"AST\", \"ALT\", \"BILIDIR\", \"BILITOT\", \"ALKPHOS\" in the last 72 hours.  No results for input(s): \"INR\" in the last 72 hours.  No results for input(s): \"CKTOTAL\", \"TROPONINI\" in the last 72 hours.    Urinalysis:    No results found for: \"NITRU\", \"WBCUA\", \"BACTERIA\", \"RBCUA\", \"BLOODU\", \"SPECGRAV\", \"GLUCOSEU\"    Radiology:  Vascular duplex lower extremity venous right   Final Result   No evidence of DVT in the right lower extremity.                 Assessment/Plan:    Active Hospital Problems    Diagnosis Date Noted    Nocturnal hypoxia [G47.34] 06/28/2024    New onset type 2 diabetes mellitus (HCC) [E11.9] 06/27/2024    Morbid obesity (HCC) [E66.01] 06/27/2024    Lumbosacral disc disease [M51.9] 06/24/2024    Impaired mobility ADLs sp right femur Fx [Z74.09, Z78.9] 06/22/2024    Spinal stenosis of lumbar region with neurogenic claudication [M48.062] 06/22/2024    Postoperative anemia [D64.9] 06/22/2024    Post-op pain [G89.18] 06/22/2024    Hyperglycemia [R73.9] 06/22/2024    Exacerbation of chronic back pain [M54.9, G89.29] 06/22/2024    Closed fracture of proximal end of right fibula with routine healing [S82.831D] 06/22/2024    Closed fracture of distal end of right femur, unspecified fracture morphology, initial encounter ([facility]) [S72.401A] 06/21/2024    Primary osteoarthritis of right knee [M17.11] 02/02/2017    Hypertension [I10]          DVT Prophylaxis: ASA BID  Diet: ADULT DIET; Regular; 4 carb choices (60 gm/meal); Low Fat/Low Chol/High Fiber/JEFF  Code Status: Full Code    PT/OT 
Hygiene  Assistance Level: Independent  Upper Extremity Bathing  Assistance Level: Set-up  Lower Extremity Bathing  Assistance Level: Supervision  Upper Extremity Dressing  Assistance Level: Independent  Lower Extremity Dressing  Assistance Level: Supervision  Putting On/Taking Off Footwear  Assistance Level: Supervision  Toileting  Skilled Clinical Factors: denied need  Tub/Shower Transfers  Skilled Clinical Factors: Patient completed sponge bath seated in armless chair at bathroom sink.    Functional Mobility  Device: Rolling walker  Activity: To/From bathroom  Assistance Level: Supervision  Skilled Clinical Factors: 1 verbal cue to maintain TTWB  Sit to Stand  Assistance Level: Supervision  Stand to Sit  Assistance Level: Supervision    Instrumental ADL's  Instrumental ADLs: Yes  Money Management  Money Management Level of Assistance: Modified independent  Money Management:   Money Management Simulation Activity:  Patient able to correctly withdraw 4/5 dollar amounts ($1.89, $7.94, $16.78, $52.48) from cash drawer on first attempt.   Patient able to correctly withdraw 1/1 dollar amounts ($63.74) from cash drawer on second attempt.   Patient able to correctly make change 5/5 trials ($6.19 from $50, $47.62 from $100, $67.23 from $100, $86.17 from $100, $148.54 from $200) on first attempt with pen and paper.   Patient sorted coins and bills back into cash drawer with 1 error.    ASSESSMENT: Patient pleasant and cooperative while completing ADL/IADL tasks.    Activity Tolerance: Patient tolerated treatment well      PLAN OF CARE:  Functional mobility training, Endurance training, Patient/Caregiver education & training, Equipment evaluation, education, & procurement, Self-Care / ADL, Home management training    Continue per OT POC.    Patient goals : To get home  Time Frame for Long Term Goals : within 1 week patient to demonstrate progress in the following areas in order to meet long term goals as stated on the 
LE  Long Term Goal 4: Pt will demonstrate stair negotiation 7 steps with 2 rails mod indep and indep with NWBing R LE  Patient Goals   Patient Goals : \"return home\"    PLAN OF CARE/Safety:      All precautions in place, patient back to room, ind in room    Therapy Time:   Individual   Time In 1330   Time Out 1400   Minutes 30      Minutes:30  Transfer/Bed mobility trainin  Gait trainin  Neuro re education:0  Therapeutic ex:17      Blank Shelton PTA, 24 at 2:16 PM            
Slow tabitha;Unsteady gait;Narrow base of support;Path deviations  Skilled Clinical Factors: vc's throughout for WW management, quick impulsive movements but no LOB/instability throughout. fatigues quickly but reports minimal pain throughout. pt safest with use of joyce WW at this time.    PT Exercises  Exercise Treatment: heel slides with  with RLE x10 focus on increasing ROM  PROM Exercises: seated HS/gastroc stretch and knee flex/ext stretch x3 30 sec hold  A/AROM Exercises: seated AP/LAQ/Marches/Hip Abd/Hip Add x15 BLE     Activity Tolerance  Activity Tolerance: Patient tolerated treatment well    ASSESSMENT/PROGRESS TOWARDS GOALS:   Assessment  Assessment: Pt with good participation and effort throughout tasks, able to maintain TTWB throughout all on feet activity. Good tolerance to all ROM activities and supine therex. Pt reporting increased soreness in RLE this date as opposed to previous sessions.  Activity Tolerance: Patient tolerated treatment well    Goals:  Long Term Goals  Long Term Goal 1: Independent bed mobility  Long Term Goal 2: Ambulate >/=150 ft independently with safest AD and indep with NWBing R LE  Long Term Goal 3: indep transfers with safest AD and indep with NWBing R LE  Long Term Goal 4: Pt will demonstrate stair negotiation 7 steps with 2 rails mod indep and indep with NWBing R LE  Patient Goals   Patient Goals : \"return home\"    PLAN OF CARE/Safety:   Safety Devices  Type of Devices: All fall risk precautions in place;Chair alarm in place;Left in chair;Call light within reach      Therapy Time:   Individual   Time In 1000   Time Out 1030   Minutes 30      Minutes: 30  Transfer/Bed mobility training: 10  Gait training:10  Neuro re education:  Therapeutic ex: 10      Alfred Early PTA, 06/26/24 at 11:52 AM             
may not be addressed while patient is currently on the rehab unit.    Patient would need to follow-up with his primary care doctor and other needed outpatient providers to address all of his chronic, subacute medical issues and all of the abnormalities seen on labs and imaging that have not been addressed during this stay on the rehab unit.    We may not follow patient daily.  Sometimes we will follow patient on an as-needed basis.  Please call or alert hospitalist if patient develops any signs or symptoms that may require physical evaluation and intervention.                
seen on labs and imaging may not be addressed while patient is currently on the rehab unit.    Patient would need to follow-up with his primary care doctor and other needed outpatient providers to address all of his chronic, subacute medical issues and all of the abnormalities seen on labs and imaging that have not been addressed during this stay on the rehab unit.    We may not follow patient daily.  Sometimes we will follow patient on an as-needed basis.  Please call or alert hospitalist if patient develops any signs or symptoms that may require physical evaluation and intervention.    I will be off service starting this Sunday.  Patient would be handled by one of my colleagues after that.    On discharge, please make sure that patient has an outpatient appointment with his primary care doctor as well as all of the other specialists that had seen here at [facility].  [facility] to arrival to [facility] admission.            
sets.   Pt tolerated well.    Donned 2# wrist weights to further address strength/endurance  Engaged pt in [employer] game for cognitive challenge- pt with 3 pieces left after puzzle was completed- good score.    Education:  Education  Education Given To: Patient  Education Provided: Plan of Care;Role of Therapy;Precautions;Safety  Education Method: Demonstration;Verbal  Education Outcome: Continued education needed    ASSESSMENT:  Activity Tolerance: Patient tolerated treatment well. Pt complains of fatigue this date. Good participation during session though. Motivated to improve condition     PLAN OF CARE:  Functional mobility training, Endurance training, Patient/Caregiver education & training, Equipment evaluation, education, & procurement, Self-Care / ADL, Home management training  Continue per OT POC for planned discharge on 6-30-24.    Patient goals : To get home  Time Frame for Long Term Goals : within 1 week patient to demonstrate progress in the following areas in order to meet long term goals as stated on the initial evaluation  Long Term Goal 1: improve self care status  Long Term Goal 2: increase strength and endurance  Long Term Goal 3: improve functional mobility for ADLS and IADLs    Therapy Time:   Individual Group Co-Treat   Time In 1430       Time Out 1500         Minutes 30             Therapeutic activities: 30 minutes     Electronically signed by:    Pradeep Loredo OT,   6/27/2024, 2:39 PM           
WFL  RUE Strength  Gross RUE Strength: WFL    Coordination, Tone, Quality of Movement:   Tone RUE  RUE Tone: Normotonic  Tone LUE  LUE Tone: Normotonic  Coordination  Movements Are Fluid And Coordinated: Yes    D/C Recommendations:    Equipment Recommendations:  OT D/C Equipment  Equipment Needed: No    Bedside commode     OT Follow Up:  OT D/C RECOMMENDATIONS  REQUIRES OT FOLLOW-UP: Yes  Type: Home OT    Home Exercise Program Provided: [x] Yes [] No  If yes, type of HEP: UE strengthening     Electronically signed by:    Pradeep Loredo OT,    6/28/2024, 4:23 PM   
intermittently (06/23/24 1055)  Distance: 30 ft (06/23/24 1055)  Ambulation  Surface: Level surface (06/24/24 1413)  Device: Rolling walker (06/24/24 1413)  Distance: 15' x 2 (06/24/24 1413)  Activity: Within Unit (06/24/24 1413)  Additional Factors: Verbal cues;Hand placement cues;Increased time to complete (06/24/24 1413)  Assistance Level: Minimal assistance (06/24/24 1413)  Gait Deviations: Slow tabitha;Unsteady gait;Narrow base of support;Path deviations (06/24/24 1413)  Skilled Clinical Factors: vc's throughout for WW management, quick impulsive movements but no LOB/instability throughout. fatigues quickly but reports minimal pain throughout, maintains TTWB. (06/24/24 1413)  Stairs:  Stairs/Curb  Stairs?: No (educated pt in multiple methods for stair negotiation) (06/23/24 1055)  W/C mobility:  Wheelchair Activities  Propulsion: No (\"A w/c will not fit through my house\") (06/23/24 1055)  Wheelchair  Surface: Level surface (06/24/24 1413)  Device: Standard wheelchair (06/24/24 1413)  Assistance Level for Propulsion: Supervision (06/24/24 1413)  Propulsion Method: Left lower extremity;Bilateral upper extremities (06/24/24 1413)  Propulsion Distance: 125' (06/24/24 1413)  Skilled Clinical Factors: good technique and sequencing throughout, quick pacing but able to manage environmental obstacles throughout without concern. (06/24/24 1413)        Pt and Family training goals-  Focus on sequencing and endurance        OCCUPATIONAL THERAPY  Grooming/Oral Hygiene  Assistance Level: Independent (06/24/24 1203)  Upper Extremity Bathing  Assistance Level: Set-up (06/24/24 1203)  Lower Extremity Bathing  Assistance Level: Supervision (06/24/24 1203)  Upper Extremity Dressing  Assistance Level: Independent (06/24/24 1203)  Lower Extremity Dressing  Assistance Level: Supervision (06/24/24 1203)  Putting On/Taking Off Footwear  Assistance Level: Supervision (06/24/24 1203)  Toileting  Skilled Clinical Factors: denied need 
with Ortho    Spinal stenosis of lumbar region with neurogenic claudication/Exacerbation of chronic back pain-lowest effective dose of pain medication  Morbid obesity-BMI is listed at 40-dietary consult regarding weight loss -severe nocturnal hypoxemia likely secondary to sleep apnea  Severe nocturnal hypoxemia-elevate head of bed at night nocturnal O2 prescribed weight loss add pulmonology consult add endocrinology consult to assist and weight loss medications after discharge      Focus on reassessing rehab goals and coordinating therapy and medical self care issues.      Monitor dependent bruising right        Electronically signed by Leila Gaines DO on 6/24/24 at 7:46 AM EDT       Leila Gaines D.O., PM&R     Attending    [phone]   [facility] [place]

## 2024-07-01 NOTE — TELEPHONE ENCOUNTER
Please approve or deny request. Thank you!    Rx requested:  Requested Prescriptions     Pending Prescriptions Disp Refills    atorvastatin (LIPITOR) 20 MG tablet [Pharmacy Med Name: Atorvastatin Calcium 20 MG Oral Tablet] 90 tablet 3     Sig: TAKE 1 TABLET BY MOUTH ONCE  DAILY    metoprolol (LOPRESSOR) 100 MG tablet [Pharmacy Med Name: Metoprolol Tartrate 100 MG Oral Tablet] 180 tablet 3     Sig: TAKE 1 TABLET BY MOUTH TWICE  DAILY    meloxicam (MOBIC) 15 MG tablet [Pharmacy Med Name: Meloxicam 15 MG Oral Tablet] 90 tablet 3     Sig: TAKE 1 TABLET BY MOUTH ONCE  DAILY    amLODIPine (NORVASC) 10 MG tablet [Pharmacy Med Name: amLODIPine Besylate 10 MG Oral Tablet] 90 tablet 3     Sig: TAKE 1 TABLET BY MOUTH DAILY         Last Office Visit:   6/10/2024      Next Visit Date:  Future Appointments   Date Time Provider Department Center   12/11/2024  8:00 AM Christiano Sapp MD [facility] Maddy Zuniga

## 2024-07-01 NOTE — TELEPHONE ENCOUNTER
Called and spoke to Ira at [facility] letting her know that Dr. Sapp will follow patient.

## 2024-07-01 NOTE — TELEPHONE ENCOUNTER
Izzy from [facility] called and is asking that if Dr. Sapp will follow for care.  They received orders for OT, PT and skilled nursing.

## 2024-07-01 NOTE — TELEPHONE ENCOUNTER
Ira from [facility] call- will proovider follow for [facility] - Skilled, OT & PT- will fax over orders to be signed- will fax [phone] once generated.

## 2024-07-02 ENCOUNTER — CARE COORDINATION (OUTPATIENT)
Dept: CARE COORDINATION | Age: 69
End: 2024-07-02

## 2024-07-02 DIAGNOSIS — Z78.9 IMPAIRED MOBILITY AND ACTIVITIES OF DAILY LIVING: Primary | ICD-10-CM

## 2024-07-02 DIAGNOSIS — Z74.09 IMPAIRED MOBILITY AND ACTIVITIES OF DAILY LIVING: Primary | ICD-10-CM

## 2024-07-02 PROCEDURE — 1111F DSCHRG MED/CURRENT MED MERGE: CPT | Performed by: FAMILY MEDICINE

## 2024-07-02 NOTE — CARE COORDINATION
information.  Plan for follow-up call in 6-10 days based on severity of symptoms and risk factors.  Plan for next call:  Symptom check      MT Huff

## 2024-07-09 ENCOUNTER — APPOINTMENT (OUTPATIENT)
Dept: ORTHOPEDIC SURGERY | Facility: CLINIC | Age: 69
End: 2024-07-09
Payer: COMMERCIAL

## 2024-07-09 ENCOUNTER — HOSPITAL ENCOUNTER (OUTPATIENT)
Dept: RADIOLOGY | Facility: HOSPITAL | Age: 69
Discharge: HOME | End: 2024-07-09
Payer: COMMERCIAL

## 2024-07-09 DIAGNOSIS — Z87.81 S/P ORIF (OPEN REDUCTION INTERNAL FIXATION) FRACTURE: Primary | ICD-10-CM

## 2024-07-09 DIAGNOSIS — Z87.81 S/P ORIF (OPEN REDUCTION INTERNAL FIXATION) FRACTURE: ICD-10-CM

## 2024-07-09 DIAGNOSIS — Z98.890 S/P ORIF (OPEN REDUCTION INTERNAL FIXATION) FRACTURE: ICD-10-CM

## 2024-07-09 DIAGNOSIS — Z98.890 S/P ORIF (OPEN REDUCTION INTERNAL FIXATION) FRACTURE: Primary | ICD-10-CM

## 2024-07-09 PROCEDURE — 73552 X-RAY EXAM OF FEMUR 2/>: CPT | Mod: RT

## 2024-07-09 PROCEDURE — 73552 X-RAY EXAM OF FEMUR 2/>: CPT | Mod: RIGHT SIDE | Performed by: RADIOLOGY

## 2024-07-09 PROCEDURE — 99024 POSTOP FOLLOW-UP VISIT: CPT | Performed by: ORTHOPAEDIC SURGERY

## 2024-07-09 RX ORDER — CYCLOBENZAPRINE HCL 10 MG
10 TABLET ORAL 3 TIMES DAILY PRN
Qty: 30 TABLET | Refills: 0 | Status: SHIPPED | OUTPATIENT
Start: 2024-07-09 | End: 2024-07-19

## 2024-07-09 RX ORDER — OXYCODONE HYDROCHLORIDE 5 MG/1
10 TABLET ORAL EVERY 6 HOURS PRN
Qty: 28 TABLET | Refills: 0 | Status: SHIPPED | OUTPATIENT
Start: 2024-07-09 | End: 2024-07-16

## 2024-07-09 NOTE — PROGRESS NOTES
History of Present Illness  No chief complaint on file.      Patient returns today for evaluation Status Post retrograde intramedullary nailing of right distal femur fracture on 6/21/2024.  The patient notes improvement in pain.  The patient denies any significant numbness or tingling of calf pain.       Exam  side: right Lower Extremity :  Incision healing nicely, no erythema or drainage, surgical staples in place   Intact flexion and extension of digits  Neurovascular exam normal distally  2+ dorsalis pedis pulse and good cap refill     Radiographs  To my interpretation right femur films today show a retrograde nail in stable and satisfactory position.  The distal third femoral shaft fracture is in excellent alignment.  There is some early callus formation.  There are degenerative changes seen at the knee.       Assessment  Patient status post retrograde intramedullary nailing of a right distal femur fracture     Plan  Staples removed  Refilled oxycodone and flexeril, decreasing oxycodone to 1, 10mg tab every 6 hours x 7 days.  Immobilization: none  Weight bearing: NWB (Non Weight Bearing)  Reviewed rehab /return to activities  Follow up 1 month with XR  All questions answered

## 2024-07-10 ENCOUNTER — OFFICE VISIT (OUTPATIENT)
Dept: FAMILY MEDICINE CLINIC | Age: 69
End: 2024-07-10

## 2024-07-10 VITALS
TEMPERATURE: 98 F | SYSTOLIC BLOOD PRESSURE: 132 MMHG | BODY MASS INDEX: 40.89 KG/M2 | DIASTOLIC BLOOD PRESSURE: 72 MMHG | HEIGHT: 72 IN | HEART RATE: 92 BPM | OXYGEN SATURATION: 94 %

## 2024-07-10 DIAGNOSIS — S72.401A CLOSED FRACTURE OF DISTAL END OF RIGHT FEMUR, UNSPECIFIED FRACTURE MORPHOLOGY, INITIAL ENCOUNTER (HCC): Primary | ICD-10-CM

## 2024-07-10 DIAGNOSIS — E11.9 NEW ONSET TYPE 2 DIABETES MELLITUS (HCC): ICD-10-CM

## 2024-07-10 DIAGNOSIS — Z09 HOSPITAL DISCHARGE FOLLOW-UP: ICD-10-CM

## 2024-07-10 RX ORDER — BLOOD SUGAR DIAGNOSTIC
1 STRIP MISCELLANEOUS 2 TIMES DAILY
Qty: 100 EACH | Refills: 3 | Status: SHIPPED | OUTPATIENT
Start: 2024-07-10

## 2024-07-10 ASSESSMENT — ENCOUNTER SYMPTOMS
SHORTNESS OF BREATH: 0
CHEST TIGHTNESS: 0

## 2024-07-10 NOTE — PROGRESS NOTES
[facility]  [facility]  [address] [address]  Dept: [phone]  Dept Fax: [phone]  Loc: [phone]                             Hospital Follow Up:      Kane Gibbs   YOB: 1955    Date of Office Visit:  7/10/2024  Date of Hospital Admission: 6/22/24  Date of Hospital Discharge: 6/30/24      Patient Active Problem List   Diagnosis    Hypertension    Primary osteoarthritis of right knee    Closed fracture of distal end of right femur, unspecified fracture morphology, initial encounter ([facility])    Impaired mobility ADLs sp right femur Fx    Post-op pain    Hyperglycemia    Postoperative anemia    Closed fracture of proximal end of right fibula with routine healing    Spinal stenosis of lumbar region with neurogenic claudication    Exacerbation of chronic back pain    Lumbosacral disc disease    New onset type 2 diabetes mellitus (HCC)    Morbid obesity ([facility])    Nocturnal hypoxia       No Known Allergies    Medications marked \"taking\" at this time  Outpatient Medications Marked as Taking for the 7/10/24 encounter (Office Visit) with Kathy Mcelroy APRN - CNP   Medication Sig Dispense Refill    blood glucose test strips (ONETOUCH VERIO) strip 1 each by In Vitro route 2 times daily As needed. 100 each 3    atorvastatin (LIPITOR) 20 MG tablet TAKE 1 TABLET BY MOUTH ONCE  DAILY 90 tablet 3    metoprolol (LOPRESSOR) 100 MG tablet TAKE 1 TABLET BY MOUTH TWICE  DAILY 180 tablet 3    amLODIPine (NORVASC) 10 MG tablet TAKE 1 TABLET BY MOUTH DAILY 90 tablet 3    aspirin 81 MG chewable tablet Take 1 tablet by mouth in the morning and at bedtime for 71 doses 30 tablet 3    cyclobenzaprine (FLEXERIL) 10 MG tablet Take 1 tablet by mouth 3 times daily as needed for Muscle spasms 60 tablet 0    dulaglutide (TRULICITY)

## 2024-07-15 ENCOUNTER — OFFICE VISIT (OUTPATIENT)
Dept: PULMONOLOGY | Age: 69
End: 2024-07-15
Payer: MEDICARE

## 2024-07-15 VITALS — HEART RATE: 85 BPM | OXYGEN SATURATION: 98 % | SYSTOLIC BLOOD PRESSURE: 134 MMHG | DIASTOLIC BLOOD PRESSURE: 78 MMHG

## 2024-07-15 DIAGNOSIS — R09.02 HYPOXIA: ICD-10-CM

## 2024-07-15 DIAGNOSIS — E66.01 MORBID OBESITY (HCC): Primary | ICD-10-CM

## 2024-07-15 PROCEDURE — 3075F SYST BP GE 130 - 139MM HG: CPT | Performed by: INTERNAL MEDICINE

## 2024-07-15 PROCEDURE — G8427 DOCREV CUR MEDS BY ELIG CLIN: HCPCS | Performed by: INTERNAL MEDICINE

## 2024-07-15 PROCEDURE — 99213 OFFICE O/P EST LOW 20 MIN: CPT | Performed by: INTERNAL MEDICINE

## 2024-07-15 PROCEDURE — G8417 CALC BMI ABV UP PARAM F/U: HCPCS | Performed by: INTERNAL MEDICINE

## 2024-07-15 PROCEDURE — 1036F TOBACCO NON-USER: CPT | Performed by: INTERNAL MEDICINE

## 2024-07-15 PROCEDURE — 1111F DSCHRG MED/CURRENT MED MERGE: CPT | Performed by: INTERNAL MEDICINE

## 2024-07-15 PROCEDURE — 1123F ACP DISCUSS/DSCN MKR DOCD: CPT | Performed by: INTERNAL MEDICINE

## 2024-07-15 PROCEDURE — 3017F COLORECTAL CA SCREEN DOC REV: CPT | Performed by: INTERNAL MEDICINE

## 2024-07-15 PROCEDURE — 3078F DIAST BP <80 MM HG: CPT | Performed by: INTERNAL MEDICINE

## 2024-07-15 NOTE — PROGRESS NOTES
Subjective:             Kane Gibbs is a 68 y.o. male who complains today of:     Chief Complaint   Patient presents with    Follow-Up from Hospital     2wk f/u on nocturnal hypoxia, r/o TUSHAR       HPI  This is a 68-year-old male with morbid obesity, prediabetic who was admitted to [facility] because he had fall and hip fracture underwent surgery. He had intramedullary nail placement. He is wheel chair but he is healing well     He underwent for overnight pulse oximetry which shows low O2 saturation below 89% for 1 hour and 4 minutes.  He was sent home with 2 lit O2.    He has been snoring . He sleep well ,not taking nap , not driving . He has O2 but not using.    Allergies  Patient has no known allergies.  Past Medical History:   Diagnosis Date    Hyperglycemia 06/22/2024    Hypertension     Lumbosacral disc disease     Osteoarthritis      Past Surgical History:   Procedure Laterality Date    FERTILITY SURGERY Right 6/21/2024    RIGHT FEMUR INTRAMEDULLARY NAIL RETROGRADE. performed by Gilmar Weiss MD at [facility]     Family History   Problem Relation Age of Onset    High Blood Pressure Mother     Diabetes Mother     Cancer Mother         ovarian    High Blood Pressure Father     Diabetes Father     Cancer Father         lung     Social History     Socioeconomic History    Marital status:      Spouse name: Not on file    Number of children: 1    Years of education: Not on file    Highest education level: Not on file   Occupational History    Not on file   Tobacco Use    Smoking status: Never    Smokeless tobacco: Never   Substance and Sexual Activity    Alcohol use: Not on file    Drug use: Not on file    Sexual activity: Not on file   Other Topics Concern    Not on file   Social History Narrative    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

## 2024-07-18 ENCOUNTER — CARE COORDINATION (OUTPATIENT)
Dept: CARE COORDINATION | Age: 69
End: 2024-07-18

## 2024-07-18 NOTE — CARE COORDINATION
Care Transitions Note    Final Call     Patient Current Location:  Home: [address] [place] [address]  [place] [address]    Care Transition Nurse contacted the patient by telephone. Verified name and  as identifiers.    Patient graduated from the Care Transitions program on 24.  Patient/family has the ability to self manage at this time..      Advance Care Planning:   Does patient have an Advance Directive: reviewed and current.    Handoff:   Patient was not referred to the ACM team due to patient declined services.      Care Summary Note: Spoke with patient today 24 for final ADRIANA/hospital discharge (low risk) follow up for s/p femur intramedullary nail retrograde after a workplace fall.     Patient states right hip pain is improving since last CTN call and admits pain is 2-3/10 in severity on pain scale.     States post op dressing has vilma removed and incision is dry/clean and JOSE G. Patient states he had a nurse visit today with [facility]. States post op follow up with Dr. LAM Weiss (ortho surg) is 24.     Patient denies any shortness of breath, chest pain, chest discomfort, calf pain,nausea, vomiting, chills or fever. Reiterated s/s of infection and knowing when to seek medical attention.     Confirmed with patient  he completed HFU appt with PCP on 7/10/24. Patient denies any further needs or concerns and in agreement to final call. CTN signing off.     Assessments:  Care Transitions Subsequent and Final Call    Subsequent and Final Calls  Do you have any ongoing symptoms?: Yes  Patient-reported symptoms: Pain  Have your medications changed?: No  Do you have any questions related to your medications?: No  Do you currently have any active services?: Yes  Are you currently active with any services?: Home Health  Do you have any needs or concerns that I can assist you with?: No  Identified Barriers: Other, Lack of Motivation  Care Transitions Interventions     Other Services: Declined (Comment: 24 Declines

## 2024-07-22 ENCOUNTER — OFFICE VISIT (OUTPATIENT)
Dept: ENDOCRINOLOGY | Age: 69
End: 2024-07-22
Payer: MEDICARE

## 2024-07-22 VITALS
SYSTOLIC BLOOD PRESSURE: 124 MMHG | BODY MASS INDEX: 40.77 KG/M2 | WEIGHT: 301 LBS | HEIGHT: 72 IN | HEART RATE: 71 BPM | OXYGEN SATURATION: 93 % | DIASTOLIC BLOOD PRESSURE: 80 MMHG

## 2024-07-22 DIAGNOSIS — E11.9 NEW ONSET TYPE 2 DIABETES MELLITUS (HCC): Primary | ICD-10-CM

## 2024-07-22 LAB
CHP ED QC CHECK: NORMAL
GLUCOSE BLD-MCNC: 130 MG/DL

## 2024-07-22 PROCEDURE — 82962 GLUCOSE BLOOD TEST: CPT | Performed by: PHYSICIAN ASSISTANT

## 2024-07-22 PROCEDURE — G8417 CALC BMI ABV UP PARAM F/U: HCPCS | Performed by: PHYSICIAN ASSISTANT

## 2024-07-22 PROCEDURE — 1036F TOBACCO NON-USER: CPT | Performed by: PHYSICIAN ASSISTANT

## 2024-07-22 PROCEDURE — 99214 OFFICE O/P EST MOD 30 MIN: CPT | Performed by: PHYSICIAN ASSISTANT

## 2024-07-22 PROCEDURE — 1123F ACP DISCUSS/DSCN MKR DOCD: CPT | Performed by: PHYSICIAN ASSISTANT

## 2024-07-22 PROCEDURE — 3017F COLORECTAL CA SCREEN DOC REV: CPT | Performed by: PHYSICIAN ASSISTANT

## 2024-07-22 PROCEDURE — 3074F SYST BP LT 130 MM HG: CPT | Performed by: PHYSICIAN ASSISTANT

## 2024-07-22 PROCEDURE — 1111F DSCHRG MED/CURRENT MED MERGE: CPT | Performed by: PHYSICIAN ASSISTANT

## 2024-07-22 PROCEDURE — G8427 DOCREV CUR MEDS BY ELIG CLIN: HCPCS | Performed by: PHYSICIAN ASSISTANT

## 2024-07-22 PROCEDURE — 2022F DILAT RTA XM EVC RTNOPTHY: CPT | Performed by: PHYSICIAN ASSISTANT

## 2024-07-22 PROCEDURE — 3079F DIAST BP 80-89 MM HG: CPT | Performed by: PHYSICIAN ASSISTANT

## 2024-07-22 PROCEDURE — 3051F HG A1C>EQUAL 7.0%<8.0%: CPT | Performed by: PHYSICIAN ASSISTANT

## 2024-07-22 RX ORDER — DULAGLUTIDE 1.5 MG/.5ML
1.5 INJECTION, SOLUTION SUBCUTANEOUS WEEKLY
Qty: 12 ADJUSTABLE DOSE PRE-FILLED PEN SYRINGE | Refills: 3 | Status: SHIPPED | OUTPATIENT
Start: 2024-07-22

## 2024-07-22 ASSESSMENT — ENCOUNTER SYMPTOMS
SORE THROAT: 0
WHEEZING: 0
RHINORRHEA: 0
NAUSEA: 0
VOMITING: 0
SHORTNESS OF BREATH: 0
SINUS PRESSURE: 0
ABDOMINAL PAIN: 0
COUGH: 0
DIARRHEA: 0

## 2024-07-22 NOTE — PROGRESS NOTES
rhythm.      Heart sounds: Normal heart sounds.   Pulmonary:      Effort: Pulmonary effort is normal.      Breath sounds: Normal breath sounds.   Abdominal:      General: Bowel sounds are normal.      Palpations: Abdomen is soft.   Musculoskeletal:         General: Normal range of motion.      Cervical back: Normal range of motion and neck supple.   Skin:     General: Skin is warm and dry.   Neurological:      General: No focal deficit present.      Mental Status: He is alert and oriented to person, place, and time.   Psychiatric:         Mood and Affect: Mood normal.           Reviewed with the patient: current clinical status, medications, activities and diet.      Side effects, adverse effects of the medication prescribed today, as well as treatment plan/ rationale and result expectations have been discussed with the patient who expresses understanding and desires to proceed.     Close follow up to evaluate treatment results and for coordination of care.  I have reviewed the patient's medical history in detail and updated the computerized patient record.

## 2024-07-22 NOTE — PATIENT INSTRUCTIONS
Endocrinology-    Check your blood sugars 4 times a day, before meals and at night  Document these numbers in a blood glucose log and bring them with you to your follow-up appointment.  If you are prescribed insulin, Do not take your mealtime insulin if your blood sugars less than 120   Call our office if you have blood sugars less than 80 or greater then 300 on two or more occasions  Call our office if you have any questions regarding your blood sugars or insulin dosing regiment  Signs of low blood sugar may include tremors, feeling shaky, sweating, dizziness, confusion and weakness. Check your blood sugar immediatly if you have any of these symptoms.     The plan as discussed at your appointment-    Increase Trulicity 1.5 mg injected weekly    Repeat labs one week before your next appointment   Follow up in 3 months

## 2024-08-12 DIAGNOSIS — Z98.890 S/P ORIF (OPEN REDUCTION INTERNAL FIXATION) FRACTURE: Primary | ICD-10-CM

## 2024-08-12 DIAGNOSIS — Z87.81 S/P ORIF (OPEN REDUCTION INTERNAL FIXATION) FRACTURE: Primary | ICD-10-CM

## 2024-08-13 ENCOUNTER — APPOINTMENT (OUTPATIENT)
Dept: ORTHOPEDIC SURGERY | Facility: CLINIC | Age: 69
End: 2024-08-13
Payer: COMMERCIAL

## 2024-08-13 ENCOUNTER — HOSPITAL ENCOUNTER (OUTPATIENT)
Dept: RADIOLOGY | Facility: HOSPITAL | Age: 69
Discharge: HOME | End: 2024-08-13
Payer: COMMERCIAL

## 2024-08-13 DIAGNOSIS — Z98.890 S/P ORIF (OPEN REDUCTION INTERNAL FIXATION) FRACTURE: ICD-10-CM

## 2024-08-13 DIAGNOSIS — Z87.81 S/P ORIF (OPEN REDUCTION INTERNAL FIXATION) FRACTURE: ICD-10-CM

## 2024-08-13 PROCEDURE — 99024 POSTOP FOLLOW-UP VISIT: CPT | Performed by: ORTHOPAEDIC SURGERY

## 2024-08-13 PROCEDURE — 1159F MED LIST DOCD IN RCRD: CPT | Performed by: ORTHOPAEDIC SURGERY

## 2024-08-13 PROCEDURE — 73552 X-RAY EXAM OF FEMUR 2/>: CPT | Mod: RIGHT SIDE | Performed by: RADIOLOGY

## 2024-08-13 PROCEDURE — 73552 X-RAY EXAM OF FEMUR 2/>: CPT | Mod: RT

## 2024-08-13 RX ORDER — DULAGLUTIDE 1.5 MG/.5ML
0.5 INJECTION, SOLUTION SUBCUTANEOUS
COMMUNITY
Start: 2024-07-22

## 2024-08-13 RX ORDER — AMLODIPINE BESYLATE 10 MG/1
1 TABLET ORAL DAILY
COMMUNITY
Start: 2024-07-01

## 2024-08-13 RX ORDER — METFORMIN HYDROCHLORIDE 500 MG/1
1 TABLET ORAL
COMMUNITY
Start: 2024-06-27

## 2024-08-13 RX ORDER — NAPROXEN SODIUM 220 MG/1
81 TABLET, FILM COATED ORAL 2 TIMES DAILY
COMMUNITY
Start: 2024-06-30

## 2024-08-13 RX ORDER — OXYCODONE HYDROCHLORIDE 10 MG/1
1 TABLET ORAL EVERY 4 HOURS PRN
COMMUNITY
Start: 2024-07-01

## 2024-08-13 RX ORDER — MELOXICAM 15 MG/1
TABLET ORAL
COMMUNITY
Start: 2024-05-04

## 2024-08-13 RX ORDER — METOPROLOL TARTRATE 100 MG/1
1 TABLET ORAL 2 TIMES DAILY
COMMUNITY
Start: 2024-07-01

## 2024-08-13 RX ORDER — ATORVASTATIN CALCIUM 20 MG/1
1 TABLET, FILM COATED ORAL DAILY
COMMUNITY
Start: 2024-07-01

## 2024-08-13 ASSESSMENT — PAIN - FUNCTIONAL ASSESSMENT: PAIN_FUNCTIONAL_ASSESSMENT: NO/DENIES PAIN

## 2024-08-13 NOTE — PROGRESS NOTES
History of Present Illness  No chief complaint on file.      Patient returns today for evaluation Status Post medullary nailing of a right distal femur fracture.  The patient notes improvement in pain.  The patient denies any significant numbness or tingling of calf pain.  He has been toe-touch weightbearing     Exam  side: right Lower Extremity :  Incision healing nicely, no erythema or drainage  Intact flexion and extension of digits  Neurovascular exam normal distally  2+ dorsalis pedis pulse and good cap refill     Radiographs  Right femur films to my interpretation show a retrograde nail in stable and satisfactory position.  The distal femoral fracture is in satisfactory position.  There is callus formation seen.  The fracture lines are less clearly distinct.       Assessment  Patient status post for medullary nailing of a right distal femur fracture     Plan  Weight bearing: Advance to WBAT (Weight Bearing as Tolerated)  Reviewed rehab /return to activities, I will order physical therapy for gait training  Follow up 1 month for recheck and x-rays, sooner if there is any problems  Questions answered

## 2024-08-14 ENCOUNTER — TELEPHONE (OUTPATIENT)
Dept: ORTHOPEDIC SURGERY | Facility: CLINIC | Age: 69
End: 2024-08-14
Payer: COMMERCIAL

## 2024-08-14 NOTE — TELEPHONE ENCOUNTER
HAS A QUESTION ABOUT HOME HEALTH CARE REFFERAL     PLEASE CALL JHOAN AT [facility] [phone]

## 2024-08-21 DIAGNOSIS — R09.02 HYPOXIA: ICD-10-CM

## 2024-08-21 LAB
ARTIFACT EVENTS (PULSE): NORMAL
ARTIFACT EVENTS: NORMAL
AVERAGE PULSE: NORMAL
AWAKE SPO2: NORMAL
BASAL SPO2: NORMAL
BRADYCARDIA TIME: NORMAL
DELTA SPO2: NORMAL
HIGH PULSE: NORMAL
HIGH SPO2: NORMAL
LOW PULSE: NORMAL
LOW SPO2: NORMAL
OXYGEN DESATURATION EVENTS (3%): NORMAL
OXYGEN DESATURATION INDEX (ODI): NORMAL
PERCENT TIME IN BRADYCARDIA: NORMAL
PERCENT TIME IN TACHYCARDIA: NORMAL
TACHYCARDIA TIME: NORMAL
TIME <= 88%: NORMAL
TIME <= 89%: NORMAL
TIME CONSECUTIVE <= 88%: NORMAL

## 2024-09-11 DIAGNOSIS — Z87.81 S/P ORIF (OPEN REDUCTION INTERNAL FIXATION) FRACTURE: Primary | ICD-10-CM

## 2024-09-11 DIAGNOSIS — Z98.890 S/P ORIF (OPEN REDUCTION INTERNAL FIXATION) FRACTURE: Primary | ICD-10-CM

## 2024-09-17 ENCOUNTER — APPOINTMENT (OUTPATIENT)
Dept: ORTHOPEDIC SURGERY | Facility: CLINIC | Age: 69
End: 2024-09-17
Payer: COMMERCIAL

## 2024-09-17 ENCOUNTER — HOSPITAL ENCOUNTER (OUTPATIENT)
Dept: RADIOLOGY | Facility: HOSPITAL | Age: 69
Discharge: HOME | End: 2024-09-17
Payer: COMMERCIAL

## 2024-09-17 DIAGNOSIS — Z98.890 S/P ORIF (OPEN REDUCTION INTERNAL FIXATION) FRACTURE: ICD-10-CM

## 2024-09-17 DIAGNOSIS — Z98.890 S/P ORIF (OPEN REDUCTION INTERNAL FIXATION) FRACTURE: Primary | ICD-10-CM

## 2024-09-17 DIAGNOSIS — Z87.81 S/P ORIF (OPEN REDUCTION INTERNAL FIXATION) FRACTURE: Primary | ICD-10-CM

## 2024-09-17 DIAGNOSIS — Z87.81 S/P ORIF (OPEN REDUCTION INTERNAL FIXATION) FRACTURE: ICD-10-CM

## 2024-09-17 PROCEDURE — 73552 X-RAY EXAM OF FEMUR 2/>: CPT | Mod: RT

## 2024-09-17 NOTE — PROGRESS NOTES
History of Present Illness  No chief complaint on file.      Patient returns today for evaluation Status Post retrograde nailing of right femoral shaft fracture on 6/21/2024.  The patient notes improvement in pain.  The patient denies any significant numbness or tingling of calf pain.  He notes no pain however he has an issue with strength and ambulation.       Exam  side: right Lower Extremity :  Incision healing nicely, no erythema or drainage  Intact flexion and extension of digits  Neurovascular exam normal distally  2+ dorsalis pedis pulse and good cap refill  No pain with rotation of the hip, he can fully extend and flex at the right knee  He has difficulty strength and ambulation of the right lower extremity     Radiographs  XR femur right 2+ views  Narrative: Interpreted By:  David Randle,   STUDY:  XR FEMUR RIGHT 2+ VIEWS      INDICATION:  Signs/Symptoms:pain.      COMPARISON:  July 9      ACCESSION NUMBER(S):  [id number]      ORDERING CLINICIAN:  RONALD CHRISTINE      FINDINGS:  Postsurgical changes status post ORIF of the right distal diaphyseal  fracture with long intramedullary jarrell. The superior craniad locking  screw has fractured in the interval but there is no evidence of  hardware change in alignment.      Impression: Interval fracturing of the superior locking screw of the right  femoral fixation hardware. No evidence of other hardware complication  across the distal femoral fracture which continues to heal.      Signed by: David Randle 8/15/2024 7:12 AM  Dictation workstation:   [id number]       Assessment  Patient status post open reduction and internal fixation of a femoral shaft fracture     Plan  Updated C9 and [employer] 14 for continued physical therapy  Handicap parking permit  Immobilization: None  Weight bearing: WBAT (Weight Bearing as Tolerated)  Reviewed rehab /return to activities  Follow up 1 month

## 2024-10-10 DIAGNOSIS — Z98.890 S/P ORIF (OPEN REDUCTION INTERNAL FIXATION) FRACTURE: Primary | ICD-10-CM

## 2024-10-10 DIAGNOSIS — Z87.81 S/P ORIF (OPEN REDUCTION INTERNAL FIXATION) FRACTURE: Primary | ICD-10-CM

## 2024-10-15 DIAGNOSIS — E11.9 NEW ONSET TYPE 2 DIABETES MELLITUS (HCC): ICD-10-CM

## 2024-10-15 LAB
ALBUMIN SERPL-MCNC: 4.8 G/DL (ref 3.5–4.6)
ALP SERPL-CCNC: 127 U/L (ref 35–104)
ALT SERPL-CCNC: 45 U/L (ref 0–41)
ANION GAP SERPL CALCULATED.3IONS-SCNC: 11 MEQ/L (ref 9–15)
AST SERPL-CCNC: 31 U/L (ref 0–40)
BILIRUB SERPL-MCNC: 1 MG/DL (ref 0.2–0.7)
BUN SERPL-MCNC: 18 MG/DL (ref 8–23)
CALCIUM SERPL-MCNC: 9.6 MG/DL (ref 8.5–9.9)
CHLORIDE SERPL-SCNC: 102 MEQ/L (ref 95–107)
CO2 SERPL-SCNC: 27 MEQ/L (ref 20–31)
CREAT SERPL-MCNC: 0.99 MG/DL (ref 0.7–1.2)
CREAT UR-MCNC: 95.7 MG/DL
ESTIMATED AVERAGE GLUCOSE: 137 MG/DL
GLOBULIN SER CALC-MCNC: 3 G/DL (ref 2.3–3.5)
GLUCOSE SERPL-MCNC: 102 MG/DL (ref 70–99)
HBA1C MFR BLD: 6.4 % (ref 4–6)
MICROALBUMIN UR-MCNC: 19.7 MG/DL
MICROALBUMIN/CREAT UR-RTO: 205.9 MG/G (ref 0–30)
POTASSIUM SERPL-SCNC: 4.5 MEQ/L (ref 3.4–4.9)
PROT SERPL-MCNC: 7.8 G/DL (ref 6.3–8)
SODIUM SERPL-SCNC: 140 MEQ/L (ref 135–144)

## 2024-10-16 NOTE — PROGRESS NOTES
History of Present Illness  Chief Complaint   Patient presents with    Right Thigh - Follow-up, Post-op, Worker's Compensation     Femur, femoral shaft, xray       Patient returns today for evaluation Status Post retrograde nailing of right femoral shaft fracture on 6/21/2024.  The patient notes knee and lateral femoral condylar pain.  He is on a walker.  The patient denies any significant numbness or tingling of calf pain.  Right knee lateral pain, possible screw head       Exam  side: right Lower Extremity :  Incision healing nicely, no erythema or drainage  Intact flexion and extension of digits  Neurovascular exam normal distally  2+ dorsalis pedis pulse and good cap refill  Pain to right knee lateral femoral condyle, possible screw head.  With knee flexion and extension there is some crepitus.  Knee range of motion: 0-110 degrees  He has medial and lateral joint line pain.  He has some parapatellar pain.     Radiographs  XR femur right 2+ views  Interpreted By:  Pola Morgan,   STUDY:  XR FEMUR RIGHT 2+ VIEWS; ; 10/17/2024 8:30 am      INDICATION:  Signs/Symptoms:pain.      ACCESSION NUMBER(S):  [id number]      ORDERING CLINICIAN:  POLA MORGAN      FINDINGS:  Right femur films show a retrograde femoral nail in stable and  satisfactory position. The proximal locking screw has broken. The  fracture shows bridging callus in all planes. No loss of position.  There are degenerative changes seen in the right knee.          Signed by: Pola Morgan 10/17/2024 8:50 AM  Dictation workstation:   [id number]       Assessment  Patient status post retrograde nailing of a right femoral shaft fracture  Right knee pain secondary to osteoarthrosis     Plan  CT right knee to see if there is screw head prominence and to assess healing.  Cortisone injection right knee  I will need to fill out the appropriate paperwork for the above procedures through the [place] of Worker's Compensation.  He cannot take anti-inflammatory  medications due to kidney dysfunction  Ultram as needed  Immobilization: none  Weight bearing: WBAT (Weight Bearing as Tolerated)  Reviewed rehab /return to activities  Follow up after Ct scan

## 2024-10-17 ENCOUNTER — HOSPITAL ENCOUNTER (OUTPATIENT)
Dept: RADIOLOGY | Facility: CLINIC | Age: 69
Discharge: HOME | End: 2024-10-17
Payer: COMMERCIAL

## 2024-10-17 ENCOUNTER — OFFICE VISIT (OUTPATIENT)
Dept: ORTHOPEDIC SURGERY | Facility: CLINIC | Age: 69
End: 2024-10-17
Payer: COMMERCIAL

## 2024-10-17 DIAGNOSIS — Z98.890 S/P ORIF (OPEN REDUCTION INTERNAL FIXATION) FRACTURE: Primary | ICD-10-CM

## 2024-10-17 DIAGNOSIS — Z87.81 S/P ORIF (OPEN REDUCTION INTERNAL FIXATION) FRACTURE: Primary | ICD-10-CM

## 2024-10-17 DIAGNOSIS — Z87.81 S/P ORIF (OPEN REDUCTION INTERNAL FIXATION) FRACTURE: ICD-10-CM

## 2024-10-17 DIAGNOSIS — Z98.890 S/P ORIF (OPEN REDUCTION INTERNAL FIXATION) FRACTURE: ICD-10-CM

## 2024-10-17 PROCEDURE — 73552 X-RAY EXAM OF FEMUR 2/>: CPT | Mod: RT

## 2024-10-17 PROCEDURE — 99211 OFF/OP EST MAY X REQ PHY/QHP: CPT | Performed by: ORTHOPAEDIC SURGERY

## 2024-10-17 RX ORDER — TRAMADOL HYDROCHLORIDE 50 MG/1
50 TABLET ORAL EVERY 8 HOURS PRN
Qty: 21 TABLET | Refills: 0 | Status: SHIPPED | OUTPATIENT
Start: 2024-10-17 | End: 2024-10-24

## 2024-10-17 NOTE — LETTER
October 17, 2024     Kamari Gongora MD  [address] [facility] [address]    Patient: Enrike Brooks   YOB: 1955   Date of Visit: 10/17/2024       Dear Dr. Kamari Gongora MD:    Thank you for referring Enrike Brooks to me for evaluation. Below are my notes for this consultation.  If you have questions, please do not hesitate to call me. I look forward to following your patient along with you.       Sincerely,     Pola Morgan MD      CC: No Recipients  ______________________________________________________________________________________      History of Present Illness  Chief Complaint   Patient presents with   • Right Thigh - Follow-up, Post-op, Worker's Compensation     Femur, femoral shaft, xray       Patient returns today for evaluation Status Post retrograde nailing of right femoral shaft fracture on 6/21/2024.  The patient notes knee and lateral femoral condylar pain.  He is on a walker.  The patient denies any significant numbness or tingling of calf pain.  Right knee lateral pain, possible screw head       Exam  side: right Lower Extremity :  Incision healing nicely, no erythema or drainage  Intact flexion and extension of digits  Neurovascular exam normal distally  2+ dorsalis pedis pulse and good cap refill  Pain to right knee lateral femoral condyle, possible screw head.  With knee flexion and extension there is some crepitus.  Knee range of motion: 0-110 degrees  He has medial and lateral joint line pain.  He has some parapatellar pain.     Radiographs  XR femur right 2+ views  Interpreted By:  Pola Morgan,   STUDY:  XR FEMUR RIGHT 2+ VIEWS; ; 10/17/2024 8:30 am      INDICATION:  Signs/Symptoms:pain.      ACCESSION NUMBER(S):  [id number]      ORDERING CLINICIAN:  POLA MORGAN      FINDINGS:  Right femur films show a retrograde femoral nail in stable and  satisfactory position. The proximal locking screw has broken. The  fracture shows  bridging callus in all planes. No loss of position.  There are degenerative changes seen in the right knee.          Signed by: Pola Morgan 10/17/2024 8:50 AM  Dictation workstation:   [id number]       Assessment  Patient status post retrograde nailing of a right femoral shaft fracture  Right knee pain secondary to osteoarthrosis     Plan  CT right knee to see if there is screw head prominence and to assess healing.  Cortisone injection right knee  I will need to fill out the appropriate paperwork for the above procedures through the [place] of Worker's Compensation.  He cannot take anti-inflammatory medications due to kidney dysfunction  Ultram as needed  Immobilization: none  Weight bearing: WBAT (Weight Bearing as Tolerated)  Reviewed rehab /return to activities  Follow up after Ct scan

## 2024-10-22 ENCOUNTER — OFFICE VISIT (OUTPATIENT)
Dept: ENDOCRINOLOGY | Age: 69
End: 2024-10-22
Payer: MEDICARE

## 2024-10-22 ENCOUNTER — TELEPHONE (OUTPATIENT)
Dept: ENDOCRINOLOGY | Age: 69
End: 2024-10-22

## 2024-10-22 VITALS
RESPIRATION RATE: 16 BRPM | BODY MASS INDEX: 39.49 KG/M2 | WEIGHT: 298 LBS | HEIGHT: 73 IN | HEART RATE: 62 BPM | DIASTOLIC BLOOD PRESSURE: 72 MMHG | SYSTOLIC BLOOD PRESSURE: 146 MMHG

## 2024-10-22 DIAGNOSIS — E11.65 TYPE 2 DIABETES MELLITUS WITH HYPERGLYCEMIA, WITHOUT LONG-TERM CURRENT USE OF INSULIN (HCC): Primary | ICD-10-CM

## 2024-10-22 PROBLEM — E11.9 NEW ONSET TYPE 2 DIABETES MELLITUS (HCC): Status: RESOLVED | Noted: 2024-06-27 | Resolved: 2024-10-22

## 2024-10-22 PROBLEM — R73.9 HYPERGLYCEMIA: Status: RESOLVED | Noted: 2024-06-22 | Resolved: 2024-10-22

## 2024-10-22 LAB
CHP ED QC CHECK: NORMAL
GLUCOSE BLD-MCNC: 101 MG/DL

## 2024-10-22 PROCEDURE — 3077F SYST BP >= 140 MM HG: CPT | Performed by: PHYSICIAN ASSISTANT

## 2024-10-22 PROCEDURE — 1123F ACP DISCUSS/DSCN MKR DOCD: CPT | Performed by: PHYSICIAN ASSISTANT

## 2024-10-22 PROCEDURE — 3017F COLORECTAL CA SCREEN DOC REV: CPT | Performed by: PHYSICIAN ASSISTANT

## 2024-10-22 PROCEDURE — G8484 FLU IMMUNIZE NO ADMIN: HCPCS | Performed by: PHYSICIAN ASSISTANT

## 2024-10-22 PROCEDURE — 99214 OFFICE O/P EST MOD 30 MIN: CPT | Performed by: PHYSICIAN ASSISTANT

## 2024-10-22 PROCEDURE — 2022F DILAT RTA XM EVC RTNOPTHY: CPT | Performed by: PHYSICIAN ASSISTANT

## 2024-10-22 PROCEDURE — 3044F HG A1C LEVEL LT 7.0%: CPT | Performed by: PHYSICIAN ASSISTANT

## 2024-10-22 PROCEDURE — 1036F TOBACCO NON-USER: CPT | Performed by: PHYSICIAN ASSISTANT

## 2024-10-22 PROCEDURE — 3078F DIAST BP <80 MM HG: CPT | Performed by: PHYSICIAN ASSISTANT

## 2024-10-22 PROCEDURE — 82962 GLUCOSE BLOOD TEST: CPT | Performed by: PHYSICIAN ASSISTANT

## 2024-10-22 PROCEDURE — G8427 DOCREV CUR MEDS BY ELIG CLIN: HCPCS | Performed by: PHYSICIAN ASSISTANT

## 2024-10-22 PROCEDURE — G8417 CALC BMI ABV UP PARAM F/U: HCPCS | Performed by: PHYSICIAN ASSISTANT

## 2024-10-22 RX ORDER — TRAMADOL HYDROCHLORIDE 50 MG/1
50 TABLET ORAL EVERY 8 HOURS PRN
COMMUNITY
Start: 2024-10-17 | End: 2024-10-24

## 2024-10-22 ASSESSMENT — ENCOUNTER SYMPTOMS
COUGH: 0
SORE THROAT: 0
RHINORRHEA: 0
VOMITING: 0
NAUSEA: 0
SHORTNESS OF BREATH: 0
ABDOMINAL PAIN: 0
SINUS PRESSURE: 0
WHEEZING: 0
DIARRHEA: 0

## 2024-10-22 NOTE — TELEPHONE ENCOUNTER
Pateint calling back wanting to fill the 10 mg that you discuss with him at appointment today only see 0.5 and 1.5 trulicity please advise .     [facility]

## 2024-10-22 NOTE — PROGRESS NOTES
10/15/2024    LABGLOM 82.6 10/15/2024    GFRAA >60.0 06/22/2022    GLOB 3.0 10/15/2024     Lab Results   Component Value Date    WBC 6.2 06/24/2024    HGB 10.8 (L) 06/24/2024    HCT 33.4 (L) 06/24/2024    MCV 87.9 06/24/2024     06/24/2024     Lab Results   Component Value Date    LABA1C 6.4 (H) 10/15/2024    LABA1C 7.0 (H) 06/24/2024      Latest Reference Range & Units 10/15/24 12:02   Creatinine, Ur Not Established mg/dL 95.7   Microalb/Creat Ratio POC 0.0 - 30.0 mg/G 205.9 (H)   Microalb, Ur Not Established mg/dL 19.70 (H)       Lab Results   Component Value Date    HDL 43 06/10/2024    HDL 46 06/14/2023    HDL 65 (H) 06/22/2022    CHOL 171 06/10/2024    CHOL 152 06/14/2023    CHOL 185 06/22/2022    TRIG 123 06/10/2024    TRIG 106 06/14/2023    TRIG 102 06/22/2022     No results found for: \"TESTOSTERONE\", \"SHBG\", \"TESTFREENM\"  No results found for: \"TSH\", \"FT3\", \"T4FREE\"  No results found for: \"TPOABS\"    Review of Systems   Constitutional:  Negative for chills, fatigue and fever.   HENT:  Negative for congestion, ear pain, postnasal drip, rhinorrhea, sinus pressure and sore throat.    Eyes:  Negative for visual disturbance.   Respiratory:  Negative for cough, shortness of breath and wheezing.    Cardiovascular:  Positive for leg swelling. Negative for chest pain and palpitations.   Gastrointestinal:  Negative for abdominal pain, diarrhea, nausea and vomiting.   Endocrine: Negative for cold intolerance, heat intolerance, polydipsia and polyuria.        Patient denies history of pancreatitis, alcohol abuse, family or personal history of thyroid cancer, MEN 2, MTC.    Genitourinary:  Negative for difficulty urinating.   Musculoskeletal:  Positive for gait problem and joint swelling. Negative for arthralgias.   Skin:  Negative for rash.   Allergic/Immunologic: Negative for environmental allergies.   Neurological:  Negative for dizziness and headaches.   Hematological:  Does not bruise/bleed easily.

## 2024-10-23 RX ORDER — DULAGLUTIDE 0.75 MG/.5ML
0.75 INJECTION, SOLUTION SUBCUTANEOUS WEEKLY
Qty: 2 ML | Refills: 3 | Status: SHIPPED | OUTPATIENT
Start: 2024-10-23 | End: 2024-10-23 | Stop reason: SDUPTHER

## 2024-10-23 RX ORDER — DULAGLUTIDE 0.75 MG/.5ML
0.75 INJECTION, SOLUTION SUBCUTANEOUS WEEKLY
Qty: 6 ML | Refills: 3 | Status: SHIPPED | OUTPATIENT
Start: 2024-10-23

## 2024-10-23 NOTE — PROGRESS NOTES
Patient calls today stating that his mail away pharmacy [facility] is a more affordable option for Trulicity.  I sent a prescription to that pharmacy today.

## 2024-10-25 ENCOUNTER — TELEPHONE (OUTPATIENT)
Dept: PULMONOLOGY | Age: 69
End: 2024-10-25

## 2024-10-25 NOTE — TELEPHONE ENCOUNTER
PT CALLED IN TO THE OFFICE ASKING FOR A LETTER TO BE FAXED TO [facility] STATING THAT THE PTS O2 IS FOR NIGHTLY USE AND NOT UNDER HIS WORKMEN'S COMP. BECAUSE THEY DO NOT WANT TO COVER O2         PLEASE ADVISE

## 2024-11-02 ENCOUNTER — HOSPITAL ENCOUNTER (OUTPATIENT)
Dept: RADIOLOGY | Facility: CLINIC | Age: 69
Discharge: HOME | End: 2024-11-02
Payer: COMMERCIAL

## 2024-11-02 DIAGNOSIS — Z87.81 S/P ORIF (OPEN REDUCTION INTERNAL FIXATION) FRACTURE: ICD-10-CM

## 2024-11-02 DIAGNOSIS — Z98.890 S/P ORIF (OPEN REDUCTION INTERNAL FIXATION) FRACTURE: ICD-10-CM

## 2024-11-02 PROCEDURE — 73700 CT LOWER EXTREMITY W/O DYE: CPT | Mod: RIGHT SIDE | Performed by: RADIOLOGY

## 2024-11-02 PROCEDURE — 73700 CT LOWER EXTREMITY W/O DYE: CPT | Mod: RT

## 2024-11-18 ENCOUNTER — OFFICE VISIT (OUTPATIENT)
Dept: PULMONOLOGY | Age: 69
End: 2024-11-18
Payer: MEDICARE

## 2024-11-18 VITALS
WEIGHT: 298 LBS | DIASTOLIC BLOOD PRESSURE: 92 MMHG | HEART RATE: 72 BPM | OXYGEN SATURATION: 98 % | SYSTOLIC BLOOD PRESSURE: 146 MMHG | BODY MASS INDEX: 39.32 KG/M2

## 2024-11-18 DIAGNOSIS — E66.01 MORBID OBESITY: ICD-10-CM

## 2024-11-18 DIAGNOSIS — R09.02 HYPOXIA: Primary | ICD-10-CM

## 2024-11-18 PROCEDURE — 1159F MED LIST DOCD IN RCRD: CPT | Performed by: INTERNAL MEDICINE

## 2024-11-18 PROCEDURE — 99214 OFFICE O/P EST MOD 30 MIN: CPT | Performed by: INTERNAL MEDICINE

## 2024-11-18 PROCEDURE — 3017F COLORECTAL CA SCREEN DOC REV: CPT | Performed by: INTERNAL MEDICINE

## 2024-11-18 PROCEDURE — 3080F DIAST BP >= 90 MM HG: CPT | Performed by: INTERNAL MEDICINE

## 2024-11-18 PROCEDURE — G8427 DOCREV CUR MEDS BY ELIG CLIN: HCPCS | Performed by: INTERNAL MEDICINE

## 2024-11-18 PROCEDURE — 3077F SYST BP >= 140 MM HG: CPT | Performed by: INTERNAL MEDICINE

## 2024-11-18 PROCEDURE — 1123F ACP DISCUSS/DSCN MKR DOCD: CPT | Performed by: INTERNAL MEDICINE

## 2024-11-18 PROCEDURE — 1036F TOBACCO NON-USER: CPT | Performed by: INTERNAL MEDICINE

## 2024-11-18 PROCEDURE — G8484 FLU IMMUNIZE NO ADMIN: HCPCS | Performed by: INTERNAL MEDICINE

## 2024-11-18 PROCEDURE — G8417 CALC BMI ABV UP PARAM F/U: HCPCS | Performed by: INTERNAL MEDICINE

## 2024-11-18 ASSESSMENT — ENCOUNTER SYMPTOMS
VOMITING: 0
NAUSEA: 0
ABDOMINAL PAIN: 0
CHEST TIGHTNESS: 0
RHINORRHEA: 0
COUGH: 0
SHORTNESS OF BREATH: 0
WHEEZING: 0
VOICE CHANGE: 0
EYE ITCHING: 0
SORE THROAT: 0
DIARRHEA: 0

## 2024-11-18 NOTE — PROGRESS NOTES
oriented to person, place, and time.      Cranial Nerves: No cranial nerve deficit.   Psychiatric:         Behavior: Behavior normal.         Current Outpatient Medications   Medication Sig Dispense Refill    dulaglutide (TRULICITY) 0.75 MG/0.5ML SOAJ Inject 0.75 mg into the skin once a week 6 mL 3    metFORMIN (GLUCOPHAGE) 500 MG tablet Take 1 tablet by mouth 2 times daily (with meals) 180 tablet 3    blood glucose test strips (ONETOUCH VERIO) strip 1 each by In Vitro route 2 times daily As needed. 100 each 3    atorvastatin (LIPITOR) 20 MG tablet TAKE 1 TABLET BY MOUTH ONCE  DAILY 90 tablet 3    metoprolol (LOPRESSOR) 100 MG tablet TAKE 1 TABLET BY MOUTH TWICE  DAILY 180 tablet 3    amLODIPine (NORVASC) 10 MG tablet TAKE 1 TABLET BY MOUTH DAILY 90 tablet 3    aspirin 81 MG chewable tablet Take 1 tablet by mouth in the morning and at bedtime for 71 doses 30 tablet 3    OneTouch Delica Lancets 33G MISC bid 100 each 5    Blood Glucose Monitoring Suppl (ONETOUCH VERIO) w/Device KIT As directed 1 kit 0    OneTouch Delica Lancets 33G MISC bid 100 each 5    fexofenadine (ALLEGRA) 180 MG tablet Take 1 tablet by mouth daily 90 tablet 3     No current facility-administered medications for this visit.       No results found for this or any previous visit.  ]  Results for orders placed during the hospital encounter of 06/21/24    XR CHEST PORTABLE    Narrative  EXAMINATION:  ONE XRAY VIEW OF THE CHEST    6/21/2024 10:56 am    COMPARISON:  None.    HISTORY:  ORDERING SYSTEM PROVIDED HISTORY: SOB  TECHNOLOGIST PROVIDED HISTORY:  Reason for exam:->SOB  What reading provider will be dictating this exam?->CRC    FINDINGS:  The lungs are without acute focal process.  There is no effusion or  pneumothorax. The cardiomediastinal silhouette is without acute process. The  osseous structures are without acute process.    Impression  No acute process.      Assessment/Plan:     1. Hypoxia  Overnight pulse oxymetry done show low O2 sat ,

## 2024-11-19 ENCOUNTER — APPOINTMENT (OUTPATIENT)
Dept: ORTHOPEDIC SURGERY | Facility: CLINIC | Age: 69
End: 2024-11-19
Payer: COMMERCIAL

## 2024-11-19 DIAGNOSIS — S72.301G: Primary | ICD-10-CM

## 2024-11-19 RX ORDER — HYDROCODONE BITARTRATE AND ACETAMINOPHEN 5; 325 MG/1; MG/1
1 TABLET ORAL EVERY 6 HOURS PRN
Qty: 28 TABLET | Refills: 0 | Status: SHIPPED | OUTPATIENT
Start: 2024-11-19 | End: 2024-11-26

## 2024-11-19 NOTE — PROGRESS NOTES
History of Present Illness  No chief complaint on file.      Patient returns today for evaluation retrograde nailing for a right distal third femoral shaft/supracondylar femur fracture..  The patient notes continued pain.  The patient denies any significant numbness or tingling of calf pain.  I sent him for CT scan to assess healing, the distal hardware construct as well as his knee.     Exam  side: right Lower Extremity :  Incisions are healed.  There is a large effusion, no erythema or warmth  There is crepitus with knee motion.     Radiographs  CT knee right wo IV contrast  Narrative: Interpreted By:  David Randle,   STUDY:  CT KNEE RIGHT WO IV CONTRAST; ;  11/2/2024 8:46 am      INDICATION:  Signs/Symptoms:lateral right knee pain, possible screw backing out.      ,Z98.890 Other specified postprocedural states,Z87.81 Personal  history of (healed) traumatic fracture      COMPARISON:  Plain film radiographs right femur performed on October 172024      Previous plain film radiographs July 9, 2024      ACCESSION NUMBER(S):  [id number]      ORDERING CLINICIAN:  RONALD CHRISTINE      TECHNIQUE:  Multiple thin-section axial images right femur reconstructed in the  sagittal and coronal plane without contrast.      FINDINGS:  Again note is made of postsurgical changes of ORIF of the right knee.  The distal femoral fracture shows significant callus formation but no  evidence of solid bony bridging.      The distal portion of the femoral fixation hardware is evaluated. The  distal most femoral locking screw is backed out by an approximate 1.5  cm.      The hardware has not otherwise normal appearance.      Subchondral insufficiency fracture of the medial femoral condyle  looks to have perhaps progressed from the July 24 study with possible  loose osteochondral small fragment in the defect.      Moderate arthrosis of the knee again noted.      Small nondisplaced fibular neck fracture with some callus noted.      There is a  sizable joint effusion.      No discrete focal fluid collections are identified.      No muscular attenuation changes.          Impression: Postsurgical changes of ORIF of the right distal femur as above. The  distal most femoral locking screw of the knee is backed out by an  approximate 1.5 cm.      The femoral fracture shows a large amount of callus but no definite  bony bridging.      Suggested progression of the subchondral femoral insufficiency  fracture of the medial femoral condyle.      Subacute right fibular neck fracture, nondisplaced.      Large effusion.          MACRO:  None      Signed by: David Randle 11/4/2024 11:29 AM  Dictation workstation:   [id number]       Assessment  Patient status post retrograde nailing right distal third/supracondylar femur fracture  Delayed union right distal femur fracture  Osteochondral defect right knee     Plan  Toe-touch weightbearing on the right  Bone stimulator C9 to be filled out  Hardware removal (distal locking screw) C9 to be filled out.  I explained to him that the goal is to get the femur fracture to unite.  He likely then would require a knee arthroplasty    Off work until at least March   [place] for pain questions answered

## 2024-11-26 DIAGNOSIS — S72.301G: ICD-10-CM

## 2024-11-29 ENCOUNTER — LAB (OUTPATIENT)
Dept: LAB | Facility: LAB | Age: 69
End: 2024-11-29
Payer: COMMERCIAL

## 2024-11-29 DIAGNOSIS — T84.84XA PAIN DUE TO INTERNAL ORTHOPEDIC PROSTHETIC DEVICES, IMPLANTS AND GRAFTS, INITIAL ENCOUNTER (CMS-HCC): ICD-10-CM

## 2024-11-29 DIAGNOSIS — M93.261 OSTEOCHONDRITIS DISSECANS, RIGHT KNEE: ICD-10-CM

## 2024-11-29 LAB
ALBUMIN SERPL BCP-MCNC: 4.7 G/DL (ref 3.4–5)
ALP SERPL-CCNC: 107 U/L (ref 33–136)
ALT SERPL W P-5'-P-CCNC: 32 U/L (ref 10–52)
ANION GAP SERPL CALC-SCNC: 15 MMOL/L (ref 10–20)
AST SERPL W P-5'-P-CCNC: 23 U/L (ref 9–39)
BASOPHILS # BLD AUTO: 0.07 X10*3/UL (ref 0–0.1)
BASOPHILS NFR BLD AUTO: 1.2 %
BILIRUB SERPL-MCNC: 1.6 MG/DL (ref 0–1.2)
BUN SERPL-MCNC: 18 MG/DL (ref 6–23)
CALCIUM SERPL-MCNC: 9.8 MG/DL (ref 8.6–10.3)
CHLORIDE SERPL-SCNC: 103 MMOL/L (ref 98–107)
CO2 SERPL-SCNC: 28 MMOL/L (ref 21–32)
CREAT SERPL-MCNC: 1.06 MG/DL (ref 0.5–1.3)
EGFRCR SERPLBLD CKD-EPI 2021: 76 ML/MIN/1.73M*2
EOSINOPHIL # BLD AUTO: 0.24 X10*3/UL (ref 0–0.7)
EOSINOPHIL NFR BLD AUTO: 4 %
ERYTHROCYTE [DISTWIDTH] IN BLOOD BY AUTOMATED COUNT: 14 % (ref 11.5–14.5)
EST. AVERAGE GLUCOSE BLD GHB EST-MCNC: 137 MG/DL
GLUCOSE SERPL-MCNC: 114 MG/DL (ref 74–99)
HBA1C MFR BLD: 6.4 %
HCT VFR BLD AUTO: 45.1 % (ref 41–52)
HGB BLD-MCNC: 14.8 G/DL (ref 13.5–17.5)
IMM GRANULOCYTES # BLD AUTO: 0.03 X10*3/UL (ref 0–0.7)
IMM GRANULOCYTES NFR BLD AUTO: 0.5 % (ref 0–0.9)
INR PPP: 1 (ref 0.9–1.1)
LYMPHOCYTES # BLD AUTO: 2 X10*3/UL (ref 1.2–4.8)
LYMPHOCYTES NFR BLD AUTO: 33.4 %
MCH RBC QN AUTO: 27.8 PG (ref 26–34)
MCHC RBC AUTO-ENTMCNC: 32.8 G/DL (ref 32–36)
MCV RBC AUTO: 85 FL (ref 80–100)
MONOCYTES # BLD AUTO: 0.58 X10*3/UL (ref 0.1–1)
MONOCYTES NFR BLD AUTO: 9.7 %
NEUTROPHILS # BLD AUTO: 3.06 X10*3/UL (ref 1.2–7.7)
NEUTROPHILS NFR BLD AUTO: 51.2 %
NRBC BLD-RTO: 0 /100 WBCS (ref 0–0)
PLATELET # BLD AUTO: 221 X10*3/UL (ref 150–450)
POTASSIUM SERPL-SCNC: 4.7 MMOL/L (ref 3.5–5.3)
PROT SERPL-MCNC: 7.3 G/DL (ref 6.4–8.2)
PROTHROMBIN TIME: 11.4 SECONDS (ref 9.8–12.8)
RBC # BLD AUTO: 5.32 X10*6/UL (ref 4.5–5.9)
SODIUM SERPL-SCNC: 141 MMOL/L (ref 136–145)
WBC # BLD AUTO: 6 X10*3/UL (ref 4.4–11.3)

## 2024-11-29 PROCEDURE — 36415 COLL VENOUS BLD VENIPUNCTURE: CPT

## 2024-11-29 PROCEDURE — 85610 PROTHROMBIN TIME: CPT

## 2024-11-29 PROCEDURE — 85025 COMPLETE CBC W/AUTO DIFF WBC: CPT

## 2024-11-29 PROCEDURE — 80053 COMPREHEN METABOLIC PANEL: CPT

## 2024-12-03 DIAGNOSIS — Z98.890 S/P HARDWARE REMOVAL: Primary | ICD-10-CM

## 2024-12-03 RX ORDER — HYDROCODONE BITARTRATE AND ACETAMINOPHEN 5; 325 MG/1; MG/1
1 TABLET ORAL EVERY 6 HOURS PRN
Qty: 28 TABLET | Refills: 0 | Status: SHIPPED | OUTPATIENT
Start: 2024-12-03 | End: 2024-12-10

## 2024-12-04 PROCEDURE — 20680 REMOVAL OF IMPLANT DEEP: CPT | Performed by: ORTHOPAEDIC SURGERY

## 2024-12-11 ENCOUNTER — OFFICE VISIT (OUTPATIENT)
Dept: FAMILY MEDICINE CLINIC | Age: 69
End: 2024-12-11

## 2024-12-11 VITALS
HEIGHT: 72 IN | RESPIRATION RATE: 18 BRPM | DIASTOLIC BLOOD PRESSURE: 80 MMHG | OXYGEN SATURATION: 97 % | HEART RATE: 59 BPM | BODY MASS INDEX: 40.42 KG/M2 | SYSTOLIC BLOOD PRESSURE: 144 MMHG

## 2024-12-11 DIAGNOSIS — S82.831D CLOSED FRACTURE OF PROXIMAL END OF RIGHT FIBULA WITH ROUTINE HEALING, UNSPECIFIED FRACTURE MORPHOLOGY, SUBSEQUENT ENCOUNTER: ICD-10-CM

## 2024-12-11 DIAGNOSIS — I10 ESSENTIAL HYPERTENSION: Primary | ICD-10-CM

## 2024-12-11 ASSESSMENT — ENCOUNTER SYMPTOMS
RESPIRATORY NEGATIVE: 1
SHORTNESS OF BREATH: 0
GASTROINTESTINAL NEGATIVE: 1
EYES NEGATIVE: 1
ALLERGIC/IMMUNOLOGIC NEGATIVE: 1

## 2024-12-17 ENCOUNTER — APPOINTMENT (OUTPATIENT)
Dept: ORTHOPEDIC SURGERY | Facility: CLINIC | Age: 69
End: 2024-12-17
Payer: COMMERCIAL

## 2024-12-17 DIAGNOSIS — Z87.81 S/P ORIF (OPEN REDUCTION INTERNAL FIXATION) FRACTURE: Primary | ICD-10-CM

## 2024-12-17 DIAGNOSIS — Z98.890 POST-OPERATIVE STATE: ICD-10-CM

## 2024-12-17 DIAGNOSIS — S72.301G: ICD-10-CM

## 2024-12-17 DIAGNOSIS — Z98.890 S/P ORIF (OPEN REDUCTION INTERNAL FIXATION) FRACTURE: Primary | ICD-10-CM

## 2024-12-17 NOTE — PROGRESS NOTES
History of Present Illness   Patient is here for his right knee.  He is status post hardware removal of a distal locking screw that had backed out.  He also has been wearing a bone stimulator for a nonunion of a right distal femoral shaft fracture.  He has some ambulating and points more to the pelvic rim  He also states his quad feels weak  He has some pain at the fracture site.     Review of Systems   GENERAL: Negative for malaise, significant weight loss, fever  MUSCULOSKELETAL: see HPI  NEURO:  Negative     No past medical history on file.     Medication Documentation Review Audit       Reviewed by Luz Christine on 24 at 0809      Medication Order Taking? Sig Documenting Provider Last Dose Status   amLODIPine (Norvasc) 10 mg tablet [id number]  Take 1 tablet (10 mg) by mouth once daily. Historical Provider, MD  Active   aspirin 81 mg chewable tablet [id number]  Chew 1 tablet (81 mg) twice a day. Historical Provider, MD  Active   atorvastatin (Lipitor) 20 mg tablet [id number]  Take 1 tablet (20 mg) by mouth once daily. Historical Provider, MD  Active   blood sugar diagnostic strip [id number]  1 each. Historical Provider, MD  Active   cyclobenzaprine (Flexeril) 10 mg tablet [id number]  Take 1 tablet (10 mg) by mouth 3 times a day as needed for muscle spasms for up to 10 days. Dmitri Michael PA-C   24 2359   dulaglutide (Trulicity) 1.5 mg/0.5 mL pen injector injection [id number]  Inject 1.5 mg under the skin 1 (one) time per week. Historical Provider, MD  Active   meloxicam (Mobic) 15 mg tablet [id number]   Historical Provider, MD  Active   metFORMIN (Glucophage) 500 mg tablet [id number]  Take 1 tablet (500 mg) by mouth 2 times a day before meals. Historical Provider, MD  Active   metoprolol tartrate (Lopressor) 100 mg tablet [id number]  Take 1 tablet (100 mg) by mouth 2 times a day. Historical Provider, MD  Active   oxyCODONE (Roxicodone) 10 mg immediate release tablet [id number]  Take 1 tablet  (10 mg) by mouth every 4 hours if needed for pain. Historical Provider, MD  Active                     Physical Exam  Right knee:     Imaging  CT knee right wo IV contrast  Narrative: Interpreted By:  David Randle,   STUDY:  CT KNEE RIGHT WO IV CONTRAST; ;  11/2/2024 8:46 am      INDICATION:  Signs/Symptoms:lateral right knee pain, possible screw backing out.      ,Z98.890 Other specified postprocedural states,Z87.81 Personal  history of (healed) traumatic fracture      COMPARISON:  Plain film radiographs right femur performed on October 172024      Previous plain film radiographs July 9, 2024      ACCESSION NUMBER(S):  [id number]      ORDERING CLINICIAN:  RONALD CHRISTINE      TECHNIQUE:  Multiple thin-section axial images right femur reconstructed in the  sagittal and coronal plane without contrast.      FINDINGS:  Again note is made of postsurgical changes of ORIF of the right knee.  The distal femoral fracture shows significant callus formation but no  evidence of solid bony bridging.      The distal portion of the femoral fixation hardware is evaluated. The  distal most femoral locking screw is backed out by an approximate 1.5  cm.      The hardware has not otherwise normal appearance.      Subchondral insufficiency fracture of the medial femoral condyle  looks to have perhaps progressed from the July 24 study with possible  loose osteochondral small fragment in the defect.      Moderate arthrosis of the knee again noted.      Small nondisplaced fibular neck fracture with some callus noted.      There is a sizable joint effusion.      No discrete focal fluid collections are identified.      No muscular attenuation changes.          Impression: Postsurgical changes of ORIF of the right distal femur as above. The  distal most femoral locking screw of the knee is backed out by an  approximate 1.5 cm.      The femoral fracture shows a large amount of callus but no definite  bony bridging.      Suggested progression  of the subchondral femoral insufficiency  fracture of the medial femoral condyle.      Subacute right fibular neck fracture, nondisplaced.      Large effusion.          MACRO:  None      Signed by: David Randle 11/4/2024 11:29 AM  Dictation workstation:   [id number]       Assessment   Status post hardware removal right distal femur   Nonunion right distal femoral shaft fracture.       Plan  Staples were removed  Continue using the bone stimulator  Continue working on quad strengthening   Follow-up in 1 month for femur x-rays to assess healing   Questions answered      Procedures

## 2025-01-21 ENCOUNTER — APPOINTMENT (OUTPATIENT)
Dept: ORTHOPEDIC SURGERY | Facility: CLINIC | Age: 70
End: 2025-01-21
Payer: MEDICARE

## 2025-01-21 ENCOUNTER — HOSPITAL ENCOUNTER (OUTPATIENT)
Dept: RADIOLOGY | Facility: HOSPITAL | Age: 70
Discharge: HOME | End: 2025-01-21
Payer: COMMERCIAL

## 2025-01-21 DIAGNOSIS — Z87.81 S/P ORIF (OPEN REDUCTION INTERNAL FIXATION) FRACTURE: Primary | ICD-10-CM

## 2025-01-21 DIAGNOSIS — Z98.890 POST-OPERATIVE STATE: ICD-10-CM

## 2025-01-21 DIAGNOSIS — Z87.81 S/P ORIF (OPEN REDUCTION INTERNAL FIXATION) FRACTURE: ICD-10-CM

## 2025-01-21 DIAGNOSIS — M70.61 TROCHANTERIC BURSITIS OF RIGHT HIP: ICD-10-CM

## 2025-01-21 DIAGNOSIS — M76.31 ILIOTIBIAL BAND SYNDROME AFFECTING LOWER LEG, RIGHT: ICD-10-CM

## 2025-01-21 DIAGNOSIS — Z98.890 S/P ORIF (OPEN REDUCTION INTERNAL FIXATION) FRACTURE: ICD-10-CM

## 2025-01-21 DIAGNOSIS — S72.301G: ICD-10-CM

## 2025-01-21 DIAGNOSIS — Z98.890 S/P ORIF (OPEN REDUCTION INTERNAL FIXATION) FRACTURE: Primary | ICD-10-CM

## 2025-01-21 PROCEDURE — 73552 X-RAY EXAM OF FEMUR 2/>: CPT | Mod: RT

## 2025-01-21 NOTE — PROGRESS NOTES
History of Present Illness  No chief complaint on file.      Patient returns today for evaluation Status Post retrograde femoral nailing supracondylar femur fracture.  The patient denies any significant numbness or tingling of calf pain.  He complains of pain up near the greater trochanter.  He has improved distal thigh pain and knee pain.  He has been transitioning to a cane.     Exam  side: right Lower Extremity :  There is some tenderness to palpation over the trochanteric bursa.  There is tightness of the IT band    there is no tenderness at the fracture site   knee motion is not irritable     Radiographs  See dictated report     Assessment  Patient status post retrograde femoral nailing right supracondylar femur fracture right trochanteric bursitis and IT band tendinitis  Osteochondral defect right knee     Plan  Continue bone stimulator  Continue weightbearing as tolerated on the right and advance to cane full-time C9 for IT band tendinitis and trochanteric bursitis  Submit for a corticosteroid injection into the trochanteric bursa, follow-up upon approval  All questions were answered

## 2025-01-23 ENCOUNTER — OFFICE VISIT (OUTPATIENT)
Dept: ORTHOPEDIC SURGERY | Facility: CLINIC | Age: 70
End: 2025-01-23
Payer: COMMERCIAL

## 2025-01-23 DIAGNOSIS — M70.61 TROCHANTERIC BURSITIS OF RIGHT HIP: Primary | ICD-10-CM

## 2025-01-23 RX ORDER — LIDOCAINE HYDROCHLORIDE 10 MG/ML
1 INJECTION, SOLUTION INFILTRATION; PERINEURAL
Status: COMPLETED | OUTPATIENT
Start: 2025-01-23 | End: 2025-01-23

## 2025-01-23 RX ORDER — BETAMETHASONE SODIUM PHOSPHATE AND BETAMETHASONE ACETATE 3; 3 MG/ML; MG/ML
1 INJECTION, SUSPENSION INTRA-ARTICULAR; INTRALESIONAL; INTRAMUSCULAR; SOFT TISSUE
Status: COMPLETED | OUTPATIENT
Start: 2025-01-23 | End: 2025-01-23

## 2025-01-23 RX ADMIN — BETAMETHASONE SODIUM PHOSPHATE AND BETAMETHASONE ACETATE 1 MG: 3; 3 INJECTION, SUSPENSION INTRA-ARTICULAR; INTRALESIONAL; INTRAMUSCULAR; SOFT TISSUE at 08:20

## 2025-01-23 RX ADMIN — LIDOCAINE HYDROCHLORIDE 1 ML: 10 INJECTION, SOLUTION INFILTRATION; PERINEURAL at 08:20

## 2025-01-23 NOTE — PROGRESS NOTES
History of Present Illness   The patient is here for the injection into his trochanteric bursa on the right.     Review of Systems   GENERAL: Negative for malaise, significant weight loss, fever  MUSCULOSKELETAL: see HPI  NEURO:  Negative     History reviewed. No pertinent past medical history.     Medication Documentation Review Audit       Reviewed by Binh Domingo (Technologist) on 25 at 0812      Medication Order Taking? Sig Documenting Provider Last Dose Status   amLODIPine (Norvasc) 10 mg tablet [id number]  Take 1 tablet (10 mg) by mouth once daily. Historical Provider, MD  Active   aspirin 81 mg chewable tablet [id number]  Chew 1 tablet (81 mg) twice a day. Historical Provider, MD  Active   atorvastatin (Lipitor) 20 mg tablet [id number]  Take 1 tablet (20 mg) by mouth once daily. Historical Provider, MD  Active   blood sugar diagnostic strip [id number]  1 each. Historical Provider, MD  Active   cyclobenzaprine (Flexeril) 10 mg tablet [id number]  Take 1 tablet (10 mg) by mouth 3 times a day as needed for muscle spasms for up to 10 days. Dmitri Michael PA-C   24 2359   dulaglutide (Trulicity) 1.5 mg/0.5 mL pen injector injection [id number]  Inject 1.5 mg under the skin 1 (one) time per week. Historical Provider, MD  Active   meloxicam (Mobic) 15 mg tablet [id number]   Historical Provider, MD  Active   metFORMIN (Glucophage) 500 mg tablet [id number]  Take 1 tablet (500 mg) by mouth 2 times a day before meals. Historical Provider, MD  Active   metoprolol tartrate (Lopressor) 100 mg tablet [id number]  Take 1 tablet (100 mg) by mouth 2 times a day. Historical Provider, MD  Active   oxyCODONE (Roxicodone) 10 mg immediate release tablet [id number]  Take 1 tablet (10 mg) by mouth every 4 hours if needed for pain. Historical Provider, MD  Active                     Physical Exam  This is a male in no acute distress  There is tenderness to palpation around the posterior superior trochanteric region      Imaging  CT knee right wo IV contrast  Narrative: Interpreted By:  David Randle,   STUDY:  CT KNEE RIGHT WO IV CONTRAST; ;  11/2/2024 8:46 am      INDICATION:  Signs/Symptoms:lateral right knee pain, possible screw backing out.      ,Z98.890 Other specified postprocedural states,Z87.81 Personal  history of (healed) traumatic fracture      COMPARISON:  Plain film radiographs right femur performed on October 172024      Previous plain film radiographs July 9, 2024      ACCESSION NUMBER(S):  [id number]      ORDERING CLINICIAN:  RONALD CHRISTINE      TECHNIQUE:  Multiple thin-section axial images right femur reconstructed in the  sagittal and coronal plane without contrast.      FINDINGS:  Again note is made of postsurgical changes of ORIF of the right knee.  The distal femoral fracture shows significant callus formation but no  evidence of solid bony bridging.      The distal portion of the femoral fixation hardware is evaluated. The  distal most femoral locking screw is backed out by an approximate 1.5  cm.      The hardware has not otherwise normal appearance.      Subchondral insufficiency fracture of the medial femoral condyle  looks to have perhaps progressed from the July 24 study with possible  loose osteochondral small fragment in the defect.      Moderate arthrosis of the knee again noted.      Small nondisplaced fibular neck fracture with some callus noted.      There is a sizable joint effusion.      No discrete focal fluid collections are identified.      No muscular attenuation changes.          Impression: Postsurgical changes of ORIF of the right distal femur as above. The  distal most femoral locking screw of the knee is backed out by an  approximate 1.5 cm.      The femoral fracture shows a large amount of callus but no definite  bony bridging.      Suggested progression of the subchondral femoral insufficiency  fracture of the medial femoral condyle.      Subacute right fibular neck fracture,  nondisplaced.      Large effusion.          MACRO:  None      Signed by: David Randle 11/4/2024 11:29 AM  Dictation workstation:   [id number]       Assessment   Right trochanteric bursitis  IT band syndrome     Plan  Corticosteroid injection trochanteric bursa  Follow-up in 6 weeks  All questions were answered    L Inj/Asp: R greater trochanteric bursa on 1/23/2025 8:20 AM  Indications: pain  Details: 22 G needle, lateral approach  Medications: 1 mg betamethasone acet,sod phos 6 mg/mL; 1 mL lidocaine 10 mg/mL (1 %)  Outcome: tolerated well, no immediate complications  Consent was given by the patient. Patient was prepped and draped in the usual sterile fashion.

## 2025-02-28 ENCOUNTER — TELEPHONE (OUTPATIENT)
Dept: ORTHOPEDIC SURGERY | Facility: CLINIC | Age: 70
End: 2025-02-28
Payer: COMMERCIAL

## 2025-03-10 NOTE — PROGRESS NOTES
History of Present Illness   Patient is here for his right hip, femur and knee.  I injected his trochanteric bursa and recommended therapy.  He is here for recheck.  He continues to have lateral pain over the trochanteric bursa.  He also has knee pain and swelling.  Status post hardware removal of the distal screw.  He has been wearing the bone stimulator.  He is still using a walker and occasionally a cane.     Review of Systems   GENERAL: Negative for malaise, significant weight loss, fever  MUSCULOSKELETAL: see HPI  NEURO:  Negative     No past medical history on file.     Medication Documentation Review Audit       Reviewed by Binh Domingo (Technologist) on 25 at 0812      Medication Order Taking? Sig Documenting Provider Last Dose Status   amLODIPine (Norvasc) 10 mg tablet [id number]  Take 1 tablet (10 mg) by mouth once daily. Historical Provider, MD  Active   aspirin 81 mg chewable tablet [id number]  Chew 1 tablet (81 mg) twice a day. Historical Provider, MD  Active   atorvastatin (Lipitor) 20 mg tablet [id number]  Take 1 tablet (20 mg) by mouth once daily. Historical Provider, MD  Active   blood sugar diagnostic strip [id number]  1 each. Historical Provider, MD  Active   cyclobenzaprine (Flexeril) 10 mg tablet [id number]  Take 1 tablet (10 mg) by mouth 3 times a day as needed for muscle spasms for up to 10 days. Dmitri Michael PA-C   24 2359   dulaglutide (Trulicity) 1.5 mg/0.5 mL pen injector injection [id number]  Inject 1.5 mg under the skin 1 (one) time per week. Historical Provider, MD  Active   meloxicam (Mobic) 15 mg tablet [id number]   Historical Provider, MD  Active   metFORMIN (Glucophage) 500 mg tablet [id number]  Take 1 tablet (500 mg) by mouth 2 times a day before meals. Historical Provider, MD  Active   metoprolol tartrate (Lopressor) 100 mg tablet [id number]  Take 1 tablet (100 mg) by mouth 2 times a day. Historical Provider, MD  Active   oxyCODONE (Roxicodone) 10 mg immediate  release tablet [id number]  Take 1 tablet (10 mg) by mouth every 4 hours if needed for pain. Historical Provider, MD  Active                     Physical Exam  Right hip: With hip flexion, internal and external rotation he has no pain  He has tenderness over the trochanteric bursa.  Right knee:  The skin is intact, the extensor mechanism is intact.  There is an effusion, there is no erythema or warmth.  He has no tenderness to palpation at the fracture site about the distal femur.  He has medial and lateral joint line pain.     Imaging  XR femur right 2+ views  Narrative: Interpreted By:  Lucero Trotter,   STUDY:  XR FEMUR RIGHT 2+ VIEWS;  1/21/2025 8:26 am      INDICATION:  Signs/Symptoms:pain.      COMPARISON:  01/17/2024      ACCESSION NUMBER(S):  [id number]      ORDERING CLINICIAN:  RAYMOND RODRÍGUEZ      FINDINGS:  Again seen is intramedullary jarrell with distal interlocking screws  transfixing a distal femoral fracture.. Previously seen proximal  screw has been removed as well as whenever the previously seen distal  interlocking screws.  large amount of callus formation is seen. The  hardware is intact. No perihardware lucency is identified.  Degenerative changes are seen in the knee and hip. No new fracture or  dislocation is noted.      Impression: Healing distal femoral fracture status post ORIF with intact hardware.          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  I will fill out the C9  Once bony union is achieved consider hardware removal and then address the knee arthrosis  1 year handicap placard given  Questions answered    Procedures

## 2025-03-11 ENCOUNTER — HOSPITAL ENCOUNTER (OUTPATIENT)
Dept: RADIOLOGY | Facility: HOSPITAL | Age: 70
Discharge: HOME | End: 2025-03-11
Payer: COMMERCIAL

## 2025-03-11 ENCOUNTER — APPOINTMENT (OUTPATIENT)
Dept: ORTHOPEDIC SURGERY | Facility: CLINIC | Age: 70
End: 2025-03-11
Payer: COMMERCIAL

## 2025-03-11 DIAGNOSIS — M70.61 TROCHANTERIC BURSITIS OF RIGHT HIP: Primary | ICD-10-CM

## 2025-03-11 DIAGNOSIS — Z98.890 S/P ORIF (OPEN REDUCTION INTERNAL FIXATION) FRACTURE: Primary | ICD-10-CM

## 2025-03-11 DIAGNOSIS — Z98.890 S/P ORIF (OPEN REDUCTION INTERNAL FIXATION) FRACTURE: ICD-10-CM

## 2025-03-11 DIAGNOSIS — Z87.81 S/P ORIF (OPEN REDUCTION INTERNAL FIXATION) FRACTURE: ICD-10-CM

## 2025-03-11 DIAGNOSIS — S72.301G: ICD-10-CM

## 2025-03-11 DIAGNOSIS — Z87.81 S/P ORIF (OPEN REDUCTION INTERNAL FIXATION) FRACTURE: Primary | ICD-10-CM

## 2025-03-11 PROCEDURE — 73552 X-RAY EXAM OF FEMUR 2/>: CPT | Mod: RT

## 2025-04-10 ENCOUNTER — HOSPITAL ENCOUNTER (OUTPATIENT)
Dept: RADIOLOGY | Facility: CLINIC | Age: 70
Discharge: HOME | End: 2025-04-10
Payer: COMMERCIAL

## 2025-04-10 DIAGNOSIS — Z87.81 S/P ORIF (OPEN REDUCTION INTERNAL FIXATION) FRACTURE: ICD-10-CM

## 2025-04-10 DIAGNOSIS — Z98.890 S/P ORIF (OPEN REDUCTION INTERNAL FIXATION) FRACTURE: ICD-10-CM

## 2025-04-10 DIAGNOSIS — S72.301G: ICD-10-CM

## 2025-04-10 PROCEDURE — 73700 CT LOWER EXTREMITY W/O DYE: CPT | Mod: RT

## 2025-04-14 DIAGNOSIS — E11.65 TYPE 2 DIABETES MELLITUS WITH HYPERGLYCEMIA, WITHOUT LONG-TERM CURRENT USE OF INSULIN (HCC): ICD-10-CM

## 2025-04-14 LAB
ALBUMIN SERPL-MCNC: 4.7 G/DL (ref 3.5–4.6)
ALP SERPL-CCNC: 94 U/L (ref 35–104)
ALT SERPL-CCNC: 44 U/L (ref 0–41)
ANION GAP SERPL CALCULATED.3IONS-SCNC: 16 MEQ/L (ref 9–15)
AST SERPL-CCNC: 27 U/L (ref 0–40)
BILIRUB SERPL-MCNC: 1.4 MG/DL (ref 0.2–0.7)
BUN SERPL-MCNC: 19 MG/DL (ref 8–23)
CALCIUM SERPL-MCNC: 9.7 MG/DL (ref 8.5–9.9)
CHLORIDE SERPL-SCNC: 101 MEQ/L (ref 95–107)
CO2 SERPL-SCNC: 23 MEQ/L (ref 20–31)
CREAT SERPL-MCNC: 1.08 MG/DL (ref 0.7–1.2)
ERYTHROCYTE [DISTWIDTH] IN BLOOD BY AUTOMATED COUNT: 12.9 % (ref 11.5–14.5)
ESTIMATED AVERAGE GLUCOSE: 143 MG/DL
GLOBULIN SER CALC-MCNC: 2.9 G/DL (ref 2.3–3.5)
GLUCOSE SERPL-MCNC: 132 MG/DL (ref 70–99)
HBA1C MFR BLD: 6.6 % (ref 4–6)
HCT VFR BLD AUTO: 46.7 % (ref 42–52)
HGB BLD-MCNC: 15.5 G/DL (ref 14–18)
MCH RBC QN AUTO: 28.4 PG (ref 27–31.3)
MCHC RBC AUTO-ENTMCNC: 33.2 % (ref 33–37)
MCV RBC AUTO: 85.7 FL (ref 79–92.2)
PLATELET # BLD AUTO: 248 K/UL (ref 130–400)
POTASSIUM SERPL-SCNC: 4.5 MEQ/L (ref 3.4–4.9)
PROT SERPL-MCNC: 7.6 G/DL (ref 6.3–8)
RBC # BLD AUTO: 5.45 M/UL (ref 4.7–6.1)
SODIUM SERPL-SCNC: 140 MEQ/L (ref 135–144)
WBC # BLD AUTO: 5.5 K/UL (ref 4.8–10.8)

## 2025-04-21 NOTE — PROGRESS NOTES
History of Present Illness  No chief complaint on file.      Patient returns today for evaluation retrograde femoral nailing for a right distal femoral shaft fracture.  Approximately 10 months postop.  He has a delayed union.    He has been wearing the bone stimulator.  He complains of knee pain and some hip pain.  I had sent him for CT scan to assess healing.  He is here today for follow-up.       Exam  side: right Lower Extremity :  The incisions are healed  Large knee effusion along with fluid adjacent the lateral femoral condyle.  Extensor mechanism is intact  He has some tenderness to palpation about the knee.     Radiographs  CT femur right wo IV contrast  Narrative: Interpreted By:  Carmen Melgar,   STUDY:  CT FEMUR RIGHT WO IV CONTRAST      INDICATION:  Signs/Symptoms:pain      COMPARISON:  Right femur radiographs dated 03/11/2025 and 01/21/2025.      ACCESSION NUMBER(S):  [id number]      ORDERING CLINICIAN:  RAYMOND RODRÍGUEZ      TECHNIQUE:  Contiguous axial CT images of the right femur were performed without  intravenous contrast administration. Coronal and sagittal reformatted  images were performed. 3D reformatted images were created and  reviewed.      FINDINGS:  OSSEOUS STRUCTURES:  Postsurgical changes of open reduction and internal fixation of  distal right femoral diaphyseal fracture with intramedullary nail and  screw fixation are noted. Fractured proximal and distal fixation  screws are again noted (best seen on sagittal image 61/131 and  coronal image 63/136). However no significant lucency at the bone  metal interface to suggest hardware loosening. There is suggestion of  very minimal subsidence of the intramedullary nail compared to  immediate prior postsurgical radiographs dated 07/09/2024 (within  limitation of differences in radiographic and CT techniques. However  there is no definite evidence of intra-articular protrusion of the  intramedullary nail into the knee joint. The distal oblique  screw  extending from the anterior aspect of the lateral femoral condyle  into the posterior aspect of the medial femoral condyle is proud with  mild protrusion of the screw head along the anterior aspect of  lateral femoral condyle into the suprapatellar recess by up to 8 mm.  This is new compared to prior CT knee dated 11/02/2024. There is  moderate volume suprapatellar joint effusion with fluid predominantly  centered along the lateral aspect of the suprapatellar recess with  mass effect on the overlying lateral patellofemoral retinaculum.      There is persistent evidence of oblique fracture lucency through  distal femoral diaphysis without evidence of osseous bridging at this  point in time. There is suggestion of sclerosis of the fracture  margins with cortical hypertrophic changes at the appositional  surfaces of the fracture ends, concerning for delayed/nonhealing. No  new acute fracture deformity is noted. There are severe  tricompartmental degenerative changes in the right knee joint, more  pronounced in the medial femorotibial compartment. There is  osteochondral defect in the central weight-bearing region of the  medial femoral condyle measuring up to 2.5 x 1.5 cm in AP and  transverse dimensions respectively with suggestion of an unstable  osteochondral fragment at this level. These findings are similar  compared to prior CT knee dated 11/02/2024.      Right hip joint is intact and appears grossly unremarkable.      SOFT TISSUES:  Evaluation is limited by the lack of intravenous contrast. Within  this limitation, there is no obvious fluid collection or mass lesion  in the visualized field of view. Visualized musculature appears  grossly unremarkable. There is moderate to large volume suprapatellar  joint effusion in the knee joint with fluid predominantly  concentrated along the lateral aspect of the suprapatellar fossa  causing mass effect on overlying lateral patellofemoral retinaculum.  There is mild  reticular subcutaneous soft tissue edema in the  prepatellar region. Otherwise rest of the soft tissues appear grossly  unremarkable. Visualized intrapelvic soft tissues are unremarkable.      Impression: 1. Status post open reduction and internal fixation of the distal  femur fracture with findings concerning for hardware failure as  evidenced by fractured proximal and distal interlocking screws. There  is also interval backing of the distal most interlocking screw  extending from the lateral to medial femoral condyles with proud  screw head measuring up to 8 mm as described above. There is also  suggestion of mild subsidence of the intramedullary nail compared to  immediate prior postsurgical radiograph dating back to 07/09/2024  (within limitation of differences in x-ray and CT technique).  2. Redemonstration of fracture lucency in the distal femoral  diaphysis without osseous bridging at this point in time.  Constellation of findings are concerning for delayed/nonunion.  3. Redemonstration of osteochondral defect/subchondral insufficiency  fracture in the central weight-bearing region of the medial femoral  condyle measuring up to 2.5 x 1.5 cm, similar compared to prior CT  knee dated 11/02/2024.  4. Large volume suprapatellar knee joint effusion with fluid  concentrated predominantly along the anterolateral aspect of the  suprapatellar fossa causing mass effect on the lateral patellofemoral  retinaculum. This is similar compared to prior CT knee dated  11/02/2024. This is of indeterminate sterility. Recommend correlation  with patient's symptomatology at this location.  5. Severe tricompartmental osteoarthrosis of the right knee.          MACRO:  Critical Finding:  See findings. Notification was initiated on  4/11/2025 at 12:09 pm by Dr. Carmen Melgar.  (**-YCF-**)      Signed by: Carmen Melgar 4/11/2025 12:09 PM  Dictation workstation:   [id number]       Assessment  None union right distal femur  fracture  Osteoarthrosis right knee with osteochondral defect medial femoral condyle     Plan  CBC, Westergren sed rate, C-reactive protein  Claim expansion for nonunion of the femur fracture  Continue protected weightbearing  I will reach out to the trauma team at the main campus for advice and/or referral  Questions answered

## 2025-04-22 ENCOUNTER — APPOINTMENT (OUTPATIENT)
Dept: ORTHOPEDIC SURGERY | Facility: CLINIC | Age: 70
End: 2025-04-22
Payer: COMMERCIAL

## 2025-04-22 ENCOUNTER — OFFICE VISIT (OUTPATIENT)
Age: 70
End: 2025-04-22
Payer: MEDICARE

## 2025-04-22 VITALS
SYSTOLIC BLOOD PRESSURE: 146 MMHG | WEIGHT: 315 LBS | HEIGHT: 72 IN | HEART RATE: 59 BPM | BODY MASS INDEX: 42.66 KG/M2 | DIASTOLIC BLOOD PRESSURE: 73 MMHG

## 2025-04-22 DIAGNOSIS — I10 ESSENTIAL HYPERTENSION: ICD-10-CM

## 2025-04-22 DIAGNOSIS — E11.65 TYPE 2 DIABETES MELLITUS WITH HYPERGLYCEMIA, WITHOUT LONG-TERM CURRENT USE OF INSULIN (HCC): Primary | ICD-10-CM

## 2025-04-22 DIAGNOSIS — E78.5 HYPERLIPIDEMIA, UNSPECIFIED HYPERLIPIDEMIA TYPE: ICD-10-CM

## 2025-04-22 DIAGNOSIS — S72.301G: ICD-10-CM

## 2025-04-22 DIAGNOSIS — E11.9 NEW ONSET TYPE 2 DIABETES MELLITUS (HCC): ICD-10-CM

## 2025-04-22 DIAGNOSIS — S72.401K: Primary | ICD-10-CM

## 2025-04-22 DIAGNOSIS — E66.813 OBESITY, CLASS 3 (HCC): ICD-10-CM

## 2025-04-22 LAB
CHP ED QC CHECK: NORMAL
GLUCOSE BLD-MCNC: 107 MG/DL

## 2025-04-22 PROCEDURE — 3078F DIAST BP <80 MM HG: CPT | Performed by: PHYSICIAN ASSISTANT

## 2025-04-22 PROCEDURE — 3017F COLORECTAL CA SCREEN DOC REV: CPT | Performed by: PHYSICIAN ASSISTANT

## 2025-04-22 PROCEDURE — 99214 OFFICE O/P EST MOD 30 MIN: CPT | Performed by: ORTHOPAEDIC SURGERY

## 2025-04-22 PROCEDURE — 1123F ACP DISCUSS/DSCN MKR DOCD: CPT | Performed by: PHYSICIAN ASSISTANT

## 2025-04-22 PROCEDURE — PBSHW POCT GLUCOSE: Performed by: PHYSICIAN ASSISTANT

## 2025-04-22 PROCEDURE — 99214 OFFICE O/P EST MOD 30 MIN: CPT | Performed by: PHYSICIAN ASSISTANT

## 2025-04-22 PROCEDURE — 3077F SYST BP >= 140 MM HG: CPT | Performed by: PHYSICIAN ASSISTANT

## 2025-04-22 PROCEDURE — 3044F HG A1C LEVEL LT 7.0%: CPT | Performed by: PHYSICIAN ASSISTANT

## 2025-04-22 PROCEDURE — 1125F AMNT PAIN NOTED PAIN PRSNT: CPT | Performed by: PHYSICIAN ASSISTANT

## 2025-04-22 PROCEDURE — G8417 CALC BMI ABV UP PARAM F/U: HCPCS | Performed by: PHYSICIAN ASSISTANT

## 2025-04-22 PROCEDURE — 1159F MED LIST DOCD IN RCRD: CPT | Performed by: PHYSICIAN ASSISTANT

## 2025-04-22 PROCEDURE — 82962 GLUCOSE BLOOD TEST: CPT | Performed by: PHYSICIAN ASSISTANT

## 2025-04-22 PROCEDURE — 99213 OFFICE O/P EST LOW 20 MIN: CPT | Performed by: PHYSICIAN ASSISTANT

## 2025-04-22 PROCEDURE — 1036F TOBACCO NON-USER: CPT | Performed by: PHYSICIAN ASSISTANT

## 2025-04-22 PROCEDURE — 2022F DILAT RTA XM EVC RTNOPTHY: CPT | Performed by: PHYSICIAN ASSISTANT

## 2025-04-22 PROCEDURE — G8427 DOCREV CUR MEDS BY ELIG CLIN: HCPCS | Performed by: PHYSICIAN ASSISTANT

## 2025-04-22 RX ORDER — BLOOD SUGAR DIAGNOSTIC
1 STRIP MISCELLANEOUS 3 TIMES DAILY
Qty: 300 EACH | Refills: 3 | Status: SHIPPED | OUTPATIENT
Start: 2025-04-22

## 2025-04-22 RX ORDER — LANCETS 33 GAUGE
EACH MISCELLANEOUS
Qty: 300 EACH | Refills: 5 | Status: SHIPPED | OUTPATIENT
Start: 2025-04-22

## 2025-04-22 RX ORDER — ATORVASTATIN CALCIUM 20 MG/1
20 TABLET, FILM COATED ORAL DAILY
Qty: 90 TABLET | Refills: 1 | Status: SHIPPED | OUTPATIENT
Start: 2025-04-22

## 2025-04-22 RX ORDER — AMLODIPINE BESYLATE 10 MG/1
10 TABLET ORAL DAILY
Qty: 90 TABLET | Refills: 1 | Status: SHIPPED | OUTPATIENT
Start: 2025-04-22

## 2025-04-22 ASSESSMENT — ENCOUNTER SYMPTOMS
SORE THROAT: 0
VOMITING: 0
RHINORRHEA: 0
COUGH: 0
WHEEZING: 0
SINUS PRESSURE: 0
ABDOMINAL PAIN: 0
SHORTNESS OF BREATH: 0
DIARRHEA: 0
NAUSEA: 0

## 2025-04-22 NOTE — PROGRESS NOTES
4/22/2025    Assessment:       Diagnosis Orders   1. Type 2 diabetes mellitus with hyperglycemia, without long-term current use of insulin ([facility])  POCT Glucose    Comprehensive Metabolic Panel    Hemoglobin A1C    Albumin/Creatinine Ratio, Urine    Lipid Panel      2. Obesity, class 3 (E66.813)        3. Body mass index [BMI] 45.0-49.9, adult (Z68.42)        4. New onset type 2 diabetes mellitus (HCC)  blood glucose test strips (ONETOUCH VERIO) strip        PLAN:     Assessment & Plan  1. Type 2 diabetes mellitus: well-controlled  - A1c level is 6.6%.  - Creatinine, BUN, and electrolytes are within normal limits; bilirubin and ALT slightly elevated.  - Discussed the cost and insurance coverage of Trulicity, Mounjaro, and Ozempic; provided a sample of [employer] 3 continuous glucose monitor.  - Prescribed Trulicity 0.75 mg weekly, renewed prescriptions for metformin, strips, and lancets; discussed Jardiance and Farxiga as alternatives.    2. Right femur fracture 6/2024  - Pain and swelling persist; leg is approximately 80% healed.  - Physical exam reveals significant swelling.  - Advised to continue using the walker and discussed the impact of diabetes on healing.  - Recommended limited physical activity, including stationary bike and water-based exercises.    Follow-up: The patient will follow up in 3 months.     Orders Placed This Encounter   Procedures    Comprehensive Metabolic Panel     Standing Status:   Future     Expected Date:   4/22/2025     Expiration Date:   4/22/2026    Hemoglobin A1C     Standing Status:   Future     Expected Date:   4/22/2025     Expiration Date:   4/22/2026    Albumin/Creatinine Ratio, Urine     Standing Status:   Future     Expected Date:   7/22/2025     Expiration Date:   4/22/2026    Lipid Panel     Standing Status:   Future     Expected Date:   4/22/2025     Expiration Date:   4/22/2026     Is Patient Fasting?/# of Hours:   8    POCT Glucose     Orders Placed This Encounter

## 2025-04-23 LAB
BASOPHILS # BLD AUTO: 68 CELLS/UL (ref 0–200)
BASOPHILS NFR BLD AUTO: 1.2 %
CRP SERPL-MCNC: <3 MG/L
EOSINOPHIL # BLD AUTO: 257 CELLS/UL (ref 15–500)
EOSINOPHIL NFR BLD AUTO: 4.5 %
ERYTHROCYTE [DISTWIDTH] IN BLOOD BY AUTOMATED COUNT: 12.8 % (ref 11–15)
ERYTHROCYTE [SEDIMENTATION RATE] IN BLOOD BY WESTERGREN METHOD: 6 MM/H
HCT VFR BLD AUTO: 45.3 % (ref 38.5–50)
HGB BLD-MCNC: 15 G/DL (ref 13.2–17.1)
LYMPHOCYTES # BLD AUTO: 1784 CELLS/UL (ref 850–3900)
LYMPHOCYTES NFR BLD AUTO: 31.3 %
MCH RBC QN AUTO: 28.6 PG (ref 27–33)
MCHC RBC AUTO-ENTMCNC: 33.1 G/DL (ref 32–36)
MCV RBC AUTO: 86.3 FL (ref 80–100)
MONOCYTES # BLD AUTO: 570 CELLS/UL (ref 200–950)
MONOCYTES NFR BLD AUTO: 10 %
NEUTROPHILS # BLD AUTO: 3021 CELLS/UL (ref 1500–7800)
NEUTROPHILS NFR BLD AUTO: 53 %
PLATELET # BLD AUTO: 240 THOUSAND/UL (ref 140–400)
PMV BLD REES-ECKER: 11.5 FL (ref 7.5–12.5)
RBC # BLD AUTO: 5.25 MILLION/UL (ref 4.2–5.8)
WBC # BLD AUTO: 5.7 THOUSAND/UL (ref 3.8–10.8)

## 2025-05-01 ENCOUNTER — TELEPHONE (OUTPATIENT)
Dept: ORTHOPEDIC SURGERY | Facility: CLINIC | Age: 70
End: 2025-05-01
Payer: COMMERCIAL

## 2025-05-01 NOTE — PROGRESS NOTES
History of Present Illness   No chief complaint on file.      Patient presents today for follow up evaluation of side: right Lower extremity fracture.   He is status post retrograde femoral nailing for a distal femoral fracture.  He has been wearing a bone stimulator.  The hardware has broken.  The patient has a nonunion.  I had sent him for a repeat CT scan to assess the healing as well as laboratory studies to rule out an infectious process.  The patient is here for follow-up.  He states his pain has improved markedly over the past couple weeks.     Medical History[1]    Medication Documentation Review Audit       Reviewed by Luz Christine on 25 at 0807      Medication Order Taking? Sig Documenting Provider Last Dose Status   amLODIPine (Norvasc) 10 mg tablet [id number]  Take 1 tablet (10 mg) by mouth once daily. Historical Provider, MD  Active   aspirin 81 mg chewable tablet [id number]  Chew 1 tablet (81 mg) twice a day. Historical Provider, MD  Active   atorvastatin (Lipitor) 20 mg tablet [id number]  Take 1 tablet (20 mg) by mouth once daily. Historical Provider, MD  Active   blood sugar diagnostic strip [id number]  1 each. Historical Provider, MD  Active   cyclobenzaprine (Flexeril) 10 mg tablet [id number]  Take 1 tablet (10 mg) by mouth 3 times a day as needed for muscle spasms for up to 10 days. Dmitri Michael PA-C   [id number] [phone]   dulaglutide (Trulicity) 1.5 mg/0.5 mL pen injector injection [id number]  Inject 1.5 mg under the skin 1 (one) time per week. Historical Provider, MD  Active   meloxicam (Mobic) 15 mg tablet [id number]   Historical Provider, MD  Active   metFORMIN (Glucophage) 500 mg tablet [id number]  Take 1 tablet (500 mg) by mouth 2 times a day before meals. Historical Provider, MD  Active   metoprolol tartrate (Lopressor) 100 mg tablet [id number]  Take 1 tablet (100 mg) by mouth 2 times a day. Historical Provider, MD  Active   oxyCODONE (Roxicodone) 10 mg immediate release tablet  [id number]  Take 1 tablet (10 mg) by mouth every 4 hours if needed for pain. Historical Provider, MD  Active                    RX Allergies[2]    Social History     Socioeconomic History    Marital status:      Spouse name: Not on file    Number of children: Not on file    Years of education: Not on file    Highest education level: Not on file   Occupational History    Not on file   Tobacco Use    Smoking status: Former     Types: Cigarettes     Passive exposure: Past    Smokeless tobacco: Not on file   Substance and Sexual Activity    Alcohol use: Not on file    Drug use: Not on file    Sexual activity: Not on file   Other Topics Concern    Not on file   Social History Narrative    Not on file     Social Drivers of Health     Financial Resource Strain: Low Risk  (6/18/2024)    Received from [facility]    Overall Financial Resource Strain (CARDIA)     Difficulty of Paying Living Expenses: Not hard at all   Food Insecurity: No Food Insecurity (6/22/2024)    Received from [facility]    Hunger Vital Sign     Worried About Running Out of Food in the Last Year: Never true     Ran Out of Food in the Last Year: Never true   Transportation Needs: No Transportation Needs (6/22/2024)    Received from [facility]    PRAPARE - Transportation     Lack of Transportation (Medical): No     Lack of Transportation (Non-Medical): No   Physical Activity: Sufficiently Active (6/18/2024)    Received from [facility]    Exercise Vital Sign     Days of Exercise per Week: 7 days     Minutes of Exercise per Session: 60 min   Stress: Not on file   Social Connections: Not on file   Intimate Partner Violence: Not on file   Housing Stability: Low Risk  (6/22/2024)    Received from [facility]    Housing Stability Vital Sign     Unable to Pay for Housing in the Last Year: No     Number of Places Lived in the Last Year: 1      Unstable Housing in the Last Year: No       Surgical History[3]       Review of Systems   GENERAL: Negative  GI: Negative  MUSCULOSKELETAL: See HPI  SKIN: Negative  NEURO:  Negative     Physical Exam:  side: right lower extremity:  The incisions are healed  There is an effusion as well as some fluid along the lateral femoral condyle.  No erythema, warmth or drainage is appreciated  There is no tenderness at the fracture site     Imaging  CT femur right wo IV contrast  Narrative: Interpreted By:  Carmen Melgar,   STUDY:  CT FEMUR RIGHT WO IV CONTRAST      INDICATION:  Signs/Symptoms:pain      COMPARISON:  Right femur radiographs dated 03/11/2025 and 01/21/2025.      ACCESSION NUMBER(S):  [id number]      ORDERING CLINICIAN:  RAYMOND RODRÍGUEZ      TECHNIQUE:  Contiguous axial CT images of the right femur were performed without  intravenous contrast administration. Coronal and sagittal reformatted  images were performed. 3D reformatted images were created and  reviewed.      FINDINGS:  OSSEOUS STRUCTURES:  Postsurgical changes of open reduction and internal fixation of  distal right femoral diaphyseal fracture with intramedullary nail and  screw fixation are noted. Fractured proximal and distal fixation  screws are again noted (best seen on sagittal image 61/131 and  coronal image 63/136). However no significant lucency at the bone  metal interface to suggest hardware loosening. There is suggestion of  very minimal subsidence of the intramedullary nail compared to  immediate prior postsurgical radiographs dated 07/09/2024 (within  limitation of differences in radiographic and CT techniques. However  there is no definite evidence of intra-articular protrusion of the  intramedullary nail into the knee joint. The distal oblique screw  extending from the anterior aspect of the lateral femoral condyle  into the posterior aspect of the medial femoral condyle is proud with  mild protrusion of the screw head along the anterior  aspect of  lateral femoral condyle into the suprapatellar recess by up to 8 mm.  This is new compared to prior CT knee dated 11/02/2024. There is  moderate volume suprapatellar joint effusion with fluid predominantly  centered along the lateral aspect of the suprapatellar recess with  mass effect on the overlying lateral patellofemoral retinaculum.      There is persistent evidence of oblique fracture lucency through  distal femoral diaphysis without evidence of osseous bridging at this  point in time. There is suggestion of sclerosis of the fracture  margins with cortical hypertrophic changes at the appositional  surfaces of the fracture ends, concerning for delayed/nonhealing. No  new acute fracture deformity is noted. There are severe  tricompartmental degenerative changes in the right knee joint, more  pronounced in the medial femorotibial compartment. There is  osteochondral defect in the central weight-bearing region of the  medial femoral condyle measuring up to 2.5 x 1.5 cm in AP and  transverse dimensions respectively with suggestion of an unstable  osteochondral fragment at this level. These findings are similar  compared to prior CT knee dated 11/02/2024.      Right hip joint is intact and appears grossly unremarkable.      SOFT TISSUES:  Evaluation is limited by the lack of intravenous contrast. Within  this limitation, there is no obvious fluid collection or mass lesion  in the visualized field of view. Visualized musculature appears  grossly unremarkable. There is moderate to large volume suprapatellar  joint effusion in the knee joint with fluid predominantly  concentrated along the lateral aspect of the suprapatellar fossa  causing mass effect on overlying lateral patellofemoral retinaculum.  There is mild reticular subcutaneous soft tissue edema in the  prepatellar region. Otherwise rest of the soft tissues appear grossly  unremarkable. Visualized intrapelvic soft tissues are unremarkable.       Impression: 1. Status post open reduction and internal fixation of the distal  femur fracture with findings concerning for hardware failure as  evidenced by fractured proximal and distal interlocking screws. There  is also interval backing of the distal most interlocking screw  extending from the lateral to medial femoral condyles with proud  screw head measuring up to 8 mm as described above. There is also  suggestion of mild subsidence of the intramedullary nail compared to  immediate prior postsurgical radiograph dating back to 07/09/2024  (within limitation of differences in x-ray and CT technique).  2. Redemonstration of fracture lucency in the distal femoral  diaphysis without osseous bridging at this point in time.  Constellation of findings are concerning for delayed/nonunion.  3. Redemonstration of osteochondral defect/subchondral insufficiency  fracture in the central weight-bearing region of the medial femoral  condyle measuring up to 2.5 x 1.5 cm, similar compared to prior CT  knee dated 11/02/2024.  4. Large volume suprapatellar knee joint effusion with fluid  concentrated predominantly along the anterolateral aspect of the  suprapatellar fossa causing mass effect on the lateral patellofemoral  retinaculum. This is similar compared to prior CT knee dated  11/02/2024. This is of indeterminate sterility. Recommend correlation  with patient's symptomatology at this location.  5. Severe tricompartmental osteoarthrosis of the right knee.          MACRO:  Critical Finding:  See findings. Notification was initiated on  4/11/2025 at 12:09 pm by Dr. Carmen Melgar.  (**-YCF-**)      Signed by: Carmen Melgar 4/11/2025 12:09 PM  Dictation workstation:   [id number]  Laboratory studies:    CBC and Auto Differential  Order: [id number]   Status: Final result       Dx: Delayed union of closed fracture of s...    Test Result Released: Yes (seen)    0 Result Notes      Component  Ref Range & Units 10 d ago   WHITE  BLOOD CELL COUNT  3.8 - 10.8 Thousand/uL 5.7   RED BLOOD CELL COUNT  4.20 - 5.80 Million/uL 5.25   HEMOGLOBIN  13.2 - 17.1 g/dL 15.0   HEMATOCRIT  38.5 - 50.0 % 45.3   MCV  80.0 - 100.0 fL 86.3   MCH  27.0 - 33.0 pg 28.6   MCHC  32.0 - 36.0 g/dL 33.1   Comment: For adults, a slight decrease in the calculated MCHC  value (in the range of 30 to 32 g/dL) is most likely  not clinically significant; however, it should be  interpreted with caution in correlation with other  red cell parameters and the patient's clinical  condition.   RDW  11.0 - 15.0 % 12.8   PLATELET COUNT  140 - 400 Thousand/uL 240   MPV  7.5 - 12.5 fL 11.5   ABSOLUTE NEUTROPHILS  1,500 - 7,800 cells/uL 3,021   ABSOLUTE LYMPHOCYTES  850 - 3,900 cells/uL 1,784   ABSOLUTE MONOCYTES  200 - 950 cells/uL 570   ABSOLUTE EOSINOPHILS  15 - 500 cells/uL 257   ABSOLUTE BASOPHILS  0 - 200 cells/uL 68   NEUTROPHILS  % 53   LYMPHOCYTES  % 31.3   MONOCYTES  % 10.0   EOSINOPHILS  % 4.5   BASOPHILS  % 1.2   Resulting Agency [employer] [facility]      Sedimentation Rate  Order: [id number]   Status: Final result       Dx: Delayed union of closed fracture of s...    Test Result Released: Yes (seen)    0 Result Notes      Component  Ref Range & Units 10 d ago   SED RATE BY MODIFIED WESTERGREN  < OR = 20 mm/h 6      C-Reactive Protein  Order: [id number]   Status: Final result       Dx: Delayed union of closed fracture of s...    Test Result Released: Yes (seen)    0 Result Notes      Component  Ref Range & Units 10 d ago   C-REACTIVE PROTEIN  <8.0 mg/L <3.0           Assessment   Status post retrograde right femoral nailing  Nonunion right distal femur fracture     Plan:  CT scan reviewed  I had a lengthy discussion with the patient about the CT findings and that the fracture has not united.  I discussed the broken hardware as well.  The patient feels much better and would like to give this a little more time.  My recommendation was for an exchange  nailing.  I will see him then in 6 weeks for recheck and x-rays to assess healing, sooner if there is any problems  Continue wearing the bone stimulator  All questions answered                      [1] No past medical history on file.  [2] No Known Allergies  [3] No past surgical history on file.

## 2025-05-02 ENCOUNTER — OFFICE VISIT (OUTPATIENT)
Dept: ORTHOPEDIC SURGERY | Facility: CLINIC | Age: 70
End: 2025-05-02
Payer: COMMERCIAL

## 2025-05-02 DIAGNOSIS — Z87.81 S/P ORIF (OPEN REDUCTION INTERNAL FIXATION) FRACTURE: Primary | ICD-10-CM

## 2025-05-02 DIAGNOSIS — Z98.890 S/P ORIF (OPEN REDUCTION INTERNAL FIXATION) FRACTURE: Primary | ICD-10-CM

## 2025-05-02 DIAGNOSIS — S72.331K CLOSED DISPLACED OBLIQUE FRACTURE OF SHAFT OF RIGHT FEMUR WITH NONUNION: ICD-10-CM

## 2025-05-02 PROCEDURE — 99212 OFFICE O/P EST SF 10 MIN: CPT | Performed by: ORTHOPAEDIC SURGERY

## 2025-06-10 DIAGNOSIS — Z98.890 S/P ORIF (OPEN REDUCTION INTERNAL FIXATION) FRACTURE: Primary | ICD-10-CM

## 2025-06-10 DIAGNOSIS — Z87.81 S/P ORIF (OPEN REDUCTION INTERNAL FIXATION) FRACTURE: Primary | ICD-10-CM

## 2025-06-16 ENCOUNTER — APPOINTMENT (OUTPATIENT)
Dept: ORTHOPEDIC SURGERY | Facility: CLINIC | Age: 70
End: 2025-06-16
Payer: COMMERCIAL

## 2025-06-16 NOTE — PROGRESS NOTES
History of Present Illness  No chief complaint on file.      Patient returns today for evaluation Status Post retrograde nailing of right distal femur fracture.  The fracture is a nonunion.  He has been wearing a bone stimulator.  The patient denies any significant numbness or tingling of calf pain.  He is approximately 1 year postop.     Exam  side: right Lower Extremity :  Incisions are healed  There is no tenderness to palpation over the distal femur  There is a large knee effusion, no erythema or warmth  Neurovascular exam normal distally  2+ dorsalis pedis pulse and good cap refill     Radiographs  See dictated report     Assessment  Status post retrograde nailing right distal femur fracture  Nonunion right distal femur fracture     Plan  Continue bone stimulator  CT scan right femur to assess healing  Follow-up after CT scan to go over the results and formulate a plan  Weight bearing: WBAT (Weight Bearing as Tolerated)  All questions answered

## 2025-06-17 ENCOUNTER — HOSPITAL ENCOUNTER (OUTPATIENT)
Dept: RADIOLOGY | Facility: HOSPITAL | Age: 70
Discharge: HOME | End: 2025-06-17
Payer: COMMERCIAL

## 2025-06-17 ENCOUNTER — APPOINTMENT (OUTPATIENT)
Dept: ORTHOPEDIC SURGERY | Facility: CLINIC | Age: 70
End: 2025-06-17
Payer: COMMERCIAL

## 2025-06-17 DIAGNOSIS — Z98.890 S/P ORIF (OPEN REDUCTION INTERNAL FIXATION) FRACTURE: Primary | ICD-10-CM

## 2025-06-17 DIAGNOSIS — S72.301G: ICD-10-CM

## 2025-06-17 DIAGNOSIS — Z87.81 S/P ORIF (OPEN REDUCTION INTERNAL FIXATION) FRACTURE: ICD-10-CM

## 2025-06-17 DIAGNOSIS — Z87.81 S/P ORIF (OPEN REDUCTION INTERNAL FIXATION) FRACTURE: Primary | ICD-10-CM

## 2025-06-17 DIAGNOSIS — Z98.890 S/P ORIF (OPEN REDUCTION INTERNAL FIXATION) FRACTURE: ICD-10-CM

## 2025-06-17 PROCEDURE — 73552 X-RAY EXAM OF FEMUR 2/>: CPT | Mod: RT

## 2025-06-17 PROCEDURE — 99213 OFFICE O/P EST LOW 20 MIN: CPT | Performed by: ORTHOPAEDIC SURGERY

## 2025-06-17 PROCEDURE — 73552 X-RAY EXAM OF FEMUR 2/>: CPT | Mod: RIGHT SIDE | Performed by: RADIOLOGY

## 2025-06-20 SDOH — ECONOMIC STABILITY: FOOD INSECURITY: WITHIN THE PAST 12 MONTHS, THE FOOD YOU BOUGHT JUST DIDN'T LAST AND YOU DIDN'T HAVE MONEY TO GET MORE.: NEVER TRUE

## 2025-06-20 SDOH — ECONOMIC STABILITY: INCOME INSECURITY: IN THE LAST 12 MONTHS, WAS THERE A TIME WHEN YOU WERE NOT ABLE TO PAY THE MORTGAGE OR RENT ON TIME?: NO

## 2025-06-20 SDOH — ECONOMIC STABILITY: FOOD INSECURITY: WITHIN THE PAST 12 MONTHS, YOU WORRIED THAT YOUR FOOD WOULD RUN OUT BEFORE YOU GOT MONEY TO BUY MORE.: NEVER TRUE

## 2025-06-20 ASSESSMENT — PATIENT HEALTH QUESTIONNAIRE - PHQ9
SUM OF ALL RESPONSES TO PHQ QUESTIONS 1-9: 0
2. FEELING DOWN, DEPRESSED OR HOPELESS: NOT AT ALL
SUM OF ALL RESPONSES TO PHQ QUESTIONS 1-9: 0
1. LITTLE INTEREST OR PLEASURE IN DOING THINGS: NOT AT ALL
SUM OF ALL RESPONSES TO PHQ9 QUESTIONS 1 & 2: 0
SUM OF ALL RESPONSES TO PHQ QUESTIONS 1-9: 0
2. FEELING DOWN, DEPRESSED OR HOPELESS: NOT AT ALL
1. LITTLE INTEREST OR PLEASURE IN DOING THINGS: NOT AT ALL
SUM OF ALL RESPONSES TO PHQ QUESTIONS 1-9: 0

## 2025-06-23 ENCOUNTER — OFFICE VISIT (OUTPATIENT)
Age: 70
End: 2025-06-23
Payer: MEDICARE

## 2025-06-23 VITALS
HEIGHT: 72 IN | OXYGEN SATURATION: 92 % | SYSTOLIC BLOOD PRESSURE: 142 MMHG | DIASTOLIC BLOOD PRESSURE: 78 MMHG | HEART RATE: 77 BPM | BODY MASS INDEX: 41.26 KG/M2 | TEMPERATURE: 97.2 F | WEIGHT: 304.6 LBS

## 2025-06-23 VITALS
DIASTOLIC BLOOD PRESSURE: 78 MMHG | SYSTOLIC BLOOD PRESSURE: 142 MMHG | WEIGHT: 304 LBS | HEIGHT: 72 IN | RESPIRATION RATE: 18 BRPM | BODY MASS INDEX: 41.17 KG/M2

## 2025-06-23 DIAGNOSIS — I10 ESSENTIAL HYPERTENSION: Primary | ICD-10-CM

## 2025-06-23 DIAGNOSIS — Z00.00 MEDICARE ANNUAL WELLNESS VISIT, SUBSEQUENT: Primary | ICD-10-CM

## 2025-06-23 DIAGNOSIS — S72.401A CLOSED FRACTURE OF DISTAL END OF RIGHT FEMUR, UNSPECIFIED FRACTURE MORPHOLOGY, INITIAL ENCOUNTER (HCC): ICD-10-CM

## 2025-06-23 DIAGNOSIS — M17.11 PRIMARY OSTEOARTHRITIS OF RIGHT KNEE: ICD-10-CM

## 2025-06-23 PROCEDURE — G8417 CALC BMI ABV UP PARAM F/U: HCPCS | Performed by: FAMILY MEDICINE

## 2025-06-23 PROCEDURE — 1159F MED LIST DOCD IN RCRD: CPT | Performed by: NURSE PRACTITIONER

## 2025-06-23 PROCEDURE — 99213 OFFICE O/P EST LOW 20 MIN: CPT | Performed by: FAMILY MEDICINE

## 2025-06-23 PROCEDURE — 1036F TOBACCO NON-USER: CPT | Performed by: FAMILY MEDICINE

## 2025-06-23 PROCEDURE — 3077F SYST BP >= 140 MM HG: CPT | Performed by: NURSE PRACTITIONER

## 2025-06-23 PROCEDURE — 1123F ACP DISCUSS/DSCN MKR DOCD: CPT | Performed by: NURSE PRACTITIONER

## 2025-06-23 PROCEDURE — 3078F DIAST BP <80 MM HG: CPT | Performed by: NURSE PRACTITIONER

## 2025-06-23 PROCEDURE — G8427 DOCREV CUR MEDS BY ELIG CLIN: HCPCS | Performed by: FAMILY MEDICINE

## 2025-06-23 PROCEDURE — 3017F COLORECTAL CA SCREEN DOC REV: CPT | Performed by: NURSE PRACTITIONER

## 2025-06-23 PROCEDURE — 99214 OFFICE O/P EST MOD 30 MIN: CPT | Performed by: FAMILY MEDICINE

## 2025-06-23 PROCEDURE — G0439 PPPS, SUBSEQ VISIT: HCPCS | Performed by: NURSE PRACTITIONER

## 2025-06-23 RX ORDER — MELOXICAM 15 MG/1
15 TABLET ORAL DAILY
Qty: 90 TABLET | Refills: 3 | Status: SHIPPED | OUTPATIENT
Start: 2025-06-23

## 2025-06-23 RX ORDER — MELOXICAM 15 MG/1
TABLET ORAL
COMMUNITY
Start: 2024-05-04 | End: 2025-06-23 | Stop reason: SDUPTHER

## 2025-06-23 RX ORDER — HYDROCODONE BITARTRATE AND ACETAMINOPHEN 5; 325 MG/1; MG/1
1 TABLET ORAL EVERY 6 HOURS PRN
Qty: 28 TABLET | Refills: 0 | Status: SHIPPED | OUTPATIENT
Start: 2025-06-23 | End: 2025-06-30

## 2025-06-23 RX ORDER — OXYCODONE HYDROCHLORIDE 10 MG/1
10 TABLET ORAL EVERY 4 HOURS PRN
COMMUNITY
Start: 2024-07-01

## 2025-06-23 ASSESSMENT — PATIENT HEALTH QUESTIONNAIRE - PHQ9
2. FEELING DOWN, DEPRESSED OR HOPELESS: NOT AT ALL
1. LITTLE INTEREST OR PLEASURE IN DOING THINGS: NOT AT ALL
SUM OF ALL RESPONSES TO PHQ QUESTIONS 1-9: 0

## 2025-06-23 NOTE — PROGRESS NOTES
[facility]  [facility]  [address] [address]  Dept: [phone]  Dept Fax: [phone]  Loc: [phone]     Subjective  Kane Gibbs, 69 y.o. male Established patient presents today with:  Chief Complaint   Patient presents with    Hypertension    Diabetes     Last A1c on 4.14.25 was 6.6       History of Present Illness  The patient presents for evaluation of pain management, diabetes, and health maintenance.    He experienced a shoulder dislocation last Friday while lifting a box of water and stabilizing it with his arm. The dislocation occurred when he used his arm to open a door, but it spontaneously relocated en route to the emergency room. He is currently undergoing rehabilitation and has an appointment with an orthopedic specialist today.    He has been dealing with a nonunion fracture in his leg for the past year, which is scheduled for a CT scan this weekend. Despite the presence of calcium around the fracture, it remains unhealed. He reports no pain from the fracture site but experiences discomfort in his knee, which has previously been replaced. He has been informed that he has arthritis in his knee, but further intervention is not possible until the fracture heals. He is considering intermediate due to difficulty walking from his car to the parking lot.    He is seeking advice on whether to continue taking Mobic and is requesting steroids for his back pain. He has discontinued Mobic and is seeking advice on whether to resume it. He has been prescribed hydrocodone by Dr. Weiss, which he takes as needed for morning stiffness or soreness. He has been advised to remove the luz maria and repair the fracture, but he prefers to wait a few more months. He has been taking Tylenol for pain management.    He is under the care of Dr. RENEE for diabetes management. He recalls one instance of elevated urine

## 2025-06-23 NOTE — PROGRESS NOTES
Medicare Annual Wellness Visit    Kane Gibbs is here for Medicare AWV    Assessment & Plan   Medicare annual wellness visit, subsequent       No follow-ups on file.     Subjective       Patient's complete Health Risk Assessment and screening values have been reviewed and are found in Flowsheets. The following problems were reviewed today and where indicated follow up appointments were made and/or referrals ordered.    Positive Risk Factor Screenings with Interventions:          Controlled Medication Review:    Today's Pain Level: No data recorded   Opioid Risk: (Low risk score <55) Opioid risk score: 3    Patient is low risk for opioid use disorder or overdose.    Last PDMP Alin as Reviewed:  Review User Review Instant Review Result   EJ APODACA 6/23/2025 10:17 AM     Reviewed PDMP [1]     Last Controlled Substance Monitoring Documentation      Flowsheet Row ED to Hosp-Admission (Discharged) from 6/21/2024 in [facility]   Periodic Controlled Substance Monitoring Possible medication side effects, risk of tolerance/dependence & alternative treatments discussed., No signs of potential drug abuse or diversion identified., Assessed functional status (ability to engage in work or other purposeful activities, the pain intensity and its interference with activities of daily living, quality of family life and social activities, and the physical activity), Obtaining appropriate analgesic effect of treatment. filed at 06/22/2024 1103   Acute Pain Prescriptions Prescription exceeds daily limit for a specific reason. See comments or note., Severe pain not adequately treated with lower dose., Not required given exclusionary diagnoses... filed at 06/22/2024 1103   All MEDD Reviewed of previous treatment and response to treatment, patients adherence to medication and non-medication treatment, Treatment Plan documented (Diagnosis, Goals, Rationale for the medication choice & dosage and Planned duration of treatment and

## 2025-06-27 LAB

## 2025-06-28 ENCOUNTER — APPOINTMENT (OUTPATIENT)
Dept: RADIOLOGY | Facility: CLINIC | Age: 70
End: 2025-06-28
Payer: COMMERCIAL

## 2025-06-28 DIAGNOSIS — Z98.890 S/P ORIF (OPEN REDUCTION INTERNAL FIXATION) FRACTURE: ICD-10-CM

## 2025-06-28 DIAGNOSIS — S72.301G: ICD-10-CM

## 2025-06-28 DIAGNOSIS — Z87.81 S/P ORIF (OPEN REDUCTION INTERNAL FIXATION) FRACTURE: ICD-10-CM

## 2025-06-28 PROCEDURE — 73700 CT LOWER EXTREMITY W/O DYE: CPT | Mod: RT

## 2025-07-14 DIAGNOSIS — E11.65 TYPE 2 DIABETES MELLITUS WITH HYPERGLYCEMIA, WITHOUT LONG-TERM CURRENT USE OF INSULIN (HCC): ICD-10-CM

## 2025-07-14 LAB
ALBUMIN SERPL-MCNC: 4.6 G/DL (ref 3.5–4.6)
ALP SERPL-CCNC: 93 U/L (ref 35–104)
ALT SERPL-CCNC: 36 U/L (ref 0–41)
ANION GAP SERPL CALCULATED.3IONS-SCNC: 13 MEQ/L (ref 9–15)
AST SERPL-CCNC: 26 U/L (ref 0–40)
BILIRUB SERPL-MCNC: 1.3 MG/DL (ref 0.2–0.7)
BUN SERPL-MCNC: 15 MG/DL (ref 8–23)
CALCIUM SERPL-MCNC: 9.7 MG/DL (ref 8.5–9.9)
CHLORIDE SERPL-SCNC: 103 MEQ/L (ref 95–107)
CHOLEST SERPL-MCNC: 134 MG/DL (ref 0–199)
CO2 SERPL-SCNC: 25 MEQ/L (ref 20–31)
CREAT SERPL-MCNC: 0.9 MG/DL (ref 0.7–1.2)
CREAT UR-MCNC: 114.5 MG/DL
ESTIMATED AVERAGE GLUCOSE: 126 MG/DL
GLOBULIN SER CALC-MCNC: 2.7 G/DL (ref 2.3–3.5)
GLUCOSE SERPL-MCNC: 121 MG/DL (ref 70–99)
HBA1C MFR BLD: 6 % (ref 4–6)
HDLC SERPL-MCNC: 37 MG/DL (ref 40–59)
LDLC SERPL CALC-MCNC: 75 MG/DL (ref 0–129)
MICROALBUMIN UR-MCNC: <1.2 MG/DL
MICROALBUMIN/CREAT UR-RTO: NORMAL MG/G (ref 0–30)
POTASSIUM SERPL-SCNC: 4.1 MEQ/L (ref 3.4–4.9)
PROT SERPL-MCNC: 7.3 G/DL (ref 6.3–8)
SODIUM SERPL-SCNC: 141 MEQ/L (ref 135–144)
TRIGL SERPL-MCNC: 108 MG/DL (ref 0–150)

## 2025-07-23 ENCOUNTER — OFFICE VISIT (OUTPATIENT)
Age: 70
End: 2025-07-23
Payer: MEDICARE

## 2025-07-23 VITALS
HEIGHT: 72 IN | HEART RATE: 63 BPM | SYSTOLIC BLOOD PRESSURE: 145 MMHG | OXYGEN SATURATION: 97 % | WEIGHT: 307 LBS | DIASTOLIC BLOOD PRESSURE: 79 MMHG | BODY MASS INDEX: 41.58 KG/M2

## 2025-07-23 DIAGNOSIS — E11.65 TYPE 2 DIABETES MELLITUS WITH HYPERGLYCEMIA, WITHOUT LONG-TERM CURRENT USE OF INSULIN (HCC): Primary | ICD-10-CM

## 2025-07-23 LAB
CHP ED QC CHECK: NORMAL
GLUCOSE BLD-MCNC: 106 MG/DL

## 2025-07-23 PROCEDURE — 3077F SYST BP >= 140 MM HG: CPT | Performed by: PHYSICIAN ASSISTANT

## 2025-07-23 PROCEDURE — 1036F TOBACCO NON-USER: CPT | Performed by: PHYSICIAN ASSISTANT

## 2025-07-23 PROCEDURE — 3017F COLORECTAL CA SCREEN DOC REV: CPT | Performed by: PHYSICIAN ASSISTANT

## 2025-07-23 PROCEDURE — 1160F RVW MEDS BY RX/DR IN RCRD: CPT | Performed by: PHYSICIAN ASSISTANT

## 2025-07-23 PROCEDURE — 2022F DILAT RTA XM EVC RTNOPTHY: CPT | Performed by: PHYSICIAN ASSISTANT

## 2025-07-23 PROCEDURE — PBSHW POCT GLUCOSE: Performed by: PHYSICIAN ASSISTANT

## 2025-07-23 PROCEDURE — 1125F AMNT PAIN NOTED PAIN PRSNT: CPT | Performed by: PHYSICIAN ASSISTANT

## 2025-07-23 PROCEDURE — 1123F ACP DISCUSS/DSCN MKR DOCD: CPT | Performed by: PHYSICIAN ASSISTANT

## 2025-07-23 PROCEDURE — 82962 GLUCOSE BLOOD TEST: CPT | Performed by: PHYSICIAN ASSISTANT

## 2025-07-23 PROCEDURE — G8417 CALC BMI ABV UP PARAM F/U: HCPCS | Performed by: PHYSICIAN ASSISTANT

## 2025-07-23 PROCEDURE — 3078F DIAST BP <80 MM HG: CPT | Performed by: PHYSICIAN ASSISTANT

## 2025-07-23 PROCEDURE — 3044F HG A1C LEVEL LT 7.0%: CPT | Performed by: PHYSICIAN ASSISTANT

## 2025-07-23 PROCEDURE — 99213 OFFICE O/P EST LOW 20 MIN: CPT | Performed by: PHYSICIAN ASSISTANT

## 2025-07-23 PROCEDURE — 99214 OFFICE O/P EST MOD 30 MIN: CPT | Performed by: PHYSICIAN ASSISTANT

## 2025-07-23 PROCEDURE — G8427 DOCREV CUR MEDS BY ELIG CLIN: HCPCS | Performed by: PHYSICIAN ASSISTANT

## 2025-07-23 PROCEDURE — 1159F MED LIST DOCD IN RCRD: CPT | Performed by: PHYSICIAN ASSISTANT

## 2025-07-23 ASSESSMENT — ENCOUNTER SYMPTOMS
DIARRHEA: 0
SINUS PRESSURE: 0
ABDOMINAL PAIN: 0
VOMITING: 0
WHEEZING: 0
RHINORRHEA: 0
SORE THROAT: 0
SHORTNESS OF BREATH: 0
COUGH: 0
NAUSEA: 0

## 2025-07-23 NOTE — PROGRESS NOTES
7/23/2025    Assessment:       Diagnosis Orders   1. Type 2 diabetes mellitus with hyperglycemia, without long-term current use of insulin ([facility])  POCT Glucose        PLAN:     Assessment & Plan    1. Type 2 Diabetes Mellitus: Well-controlled.  - Hemoglobin A1c is 6%.  - Discussed the high cost of Trulicity and alternatives for glucose monitoring. Advised to check with insurance for cheaper options and to purchase glucose meter and strips from [employer] or [employer].  - Continue metformin 500 mg twice a day. Advised to monitor blood glucose levels regularly and return for a follow-up visit sooner if levels increase.    2. Hyperlipidemia.  - HDL is 37. Cholesterol is 134, triglycerides are 108.  - Discussed the importance of maintaining a healthy diet and regular exercise to improve HDL levels.    3. Nonunion Healing of Right Femur Fracture.  - Recent CAT scan indicated nonunion healing.  - Advised to follow up with orthopedic specialist for further evaluation and management.    Follow-up  - A follow-up visit is scheduled in 6 months.     Orders Placed This Encounter   Procedures    POCT Glucose     No orders of the defined types were placed in this encounter.    No follow-ups on file.  Subjective:     Chief Complaint   Patient presents with    Diabetes    Follow-up     Vitals:    07/23/25 0840   BP: (!) 145/79   Pulse: 63   SpO2: 97%   Weight: (!) 139.3 kg (307 lb)   Height: 1.829 m (6')     Wt Readings from Last 3 Encounters:   07/23/25 (!) 139.3 kg (307 lb)   06/23/25 (!) 137.9 kg (304 lb)   06/23/25 (!) 138.2 kg (304 lb 9.6 oz)     BP Readings from Last 3 Encounters:   07/23/25 (!) 145/79   06/23/25 (!) 142/78   06/23/25 (!) 142/78     Diabetes  He presents for his initial diabetic visit. He has type 2 diabetes mellitus. The initial diagnosis of diabetes was made 3 months ago. His disease course has been improving. There are no hypoglycemic associated symptoms. Pertinent negatives for hypoglycemia include no dizziness

## 2025-08-12 DIAGNOSIS — I10 ESSENTIAL HYPERTENSION: ICD-10-CM

## 2025-08-13 RX ORDER — METOPROLOL TARTRATE 100 MG/1
100 TABLET ORAL 2 TIMES DAILY
Qty: 180 TABLET | Refills: 3 | Status: SHIPPED | OUTPATIENT
Start: 2025-08-13

## (undated) DEVICE — GLOVE ORANGE PI 7 1/2   MSG9075

## (undated) DEVICE — NEPTUNE E-SEP SMOKE EVACUATION PENCIL, COATED, 70MM BLADE, PUSH BUTTON SWITCH: Brand: NEPTUNE E-SEP

## (undated) DEVICE — BANDAGE COMPR M W6INXL10YD WHT BGE VELC E MTRX HK AND LOOP

## (undated) DEVICE — DRESSING HYDROFIBER AQUACEL AG ADVANTAGE 3.5X6 IN

## (undated) DEVICE — SINGLE PORT MANIFOLD: Brand: NEPTUNE 2

## (undated) DEVICE — GLOVE ORANGE PI 8 1/2   MSG9085

## (undated) DEVICE — GLOVE ORANGE PI 8   MSG9080

## (undated) DEVICE — BIT DRILL CALIBRATED 4.2 EX-LONG

## (undated) DEVICE — PAD,ABDOMINAL,8"X10",ST,LF: Brand: MEDLINE

## (undated) DEVICE — 3M™ STERI-DRAPE™ INSTRUMENT POUCH 1018: Brand: STERI-DRAPE™

## (undated) DEVICE — GOWN,SIRUS,NONRNF,SETINSLV,2XL,18/CS: Brand: MEDLINE

## (undated) DEVICE — GUIDEWIRE ORTH L400MM DIA3.2MM FOR TFN

## (undated) DEVICE — BLADE,CARBON-STEEL,10,STRL,DISPOSABLE,TB: Brand: MEDLINE

## (undated) DEVICE — SUTURE VICRYL SZ 2-0 L36IN ABSRB UD L36MM CT-1 1/2 CIR J945H

## (undated) DEVICE — YANKAUER,SMOOTH HANDLE,HIGH CAPACITY: Brand: MEDLINE INDUSTRIES, INC.

## (undated) DEVICE — SUTURE VICRYL SZ 1 L36IN ABSRB UD L36MM CT-1 1/2 CIR J947H

## (undated) DEVICE — 3M™ IOBAN™ 2 ANTIMICROBIAL INCISE DRAPE 6650EZ: Brand: IOBAN™ 2

## (undated) DEVICE — ROD RMR L950MM DIA2.5MM W/ EXTN BALL TIP

## (undated) DEVICE — 4.2MM RADIOLUCENT DRILL BIT

## (undated) DEVICE — SHEET, DRAPE, SPLIT, STERILE: Brand: MEDLINE

## (undated) DEVICE — KIT ARMOR C DRP COLLAPSIBLE AND SELF EXP TOP CVR FOR FLUOROSCOPIC

## (undated) DEVICE — LOWER EXTREMITY: Brand: MEDLINE INDUSTRIES, INC.

## (undated) DEVICE — SPONGE,LAP,18"X18",DLX,XR,ST,5/PK,40/PK: Brand: MEDLINE

## (undated) DEVICE — SHEET,DRAPE,53X77,STERILE: Brand: MEDLINE

## (undated) DEVICE — BANDAGE COBAN 4 IN COMPR W4INXL5YD FOAM COHESIVE QUIK STK SELF ADH SFT

## (undated) DEVICE — STAPLER SKIN H3.9MM WIRE DIA0.58MM CRWN 6.9MM 35 STPL FIX

## (undated) DEVICE — STERILE SLEEVE: Brand: CONVERTORS

## (undated) DEVICE — PADDING CAST W6INXL4YD RAYON UNDERCAST SOF-ROL

## (undated) DEVICE — TOWEL,OR,DSP,ST,BLUE,STD,4/PK,20PK/CS: Brand: MEDLINE

## (undated) DEVICE — PADDING CAST W4INXL4YD SPUN DACRON POLY POR NON STERILE